# Patient Record
Sex: MALE | Race: BLACK OR AFRICAN AMERICAN | NOT HISPANIC OR LATINO | Employment: UNEMPLOYED | URBAN - METROPOLITAN AREA
[De-identification: names, ages, dates, MRNs, and addresses within clinical notes are randomized per-mention and may not be internally consistent; named-entity substitution may affect disease eponyms.]

---

## 2017-06-02 ENCOUNTER — HISTORICAL (OUTPATIENT)
Dept: ADMINISTRATIVE | Facility: HOSPITAL | Age: 37
End: 2017-06-02

## 2017-06-02 LAB
ABS NEUT (OLG): 2.49 X10(3)/MCL (ref 2.1–9.2)
ABS NEUT (OLG): 2.62 X10(3)/MCL (ref 2.1–9.2)
ALBUMIN SERPL-MCNC: 4.5 GM/DL (ref 3.4–5)
ALBUMIN/GLOB SERPL: 1 RATIO (ref 1–2)
ALP SERPL-CCNC: 49 UNIT/L (ref 20–120)
ALT SERPL-CCNC: 26 UNIT/L
AST SERPL-CCNC: 22 UNIT/L
BASOPHILS # BLD AUTO: 0.04 X10(3)/MCL
BASOPHILS # BLD AUTO: 0.04 X10(3)/MCL
BASOPHILS NFR BLD AUTO: 1 % (ref 0–1)
BASOPHILS NFR BLD AUTO: 1 % (ref 0–1)
BILIRUB SERPL-MCNC: 0.8 MG/DL
BILIRUBIN DIRECT+TOT PNL SERPL-MCNC: 0.2 MG/DL
BILIRUBIN DIRECT+TOT PNL SERPL-MCNC: 0.6 MG/DL
BUN SERPL-MCNC: 17 MG/DL (ref 7–25)
CALCIUM SERPL-MCNC: 9.3 MG/DL (ref 8.4–10.3)
CD3+CD4+ CELLS # SPEC: 1180 UNIT/L (ref 589–1505)
CD3+CD4+ CELLS NFR BLD: 43.7 % (ref 31–59)
CHLORIDE SERPL-SCNC: 103 MMOL/L (ref 96–110)
CO2 SERPL-SCNC: 31 MMOL/L (ref 24–32)
CREAT SERPL-MCNC: 1.01 MG/DL (ref 0.7–1.4)
EOSINOPHIL # BLD AUTO: 0.14 X10(3)/MCL
EOSINOPHIL # BLD AUTO: 0.23 10*3/UL
EOSINOPHIL NFR BLD AUTO: 2 % (ref 0–5)
EOSINOPHIL NFR BLD AUTO: 4 % (ref 0–5)
ERYTHROCYTE [DISTWIDTH] IN BLOOD BY AUTOMATED COUNT: 13.6 % (ref 11.5–14.5)
ERYTHROCYTE [DISTWIDTH] IN BLOOD BY AUTOMATED COUNT: 13.7 % (ref 11.5–14.5)
GLOBULIN SER-MCNC: 3.3 GM/ML (ref 2.3–3.5)
GLUCOSE SERPL-MCNC: 90 MG/DL (ref 65–99)
HCT VFR BLD AUTO: 47.3 % (ref 40–51)
HCT VFR BLD AUTO: 47.4 % (ref 40–51)
HGB BLD-MCNC: 16 GM/DL (ref 13.5–17.5)
HGB BLD-MCNC: 16.1 GM/DL (ref 13.5–17.5)
IMM GRANULOCYTES # BLD AUTO: 0.01 10*3/UL
IMM GRANULOCYTES # BLD AUTO: 0.01 10*3/UL
IMM GRANULOCYTES NFR BLD AUTO: 0 %
IMM GRANULOCYTES NFR BLD AUTO: 0 %
LYMPHOCYTES # BLD AUTO: 2.59 X10(3)/MCL
LYMPHOCYTES # BLD AUTO: 2.71 X10(3)/MCL
LYMPHOCYTES # BLD AUTO: 2700 UNIT/L (ref 1260–5520)
LYMPHOCYTES NFR BLD AUTO: 44 % (ref 15–40)
LYMPHOCYTES NFR BLD AUTO: 45 % (ref 15–40)
LYMPHOCYTES NFR LN MANUAL: 45 % (ref 28–48)
LYMPHOMA - T-CELL MARKERS SPEC-IMP: NORMAL
MCH RBC QN AUTO: 29.5 PG (ref 26–34)
MCH RBC QN AUTO: 29.5 PG (ref 26–34)
MCHC RBC AUTO-ENTMCNC: 33.8 GM/DL (ref 31–37)
MCHC RBC AUTO-ENTMCNC: 34 GM/DL (ref 31–37)
MCV RBC AUTO: 86.6 FL (ref 80–100)
MCV RBC AUTO: 87.3 FL (ref 80–100)
MONOCYTES # BLD AUTO: 0.54 X10(3)/MCL
MONOCYTES # BLD AUTO: 0.55 X10(3)/MCL
MONOCYTES NFR BLD AUTO: 9 % (ref 4–12)
MONOCYTES NFR BLD AUTO: 9 % (ref 4–12)
NEUTROPHILS # BLD AUTO: 2.49 X10(3)/MCL
NEUTROPHILS # BLD AUTO: 2.62 X10(3)/MCL
NEUTROPHILS NFR BLD AUTO: 41 X10(3)/MCL
NEUTROPHILS NFR BLD AUTO: 44 X10(3)/MCL
PLATELET # BLD AUTO: 179 X10(3)/MCL (ref 130–400)
PLATELET # BLD AUTO: 179 X10(3)/MCL (ref 130–400)
PMV BLD AUTO: 11.8 FL (ref 7.4–10.4)
PMV BLD AUTO: 12.2 FL (ref 7.4–10.4)
POTASSIUM SERPL-SCNC: 4.4 MMOL/L (ref 3.6–5.2)
PROT SERPL-MCNC: 7.8 GM/DL (ref 6–8)
RBC # BLD AUTO: 5.43 X10(6)/MCL (ref 4.5–5.9)
RBC # BLD AUTO: 5.46 X10(6)/MCL (ref 4.5–5.9)
SODIUM SERPL-SCNC: 138 MMOL/L (ref 135–146)
WBC # BLD AUTO: 6000 /MM3 (ref 4500–11500)
WBC # SPEC AUTO: 5.9 X10(3)/MCL (ref 4.5–11)
WBC # SPEC AUTO: 6 X10(3)/MCL (ref 4.5–11)

## 2017-09-22 ENCOUNTER — HISTORICAL (OUTPATIENT)
Dept: ADMINISTRATIVE | Facility: HOSPITAL | Age: 37
End: 2017-09-22

## 2017-09-22 LAB
ABS NEUT (OLG): 1.63 X10(3)/MCL (ref 2.1–9.2)
ABS NEUT (OLG): 1.76 X10(3)/MCL (ref 2.1–9.2)
ALBUMIN SERPL-MCNC: 4.2 GM/DL (ref 3.4–5)
ALBUMIN/GLOB SERPL: 1 RATIO (ref 1–2)
ALP SERPL-CCNC: 57 UNIT/L (ref 45–117)
ALT SERPL-CCNC: 30 UNIT/L (ref 12–78)
AST SERPL-CCNC: 19 UNIT/L (ref 15–37)
BASOPHILS # BLD AUTO: 0.05 X10(3)/MCL
BASOPHILS # BLD AUTO: 0.05 X10(3)/MCL
BASOPHILS NFR BLD AUTO: 1 % (ref 0–1)
BASOPHILS NFR BLD AUTO: 1 % (ref 0–1)
BILIRUB SERPL-MCNC: 0.5 MG/DL (ref 0.2–1)
BILIRUBIN DIRECT+TOT PNL SERPL-MCNC: 0.2 MG/DL
BILIRUBIN DIRECT+TOT PNL SERPL-MCNC: 0.3 MG/DL
BUN SERPL-MCNC: 13 MG/DL (ref 7–18)
CALCIUM SERPL-MCNC: 9.1 MG/DL (ref 8.5–10.1)
CD3+CD4+ CELLS # SPEC: 1123 UNIT/L (ref 589–1505)
CD3+CD4+ CELLS NFR BLD: 45.1 % (ref 31–59)
CHLORIDE SERPL-SCNC: 107 MMOL/L (ref 98–107)
CO2 SERPL-SCNC: 29 MMOL/L (ref 21–32)
CREAT SERPL-MCNC: 1.1 MG/DL (ref 0.6–1.3)
EOSINOPHIL # BLD AUTO: 0.13 10*3/UL
EOSINOPHIL # BLD AUTO: 0.13 X10(3)/MCL
EOSINOPHIL NFR BLD AUTO: 3 % (ref 0–5)
EOSINOPHIL NFR BLD AUTO: 3 % (ref 0–5)
ERYTHROCYTE [DISTWIDTH] IN BLOOD BY AUTOMATED COUNT: 13.2 % (ref 11.5–14.5)
ERYTHROCYTE [DISTWIDTH] IN BLOOD BY AUTOMATED COUNT: 13.2 % (ref 11.5–14.5)
GLOBULIN SER-MCNC: 3.8 GM/ML (ref 2.3–3.5)
GLUCOSE SERPL-MCNC: 91 MG/DL (ref 74–106)
HCT VFR BLD AUTO: 46.1 % (ref 40–51)
HCT VFR BLD AUTO: 48.3 % (ref 40–51)
HGB BLD-MCNC: 16.3 GM/DL (ref 13.5–17.5)
HGB BLD-MCNC: 16.7 GM/DL (ref 13.5–17.5)
IMM GRANULOCYTES # BLD AUTO: 0.01 10*3/UL
IMM GRANULOCYTES NFR BLD AUTO: 0 %
LYMPHOCYTES # BLD AUTO: 2.5 X10(3)/MCL
LYMPHOCYTES # BLD AUTO: 2.67 X10(3)/MCL
LYMPHOCYTES # BLD AUTO: 2491 UNIT/L (ref 1260–5520)
LYMPHOCYTES NFR BLD AUTO: 53 % (ref 15–40)
LYMPHOCYTES NFR BLD AUTO: 53 % (ref 15–40)
LYMPHOCYTES NFR LN MANUAL: 53 % (ref 28–48)
LYMPHOMA - T-CELL MARKERS SPEC-IMP: ABNORMAL
MCH RBC QN AUTO: 30.1 PG (ref 26–34)
MCH RBC QN AUTO: 30.6 PG (ref 26–34)
MCHC RBC AUTO-ENTMCNC: 34.6 GM/DL (ref 31–37)
MCHC RBC AUTO-ENTMCNC: 35.4 GM/DL (ref 31–37)
MCV RBC AUTO: 86.5 FL (ref 80–100)
MCV RBC AUTO: 87 FL (ref 80–100)
MONOCYTES # BLD AUTO: 0.36 X10(3)/MCL
MONOCYTES # BLD AUTO: 0.43 X10(3)/MCL
MONOCYTES NFR BLD AUTO: 8 % (ref 4–12)
MONOCYTES NFR BLD AUTO: 8 % (ref 4–12)
NEUTROPHILS # BLD AUTO: 1.63 X10(3)/MCL
NEUTROPHILS # BLD AUTO: 1.76 X10(3)/MCL
NEUTROPHILS NFR BLD AUTO: 35 X10(3)/MCL
NEUTROPHILS NFR BLD AUTO: 35 X10(3)/MCL
PLATELET # BLD AUTO: 175 X10(3)/MCL (ref 130–400)
PLATELET # BLD AUTO: 177 X10(3)/MCL (ref 130–400)
PMV BLD AUTO: 12.2 FL (ref 7.4–10.4)
PMV BLD AUTO: 12.2 FL (ref 7.4–10.4)
POTASSIUM SERPL-SCNC: 4.2 MMOL/L (ref 3.5–5.1)
PROT SERPL-MCNC: 8 GM/DL (ref 6.4–8.2)
RBC # BLD AUTO: 5.33 X10(6)/MCL (ref 4.5–5.9)
RBC # BLD AUTO: 5.55 X10(6)/MCL (ref 4.5–5.9)
SODIUM SERPL-SCNC: 139 MMOL/L (ref 136–145)
WBC # BLD AUTO: 4700 /MM3 (ref 4500–11500)
WBC # SPEC AUTO: 4.7 X10(3)/MCL (ref 4.5–11)
WBC # SPEC AUTO: 5 X10(3)/MCL (ref 4.5–11)

## 2017-12-29 ENCOUNTER — HISTORICAL (OUTPATIENT)
Dept: ADMINISTRATIVE | Facility: HOSPITAL | Age: 37
End: 2017-12-29

## 2017-12-29 LAB
ABS NEUT (OLG): 1.76 X10(3)/MCL (ref 2.1–9.2)
ABS NEUT (OLG): 1.86 X10(3)/MCL (ref 2.1–9.2)
ALBUMIN SERPL-MCNC: 3.9 GM/DL (ref 3.4–5)
ALBUMIN/GLOB SERPL: 1 RATIO (ref 1–2)
ALP SERPL-CCNC: 71 UNIT/L (ref 45–117)
ALT SERPL-CCNC: 34 UNIT/L (ref 12–78)
APPEARANCE, UA: CLEAR
AST SERPL-CCNC: 39 UNIT/L (ref 15–37)
BACTERIA #/AREA URNS AUTO: ABNORMAL /[HPF]
BASOPHILS # BLD AUTO: 0.04 X10(3)/MCL
BASOPHILS # BLD AUTO: 0.05 X10(3)/MCL
BASOPHILS NFR BLD AUTO: 1 % (ref 0–1)
BASOPHILS NFR BLD AUTO: 1 % (ref 0–1)
BILIRUB SERPL-MCNC: 0.4 MG/DL (ref 0.2–1)
BILIRUB UR QL STRIP: NEGATIVE
BILIRUBIN DIRECT+TOT PNL SERPL-MCNC: 0.1 MG/DL
BILIRUBIN DIRECT+TOT PNL SERPL-MCNC: 0.3 MG/DL
BUN SERPL-MCNC: 15 MG/DL (ref 7–18)
CALCIUM SERPL-MCNC: 8.7 MG/DL (ref 8.5–10.1)
CD3+CD4+ CELLS # SPEC: 967 UNIT/L (ref 589–1505)
CD3+CD4+ CELLS NFR BLD: 48.9 % (ref 31–59)
CHLORIDE SERPL-SCNC: 109 MMOL/L (ref 98–107)
CHOLEST SERPL-MCNC: 151 MG/DL
CHOLEST/HDLC SERPL: 4.7 {RATIO} (ref 0–5)
CO2 SERPL-SCNC: 29 MMOL/L (ref 21–32)
COLOR UR: YELLOW
CREAT SERPL-MCNC: 1.2 MG/DL (ref 0.6–1.3)
DEPRECATED CALCIDIOL+CALCIFEROL SERPL-MC: 47.01 NG/ML (ref 30–80)
EOSINOPHIL # BLD AUTO: 0.16 X10(3)/MCL
EOSINOPHIL # BLD AUTO: 0.22 10*3/UL
EOSINOPHIL NFR BLD AUTO: 4 % (ref 0–5)
EOSINOPHIL NFR BLD AUTO: 5 % (ref 0–5)
ERYTHROCYTE [DISTWIDTH] IN BLOOD BY AUTOMATED COUNT: 13.2 % (ref 11.5–14.5)
ERYTHROCYTE [DISTWIDTH] IN BLOOD BY AUTOMATED COUNT: 13.4 % (ref 11.5–14.5)
EST. AVERAGE GLUCOSE BLD GHB EST-MCNC: 123 MG/DL
GLOBULIN SER-MCNC: 3.7 GM/ML (ref 2.3–3.5)
GLUCOSE (UA): NORMAL
GLUCOSE SERPL-MCNC: 94 MG/DL (ref 74–106)
HBA1C MFR BLD: 5.9 % (ref 4.2–6.3)
HCT VFR BLD AUTO: 43.8 % (ref 40–51)
HCT VFR BLD AUTO: 44 % (ref 40–51)
HCV AB SERPL QL IA: NONREACTIVE
HDLC SERPL-MCNC: 32 MG/DL
HGB BLD-MCNC: 15.2 GM/DL (ref 13.5–17.5)
HGB BLD-MCNC: 15.3 GM/DL (ref 13.5–17.5)
HGB UR QL STRIP: NEGATIVE
HYALINE CASTS #/AREA URNS LPF: ABNORMAL /[LPF]
KETONES UR QL STRIP: ABNORMAL
LDLC SERPL CALC-MCNC: 84 MG/DL (ref 0–130)
LEUKOCYTE ESTERASE UR QL STRIP: 75 LEU/UL
LYMPHOCYTES # BLD AUTO: 1978 UNIT/L (ref 1260–5520)
LYMPHOCYTES # BLD AUTO: 2 X10(3)/MCL
LYMPHOCYTES # BLD AUTO: 2.07 X10(3)/MCL
LYMPHOCYTES NFR BLD AUTO: 46 % (ref 15–40)
LYMPHOCYTES NFR BLD AUTO: 46 % (ref 15–40)
LYMPHOCYTES NFR LN MANUAL: 46 % (ref 28–48)
LYMPHOMA - T-CELL MARKERS SPEC-IMP: ABNORMAL
MCH RBC QN AUTO: 30.3 PG (ref 26–34)
MCH RBC QN AUTO: 30.7 PG (ref 26–34)
MCHC RBC AUTO-ENTMCNC: 34.5 GM/DL (ref 31–37)
MCHC RBC AUTO-ENTMCNC: 34.9 GM/DL (ref 31–37)
MCV RBC AUTO: 87.8 FL (ref 80–100)
MCV RBC AUTO: 87.8 FL (ref 80–100)
MONOCYTES # BLD AUTO: 0.32 X10(3)/MCL
MONOCYTES # BLD AUTO: 0.34 X10(3)/MCL
MONOCYTES NFR BLD AUTO: 7 % (ref 4–12)
MONOCYTES NFR BLD AUTO: 8 % (ref 4–12)
NEG CONT SPOT COUNT: 0
NEUTROPHILS # BLD AUTO: 1.76 X10(3)/MCL
NEUTROPHILS # BLD AUTO: 1.86 X10(3)/MCL
NEUTROPHILS NFR BLD AUTO: 41 X10(3)/MCL
NEUTROPHILS NFR BLD AUTO: 41 X10(3)/MCL
NITRITE UR QL STRIP: NEGATIVE
PANEL A SPOT COUNT: 0
PANEL B SPOT COUNT: 0
PH UR STRIP: 6.5 [PH] (ref 4.5–8)
PLATELET # BLD AUTO: 167 X10(3)/MCL (ref 130–400)
PLATELET # BLD AUTO: 172 X10(3)/MCL (ref 130–400)
PMV BLD AUTO: 12.6 FL (ref 7.4–10.4)
PMV BLD AUTO: 12.6 FL (ref 7.4–10.4)
POS CONT SPOT COUNT: >20
POTASSIUM SERPL-SCNC: 4 MMOL/L (ref 3.5–5.1)
PROT SERPL-MCNC: 7.6 GM/DL (ref 6.4–8.2)
PROT UR QL STRIP: 20 MG/DL
RBC # BLD AUTO: 4.99 X10(6)/MCL (ref 4.5–5.9)
RBC # BLD AUTO: 5.01 X10(6)/MCL (ref 4.5–5.9)
RBC #/AREA URNS AUTO: ABNORMAL /[HPF]
SODIUM SERPL-SCNC: 144 MMOL/L (ref 136–145)
SP GR UR STRIP: 1.03 (ref 1–1.03)
SQUAMOUS #/AREA URNS LPF: ABNORMAL /[LPF]
T PALLIDUM AB SER QL: NONREACTIVE
T-SPOT.TB: NEGATIVE
TRIGL SERPL-MCNC: 174 MG/DL
TSH SERPL-ACNC: 0.44 MIU/L (ref 0.36–3.74)
UROBILINOGEN UR STRIP-ACNC: 6 MG/DL
VLDLC SERPL CALC-MCNC: 35 MG/DL
WBC # BLD AUTO: 4300 /MM3 (ref 4500–11500)
WBC # SPEC AUTO: 4.3 X10(3)/MCL (ref 4.5–11)
WBC # SPEC AUTO: 4.5 X10(3)/MCL (ref 4.5–11)
WBC #/AREA URNS AUTO: ABNORMAL /HPF

## 2018-07-31 ENCOUNTER — HISTORICAL (OUTPATIENT)
Dept: ADMINISTRATIVE | Facility: HOSPITAL | Age: 38
End: 2018-07-31

## 2018-07-31 LAB
ABS NEUT (OLG): 2.47 X10(3)/MCL (ref 2.1–9.2)
ALBUMIN SERPL-MCNC: 4.2 GM/DL (ref 3.4–5)
ALBUMIN/GLOB SERPL: 1 RATIO (ref 1–2)
ALP SERPL-CCNC: 58 UNIT/L (ref 45–117)
ALT SERPL-CCNC: 28 UNIT/L (ref 12–78)
AST SERPL-CCNC: 20 UNIT/L (ref 15–37)
BASOPHILS # BLD AUTO: 0.04 X10(3)/MCL
BASOPHILS NFR BLD AUTO: 1 %
BILIRUB SERPL-MCNC: 0.3 MG/DL (ref 0.2–1)
BILIRUBIN DIRECT+TOT PNL SERPL-MCNC: 0.1 MG/DL
BILIRUBIN DIRECT+TOT PNL SERPL-MCNC: 0.2 MG/DL
BUN SERPL-MCNC: 19 MG/DL (ref 7–18)
CALCIUM SERPL-MCNC: 8.8 MG/DL (ref 8.5–10.1)
CD3+CD4+ CELLS # SPEC: 998 UNIT/L (ref 589–1505)
CD3+CD4+ CELLS NFR BLD: 45.7 % (ref 31–59)
CHLORIDE SERPL-SCNC: 107 MMOL/L (ref 98–107)
CO2 SERPL-SCNC: 29 MMOL/L (ref 21–32)
CREAT SERPL-MCNC: 1.2 MG/DL (ref 0.6–1.3)
EOSINOPHIL # BLD AUTO: 0.12 10*3/UL
EOSINOPHIL NFR BLD AUTO: 2 %
ERYTHROCYTE [DISTWIDTH] IN BLOOD BY AUTOMATED COUNT: 14.2 % (ref 11.5–14.5)
GLOBULIN SER-MCNC: 3.8 GM/ML (ref 2.3–3.5)
GLUCOSE SERPL-MCNC: 91 MG/DL (ref 74–106)
HCT VFR BLD AUTO: 45.1 % (ref 40–51)
HGB BLD-MCNC: 15.3 GM/DL (ref 13.5–17.5)
IMM GRANULOCYTES # BLD AUTO: 0.02 10*3/UL
IMM GRANULOCYTES NFR BLD AUTO: 0 %
LYMPHOCYTES # BLD AUTO: 2.2 X10(3)/MCL
LYMPHOCYTES # BLD AUTO: 2184 UNIT/L (ref 1260–5520)
LYMPHOCYTES NFR BLD AUTO: 42 % (ref 13–40)
LYMPHOCYTES NFR LN MANUAL: 42 % (ref 28–48)
LYMPHOMA - T-CELL MARKERS SPEC-IMP: NORMAL
MCH RBC QN AUTO: 30.3 PG (ref 26–34)
MCHC RBC AUTO-ENTMCNC: 33.9 GM/DL (ref 31–37)
MCV RBC AUTO: 89.3 FL (ref 80–100)
MONOCYTES # BLD AUTO: 0.36 X10(3)/MCL
MONOCYTES NFR BLD AUTO: 7 % (ref 4–12)
NEUTROPHILS # BLD AUTO: 2.47 X10(3)/MCL
NEUTROPHILS NFR BLD AUTO: 47 X10(3)/MCL
PLATELET # BLD AUTO: 160 X10(3)/MCL (ref 130–400)
PMV BLD AUTO: 12.4 FL (ref 7.4–10.4)
POTASSIUM SERPL-SCNC: 4 MMOL/L (ref 3.5–5.1)
PROT SERPL-MCNC: 8 GM/DL (ref 6.4–8.2)
RBC # BLD AUTO: 5.05 X10(6)/MCL (ref 4.5–5.9)
SODIUM SERPL-SCNC: 138 MMOL/L (ref 136–145)
WBC # BLD AUTO: 5200 /MM3 (ref 4500–11500)
WBC # SPEC AUTO: 5.2 X10(3)/MCL (ref 4.5–11)

## 2018-12-03 ENCOUNTER — HISTORICAL (OUTPATIENT)
Dept: ADMINISTRATIVE | Facility: HOSPITAL | Age: 38
End: 2018-12-03

## 2018-12-03 LAB
ABS NEUT (OLG): 1.97 X10(3)/MCL (ref 2.1–9.2)
ABS NEUT (OLG): 2.04 X10(3)/MCL (ref 2.1–9.2)
ALBUMIN SERPL-MCNC: 4.2 GM/DL (ref 3.4–5)
ALBUMIN/GLOB SERPL: 1 RATIO (ref 1–2)
ALP SERPL-CCNC: 59 UNIT/L (ref 45–117)
ALT SERPL-CCNC: 26 UNIT/L (ref 12–78)
AST SERPL-CCNC: 13 UNIT/L (ref 15–37)
BASOPHILS # BLD AUTO: 0.05 X10(3)/MCL
BASOPHILS # BLD AUTO: 0.05 X10(3)/MCL
BASOPHILS NFR BLD AUTO: 1 %
BASOPHILS NFR BLD AUTO: 1 %
BILIRUB SERPL-MCNC: 0.4 MG/DL (ref 0.2–1)
BILIRUBIN DIRECT+TOT PNL SERPL-MCNC: 0.1 MG/DL
BILIRUBIN DIRECT+TOT PNL SERPL-MCNC: 0.3 MG/DL
BUN SERPL-MCNC: 19 MG/DL (ref 7–18)
CALCIUM SERPL-MCNC: 8.7 MG/DL (ref 8.5–10.1)
CD3+CD4+ CELLS # SPEC: 1259 UNIT/L (ref 589–1505)
CD3+CD4+ CELLS NFR BLD: 45.7 % (ref 31–59)
CHLORIDE SERPL-SCNC: 104 MMOL/L (ref 98–107)
CHOLEST SERPL-MCNC: 157 MG/DL
CHOLEST/HDLC SERPL: 5.4 {RATIO} (ref 0–5)
CO2 SERPL-SCNC: 31 MMOL/L (ref 21–32)
CREAT SERPL-MCNC: 1.2 MG/DL (ref 0.6–1.3)
DEPRECATED CALCIDIOL+CALCIFEROL SERPL-MC: 18.83 NG/ML (ref 30–80)
EOSINOPHIL # BLD AUTO: 0.11 X10(3)/MCL
EOSINOPHIL # BLD AUTO: 0.11 X10(3)/MCL
EOSINOPHIL NFR BLD AUTO: 2 %
EOSINOPHIL NFR BLD AUTO: 2 %
ERYTHROCYTE [DISTWIDTH] IN BLOOD BY AUTOMATED COUNT: 13.6 % (ref 11.5–14.5)
ERYTHROCYTE [DISTWIDTH] IN BLOOD BY AUTOMATED COUNT: 13.7 % (ref 11.5–14.5)
EST. AVERAGE GLUCOSE BLD GHB EST-MCNC: 114 MG/DL
GLOBULIN SER-MCNC: 3.9 GM/ML (ref 2.3–3.5)
GLUCOSE SERPL-MCNC: 92 MG/DL (ref 74–106)
HBA1C MFR BLD: 5.6 % (ref 4.2–6.3)
HCT VFR BLD AUTO: 45.9 % (ref 40–51)
HCT VFR BLD AUTO: 46 % (ref 40–51)
HCV AB SERPL QL IA: NONREACTIVE
HDLC SERPL-MCNC: 29 MG/DL
HGB BLD-MCNC: 15.5 GM/DL (ref 13.5–17.5)
HGB BLD-MCNC: 15.6 GM/DL (ref 13.5–17.5)
IMM GRANULOCYTES # BLD AUTO: 0.01 10*3/UL
IMM GRANULOCYTES # BLD AUTO: 0.01 10*3/UL
IMM GRANULOCYTES NFR BLD AUTO: 0 %
IMM GRANULOCYTES NFR BLD AUTO: 0 %
LDLC SERPL CALC-MCNC: 103 MG/DL (ref 0–130)
LYMPHOCYTES # BLD AUTO: 2.76 X10(3)/MCL
LYMPHOCYTES # BLD AUTO: 2.8 X10(3)/MCL
LYMPHOCYTES # BLD AUTO: 2756 UNIT/L (ref 1260–5520)
LYMPHOCYTES NFR BLD AUTO: 52 % (ref 13–40)
LYMPHOCYTES NFR BLD AUTO: 52 % (ref 13–40)
LYMPHOCYTES NFR LN MANUAL: 52 % (ref 28–48)
LYMPHOMA - T-CELL MARKERS SPEC-IMP: ABNORMAL
MCH RBC QN AUTO: 30.2 PG (ref 26–34)
MCH RBC QN AUTO: 30.3 PG (ref 26–34)
MCHC RBC AUTO-ENTMCNC: 33.8 GM/DL (ref 31–37)
MCHC RBC AUTO-ENTMCNC: 33.9 GM/DL (ref 31–37)
MCV RBC AUTO: 89.3 FL (ref 80–100)
MCV RBC AUTO: 89.5 FL (ref 80–100)
MONOCYTES # BLD AUTO: 0.37 X10(3)/MCL
MONOCYTES # BLD AUTO: 0.4 X10(3)/MCL
MONOCYTES NFR BLD AUTO: 7 % (ref 4–12)
MONOCYTES NFR BLD AUTO: 8 % (ref 4–12)
NEG CONT SPOT COUNT: NORMAL
NEUTROPHILS # BLD AUTO: 1.97 X10(3)/MCL
NEUTROPHILS # BLD AUTO: 2.04 X10(3)/MCL
NEUTROPHILS NFR BLD AUTO: 37 X10(3)/MCL
NEUTROPHILS NFR BLD AUTO: 38 X10(3)/MCL
PANEL A SPOT COUNT: 0
PANEL B SPOT COUNT: 0
PLATELET # BLD AUTO: 167 X10(3)/MCL (ref 130–400)
PLATELET # BLD AUTO: 172 X10(3)/MCL (ref 130–400)
PMV BLD AUTO: 12.1 FL (ref 7.4–10.4)
PMV BLD AUTO: 12.1 FL (ref 7.4–10.4)
POS CONT SPOT COUNT: NORMAL
POTASSIUM SERPL-SCNC: 3.7 MMOL/L (ref 3.5–5.1)
PROT SERPL-MCNC: 8.1 GM/DL (ref 6.4–8.2)
RBC # BLD AUTO: 5.13 X10(6)/MCL (ref 4.5–5.9)
RBC # BLD AUTO: 5.15 X10(6)/MCL (ref 4.5–5.9)
SODIUM SERPL-SCNC: 138 MMOL/L (ref 136–145)
T PALLIDUM AB SER QL: NONREACTIVE
T-SPOT.TB: NORMAL
TRIGL SERPL-MCNC: 123 MG/DL
TSH SERPL-ACNC: 0.4 MIU/L (ref 0.36–3.74)
VLDLC SERPL CALC-MCNC: 25 MG/DL
WBC # BLD AUTO: 5300 /MM3 (ref 4500–11500)
WBC # SPEC AUTO: 5.3 X10(3)/MCL (ref 4.5–11)
WBC # SPEC AUTO: 5.4 X10(3)/MCL (ref 4.5–11)

## 2019-03-22 ENCOUNTER — HISTORICAL (OUTPATIENT)
Dept: ADMINISTRATIVE | Facility: HOSPITAL | Age: 39
End: 2019-03-22

## 2019-03-22 LAB
ABS NEUT (OLG): 2.59 X10(3)/MCL (ref 2.1–9.2)
ALBUMIN SERPL-MCNC: 4.2 GM/DL (ref 3.4–5)
ALBUMIN/GLOB SERPL: 1.1 RATIO (ref 1.1–2)
ALP SERPL-CCNC: 62 UNIT/L (ref 45–117)
ALT SERPL-CCNC: 26 UNIT/L (ref 12–78)
AST SERPL-CCNC: 16 UNIT/L (ref 15–37)
BASOPHILS # BLD AUTO: 0.04 X10(3)/MCL
BASOPHILS NFR BLD AUTO: 1 %
BILIRUB SERPL-MCNC: 0.4 MG/DL (ref 0.2–1)
BILIRUBIN DIRECT+TOT PNL SERPL-MCNC: <0.1 MG/DL
BILIRUBIN DIRECT+TOT PNL SERPL-MCNC: ABNORMAL MG/DL
BUN SERPL-MCNC: 14 MG/DL (ref 7–18)
CALCIUM SERPL-MCNC: 8.7 MG/DL (ref 8.5–10.1)
CHLORIDE SERPL-SCNC: 107 MMOL/L (ref 98–107)
CO2 SERPL-SCNC: 29 MMOL/L (ref 21–32)
CREAT SERPL-MCNC: 1 MG/DL (ref 0.6–1.3)
EOSINOPHIL # BLD AUTO: 0.08 X10(3)/MCL
EOSINOPHIL NFR BLD AUTO: 1 %
ERYTHROCYTE [DISTWIDTH] IN BLOOD BY AUTOMATED COUNT: 13.7 % (ref 11.5–14.5)
GLOBULIN SER-MCNC: 3.7 GM/ML (ref 2.3–3.5)
GLUCOSE SERPL-MCNC: 75 MG/DL (ref 74–106)
HCT VFR BLD AUTO: 44.5 % (ref 40–51)
HGB BLD-MCNC: 15.1 GM/DL (ref 13.5–17.5)
IMM GRANULOCYTES # BLD AUTO: 0.01 10*3/UL
IMM GRANULOCYTES NFR BLD AUTO: 0 %
LYMPHOCYTES # BLD AUTO: 2.82 X10(3)/MCL
LYMPHOCYTES NFR BLD AUTO: 48 % (ref 13–40)
MCH RBC QN AUTO: 30.3 PG (ref 26–34)
MCHC RBC AUTO-ENTMCNC: 33.9 GM/DL (ref 31–37)
MCV RBC AUTO: 89.4 FL (ref 80–100)
MONOCYTES # BLD AUTO: 0.39 X10(3)/MCL
MONOCYTES NFR BLD AUTO: 7 % (ref 4–12)
NEUTROPHILS # BLD AUTO: 2.59 X10(3)/MCL
NEUTROPHILS NFR BLD AUTO: 44 X10(3)/MCL
PLATELET # BLD AUTO: 189 X10(3)/MCL (ref 130–400)
PMV BLD AUTO: 11.5 FL (ref 7.4–10.4)
POTASSIUM SERPL-SCNC: 4 MMOL/L (ref 3.5–5.1)
PROT SERPL-MCNC: 7.9 GM/DL (ref 6.4–8.2)
RBC # BLD AUTO: 4.98 X10(6)/MCL (ref 4.5–5.9)
SODIUM SERPL-SCNC: 140 MMOL/L (ref 136–145)
WBC # SPEC AUTO: 5.9 X10(3)/MCL (ref 4.5–11)

## 2019-11-04 ENCOUNTER — HISTORICAL (OUTPATIENT)
Dept: ADMINISTRATIVE | Facility: HOSPITAL | Age: 39
End: 2019-11-04

## 2019-11-04 LAB
ABS NEUT (OLG): 2.27 X10(3)/MCL (ref 2.1–9.2)
ALBUMIN SERPL-MCNC: 4.2 GM/DL (ref 3.4–5)
ALBUMIN/GLOB SERPL: 1 RATIO (ref 1.1–2)
ALP SERPL-CCNC: 67 UNIT/L (ref 45–117)
ALT SERPL-CCNC: 33 UNIT/L (ref 12–78)
AST SERPL-CCNC: 17 UNIT/L (ref 15–37)
BASOPHILS # BLD AUTO: 0 X10(3)/MCL (ref 0–0.2)
BASOPHILS NFR BLD AUTO: 1 %
BILIRUB SERPL-MCNC: 0.6 MG/DL (ref 0.2–1)
BILIRUBIN DIRECT+TOT PNL SERPL-MCNC: 0.2 MG/DL (ref 0–0.2)
BILIRUBIN DIRECT+TOT PNL SERPL-MCNC: 0.4 MG/DL
BUN SERPL-MCNC: 17 MG/DL (ref 7–18)
CALCIUM SERPL-MCNC: 9.3 MG/DL (ref 8.5–10.1)
CD3+CD4+ CELLS # SPEC: 1160 UNIT/L (ref 589–1505)
CD3+CD4+ CELLS NFR BLD: 45.7 % (ref 31–59)
CHLORIDE SERPL-SCNC: 105 MMOL/L (ref 98–107)
CO2 SERPL-SCNC: 30 MMOL/L (ref 21–32)
CREAT SERPL-MCNC: 1.4 MG/DL (ref 0.6–1.3)
EOSINOPHIL # BLD AUTO: 0.2 X10(3)/MCL (ref 0–0.9)
EOSINOPHIL NFR BLD AUTO: 3 %
ERYTHROCYTE [DISTWIDTH] IN BLOOD BY AUTOMATED COUNT: 13.8 % (ref 11.5–14.5)
GLOBULIN SER-MCNC: 4 GM/ML (ref 2.3–3.5)
GLUCOSE SERPL-MCNC: 61 MG/DL (ref 74–106)
HCT VFR BLD AUTO: 50.4 % (ref 40–51)
HGB BLD-MCNC: 16.5 GM/DL (ref 13.5–17.5)
IMM GRANULOCYTES # BLD AUTO: 0.01 10*3/UL
IMM GRANULOCYTES NFR BLD AUTO: 0 %
LYMPHOCYTES # BLD AUTO: 2.6 X10(3)/MCL (ref 0.6–4.6)
LYMPHOCYTES # BLD AUTO: 2538 UNIT/L (ref 1260–5520)
LYMPHOCYTES NFR BLD AUTO: 47 %
LYMPHOCYTES NFR LN MANUAL: 47 % (ref 28–48)
LYMPHOMA - T-CELL MARKERS SPEC-IMP: NORMAL
MCH RBC QN AUTO: 29.9 PG (ref 26–34)
MCHC RBC AUTO-ENTMCNC: 32.7 GM/DL (ref 31–37)
MCV RBC AUTO: 91.5 FL (ref 80–100)
MONOCYTES # BLD AUTO: 0.4 X10(3)/MCL (ref 0.1–1.3)
MONOCYTES NFR BLD AUTO: 8 %
NEUTROPHILS # BLD AUTO: 2.27 X10(3)/MCL (ref 2.1–9.2)
NEUTROPHILS NFR BLD AUTO: 42 %
PLATELET # BLD AUTO: 191 X10(3)/MCL (ref 130–400)
PMV BLD AUTO: 12.5 FL (ref 7.4–10.4)
POTASSIUM SERPL-SCNC: 4.2 MMOL/L (ref 3.5–5.1)
PROT SERPL-MCNC: 8.2 GM/DL (ref 6.4–8.2)
RBC # BLD AUTO: 5.51 X10(6)/MCL (ref 4.5–5.9)
SODIUM SERPL-SCNC: 140 MMOL/L (ref 136–145)
T PALLIDUM AB SER QL: NONREACTIVE
WBC # BLD AUTO: 5400 /MM3 (ref 4500–11500)
WBC # SPEC AUTO: 5.4 X10(3)/MCL (ref 4.5–11)

## 2019-12-24 ENCOUNTER — HISTORICAL (OUTPATIENT)
Dept: ADMINISTRATIVE | Facility: HOSPITAL | Age: 39
End: 2019-12-24

## 2019-12-24 LAB
BUN SERPL-MCNC: 16 MG/DL (ref 7–18)
CALCIUM SERPL-MCNC: 9 MG/DL (ref 8.5–10.1)
CHLORIDE SERPL-SCNC: 108 MMOL/L (ref 98–107)
CO2 SERPL-SCNC: 29 MMOL/L (ref 21–32)
CREAT SERPL-MCNC: 1.2 MG/DL (ref 0.6–1.3)
CREAT/UREA NIT SERPL: 13
GLUCOSE SERPL-MCNC: 83 MG/DL (ref 74–106)
POTASSIUM SERPL-SCNC: 4.2 MMOL/L (ref 3.5–5.1)
SODIUM SERPL-SCNC: 140 MMOL/L (ref 136–145)

## 2020-10-16 ENCOUNTER — HISTORICAL (OUTPATIENT)
Dept: ADMINISTRATIVE | Facility: HOSPITAL | Age: 40
End: 2020-10-16

## 2020-10-16 LAB
ABS NEUT (OLG): 1.97 X10(3)/MCL (ref 2.1–9.2)
ALBUMIN SERPL-MCNC: 4 GM/DL (ref 3.4–5)
ALBUMIN/GLOB SERPL: 1.1 RATIO (ref 1.1–2)
ALP SERPL-CCNC: 60 UNIT/L (ref 45–117)
ALT SERPL-CCNC: 36 UNIT/L (ref 12–78)
APPEARANCE, UA: CLEAR
AST SERPL-CCNC: 21 UNIT/L (ref 15–37)
BACTERIA #/AREA URNS AUTO: ABNORMAL /HPF
BASOPHILS # BLD AUTO: 0 X10(3)/MCL (ref 0–0.2)
BASOPHILS NFR BLD AUTO: 1 %
BILIRUB SERPL-MCNC: 0.5 MG/DL (ref 0.2–1)
BILIRUB UR QL STRIP: NEGATIVE
BILIRUBIN DIRECT+TOT PNL SERPL-MCNC: 0.1 MG/DL (ref 0–0.2)
BILIRUBIN DIRECT+TOT PNL SERPL-MCNC: 0.4 MG/DL
BUN SERPL-MCNC: 14 MG/DL (ref 7–18)
CALCIUM SERPL-MCNC: 9.1 MG/DL (ref 8.5–10.1)
CD3+CD4+ CELLS # SPEC: 982 UNIT/L (ref 589–1505)
CD3+CD4+ CELLS NFR BLD: 47.5 % (ref 31–59)
CHLORIDE SERPL-SCNC: 105 MMOL/L (ref 98–107)
CHOLEST SERPL-MCNC: 175 MG/DL
CHOLEST/HDLC SERPL: 5.6 {RATIO} (ref 0–5)
CO2 SERPL-SCNC: 28 MMOL/L (ref 21–32)
COLOR UR: YELLOW
CREAT SERPL-MCNC: 1.3 MG/DL (ref 0.6–1.3)
DEPRECATED CALCIDIOL+CALCIFEROL SERPL-MC: 12.9 NG/ML (ref 30–80)
EOSINOPHIL # BLD AUTO: 0.1 X10(3)/MCL (ref 0–0.9)
EOSINOPHIL NFR BLD AUTO: 2 %
ERYTHROCYTE [DISTWIDTH] IN BLOOD BY AUTOMATED COUNT: 13.5 % (ref 11.5–14.5)
EST. AVERAGE GLUCOSE BLD GHB EST-MCNC: 126 MG/DL
GLOBULIN SER-MCNC: 3.8 GM/ML (ref 2.3–3.5)
GLUCOSE (UA): NEGATIVE
GLUCOSE SERPL-MCNC: 102 MG/DL (ref 74–106)
HBA1C MFR BLD: 6 % (ref 4.2–6.3)
HCT VFR BLD AUTO: 45.9 % (ref 40–51)
HCV AB SERPL QL IA: NONREACTIVE
HDLC SERPL-MCNC: 31 MG/DL (ref 40–59)
HGB BLD-MCNC: 15.2 GM/DL (ref 13.5–17.5)
HGB UR QL STRIP: NEGATIVE
HYALINE CASTS #/AREA URNS LPF: ABNORMAL /LPF
IMM GRANULOCYTES # BLD AUTO: 0.01 10*3/UL
IMM GRANULOCYTES NFR BLD AUTO: 0 %
KETONES UR QL STRIP: NEGATIVE
LDLC SERPL CALC-MCNC: 117 MG/DL
LEUKOCYTE ESTERASE UR QL STRIP: NEGATIVE
LYMPHOCYTES # BLD AUTO: 2.1 X10(3)/MCL (ref 0.6–4.6)
LYMPHOCYTES # BLD AUTO: 2068 UNIT/L (ref 1260–5520)
LYMPHOCYTES NFR BLD AUTO: 47 %
LYMPHOCYTES NFR LN MANUAL: 47 % (ref 28–48)
LYMPHOMA - T-CELL MARKERS SPEC-IMP: ABNORMAL
MCH RBC QN AUTO: 29.6 PG (ref 26–34)
MCHC RBC AUTO-ENTMCNC: 33.1 GM/DL (ref 31–37)
MCV RBC AUTO: 89.3 FL (ref 80–100)
MONOCYTES # BLD AUTO: 0.2 X10(3)/MCL (ref 0.1–1.3)
MONOCYTES NFR BLD AUTO: 6 %
NEG CONT SPOT COUNT: NORMAL
NEUTROPHILS # BLD AUTO: 1.97 X10(3)/MCL (ref 2.1–9.2)
NEUTROPHILS NFR BLD AUTO: 45 %
NITRITE UR QL STRIP: NEGATIVE
PANEL A SPOT COUNT: 2
PANEL B SPOT COUNT: 0
PH UR STRIP: 6 [PH] (ref 4.5–8)
PLATELET # BLD AUTO: 142 X10(3)/MCL (ref 130–400)
PMV BLD AUTO: 13.1 FL (ref 7.4–10.4)
POS CONT SPOT COUNT: NORMAL
POTASSIUM SERPL-SCNC: 3.9 MMOL/L (ref 3.5–5.1)
PROT SERPL-MCNC: 7.8 GM/DL (ref 6.4–8.2)
PROT UR QL STRIP: 20 MG/DL
RBC # BLD AUTO: 5.14 X10(6)/MCL (ref 4.5–5.9)
RBC #/AREA URNS AUTO: ABNORMAL /HPF
SODIUM SERPL-SCNC: 139 MMOL/L (ref 136–145)
SP GR UR STRIP: 1.03 (ref 1–1.03)
SQUAMOUS #/AREA URNS LPF: ABNORMAL /LPF
T PALLIDUM AB SER QL: NONREACTIVE
T-SPOT.TB: NORMAL
TRIGL SERPL-MCNC: 137 MG/DL
TSH SERPL-ACNC: 0.65 MIU/L (ref 0.36–3.74)
UROBILINOGEN UR STRIP-ACNC: 2 MG/DL
VLDLC SERPL CALC-MCNC: 27 MG/DL
WBC # BLD AUTO: 4400 /MM3 (ref 4500–11500)
WBC # SPEC AUTO: 4.4 X10(3)/MCL (ref 4.5–11)
WBC #/AREA URNS AUTO: ABNORMAL /HPF

## 2021-03-01 ENCOUNTER — HISTORICAL (OUTPATIENT)
Dept: ADMINISTRATIVE | Facility: HOSPITAL | Age: 41
End: 2021-03-01

## 2021-03-01 LAB
ABS NEUT (OLG): 2.17 X10(3)/MCL (ref 2.1–9.2)
ALBUMIN SERPL-MCNC: 4.2 GM/DL (ref 3.5–5)
ALBUMIN/GLOB SERPL: 1.4 RATIO (ref 1.1–2)
ALP SERPL-CCNC: 63 UNIT/L (ref 40–150)
ALT SERPL-CCNC: 25 UNIT/L (ref 0–55)
AST SERPL-CCNC: 19 UNIT/L (ref 5–34)
BASOPHILS # BLD AUTO: 0 X10(3)/MCL (ref 0–0.2)
BASOPHILS NFR BLD AUTO: 1 %
BILIRUB SERPL-MCNC: 0.5 MG/DL
BILIRUBIN DIRECT+TOT PNL SERPL-MCNC: 0.2 MG/DL (ref 0–0.5)
BILIRUBIN DIRECT+TOT PNL SERPL-MCNC: 0.3 MG/DL (ref 0–0.8)
BUN SERPL-MCNC: 14.7 MG/DL (ref 8.9–20.6)
CALCIUM SERPL-MCNC: 9 MG/DL (ref 8.4–10.2)
CD3+CD4+ CELLS # SPEC: 1131 UNIT/L (ref 589–1505)
CD3+CD4+ CELLS NFR BLD: 47.3 % (ref 31–59)
CHLORIDE SERPL-SCNC: 104 MMOL/L (ref 98–107)
CO2 SERPL-SCNC: 27 MMOL/L (ref 22–29)
CREAT SERPL-MCNC: 1.04 MG/DL (ref 0.73–1.18)
EOSINOPHIL # BLD AUTO: 0.1 X10(3)/MCL (ref 0–0.9)
EOSINOPHIL NFR BLD AUTO: 2 %
ERYTHROCYTE [DISTWIDTH] IN BLOOD BY AUTOMATED COUNT: 14.3 % (ref 11.5–14.5)
GLOBULIN SER-MCNC: 2.9 GM/DL (ref 2.4–3.5)
GLUCOSE SERPL-MCNC: 91 MG/DL (ref 74–100)
HCT VFR BLD AUTO: 45.6 % (ref 40–51)
HGB BLD-MCNC: 15.1 GM/DL (ref 13.5–17.5)
IMM GRANULOCYTES # BLD AUTO: 0.01 10*3/UL
IMM GRANULOCYTES NFR BLD AUTO: 0 %
LYMPHOCYTES # BLD AUTO: 2.4 X10(3)/MCL (ref 0.6–4.6)
LYMPHOCYTES # BLD AUTO: 2392 UNIT/L (ref 1260–5520)
LYMPHOCYTES NFR BLD AUTO: 46 %
LYMPHOCYTES NFR LN MANUAL: 46 % (ref 28–48)
LYMPHOMA - T-CELL MARKERS SPEC-IMP: NORMAL
MCH RBC QN AUTO: 29.6 PG (ref 26–34)
MCHC RBC AUTO-ENTMCNC: 33.1 GM/DL (ref 31–37)
MCV RBC AUTO: 89.4 FL (ref 80–100)
MONOCYTES # BLD AUTO: 0.4 X10(3)/MCL (ref 0.1–1.3)
MONOCYTES NFR BLD AUTO: 8 %
NEUTROPHILS # BLD AUTO: 2.17 X10(3)/MCL (ref 2.1–9.2)
NEUTROPHILS NFR BLD AUTO: 42 %
PLATELET # BLD AUTO: 174 X10(3)/MCL (ref 130–400)
PMV BLD AUTO: 12.3 FL (ref 7.4–10.4)
POTASSIUM SERPL-SCNC: 4.1 MMOL/L (ref 3.5–5.1)
PROT SERPL-MCNC: 7.1 GM/DL (ref 6.4–8.3)
RBC # BLD AUTO: 5.1 X10(6)/MCL (ref 4.5–5.9)
SODIUM SERPL-SCNC: 138 MMOL/L (ref 136–145)
WBC # BLD AUTO: 5200 /MM3 (ref 4500–11500)
WBC # SPEC AUTO: 5.2 X10(3)/MCL (ref 4.5–11)

## 2021-07-01 ENCOUNTER — HISTORICAL (OUTPATIENT)
Dept: ADMINISTRATIVE | Facility: HOSPITAL | Age: 41
End: 2021-07-01

## 2021-07-01 LAB
ABS NEUT (OLG): 2.86 X10(3)/MCL (ref 2.1–9.2)
ALBUMIN SERPL-MCNC: 4.2 GM/DL (ref 3.5–5)
ALBUMIN/GLOB SERPL: 1.1 RATIO (ref 1.1–2)
ALP SERPL-CCNC: 58 UNIT/L (ref 40–150)
ALT SERPL-CCNC: 21 UNIT/L (ref 0–55)
AST SERPL-CCNC: 22 UNIT/L (ref 5–34)
BASOPHILS # BLD AUTO: 0.1 X10(3)/MCL (ref 0–0.2)
BASOPHILS NFR BLD AUTO: 1 %
BILIRUB SERPL-MCNC: 0.5 MG/DL
BILIRUBIN DIRECT+TOT PNL SERPL-MCNC: 0.2 MG/DL (ref 0–0.5)
BILIRUBIN DIRECT+TOT PNL SERPL-MCNC: 0.3 MG/DL (ref 0–0.8)
BUN SERPL-MCNC: 18.2 MG/DL (ref 8.9–20.6)
CALCIUM SERPL-MCNC: 10.1 MG/DL (ref 8.4–10.2)
CD3+CD4+ CELLS # SPEC: 1209 UNIT/L (ref 589–1505)
CD3+CD4+ CELLS NFR BLD: 43.6 % (ref 31–59)
CHLORIDE SERPL-SCNC: 105 MMOL/L (ref 98–107)
CO2 SERPL-SCNC: 30 MMOL/L (ref 22–29)
CREAT SERPL-MCNC: 1.25 MG/DL (ref 0.73–1.18)
EOSINOPHIL # BLD AUTO: 0.2 X10(3)/MCL (ref 0–0.9)
EOSINOPHIL NFR BLD AUTO: 3 %
ERYTHROCYTE [DISTWIDTH] IN BLOOD BY AUTOMATED COUNT: 14.4 % (ref 11.5–14.5)
GLOBULIN SER-MCNC: 3.7 GM/DL (ref 2.4–3.5)
GLUCOSE SERPL-MCNC: 91 MG/DL (ref 74–100)
HCT VFR BLD AUTO: 47.7 % (ref 40–51)
HGB BLD-MCNC: 16 GM/DL (ref 13.5–17.5)
IMM GRANULOCYTES # BLD AUTO: 0.01 10*3/UL
IMM GRANULOCYTES NFR BLD AUTO: 0 %
LYMPHOCYTES # BLD AUTO: 2.8 X10(3)/MCL (ref 0.6–4.6)
LYMPHOCYTES # BLD AUTO: 2772 UNIT/L (ref 1260–5520)
LYMPHOCYTES NFR BLD AUTO: 42 %
LYMPHOCYTES NFR LN MANUAL: 42 % (ref 28–48)
LYMPHOMA - T-CELL MARKERS SPEC-IMP: NORMAL
MCH RBC QN AUTO: 30.3 PG (ref 26–34)
MCHC RBC AUTO-ENTMCNC: 33.5 GM/DL (ref 31–37)
MCV RBC AUTO: 90.3 FL (ref 80–100)
MONOCYTES # BLD AUTO: 0.7 X10(3)/MCL (ref 0.1–1.3)
MONOCYTES NFR BLD AUTO: 10 %
NEUTROPHILS # BLD AUTO: 2.86 X10(3)/MCL (ref 2.1–9.2)
NEUTROPHILS NFR BLD AUTO: 44 %
NRBC BLD AUTO-RTO: 0 % (ref 0–0.2)
PLATELET # BLD AUTO: 195 X10(3)/MCL (ref 130–400)
PMV BLD AUTO: 12.2 FL (ref 7.4–10.4)
POTASSIUM SERPL-SCNC: 4.1 MMOL/L (ref 3.5–5.1)
PROT SERPL-MCNC: 7.9 GM/DL (ref 6.4–8.3)
RBC # BLD AUTO: 5.28 X10(6)/MCL (ref 4.5–5.9)
SODIUM SERPL-SCNC: 140 MMOL/L (ref 136–145)
T PALLIDUM AB SER QL: NONREACTIVE
WBC # BLD AUTO: 6600 /MM3 (ref 4500–11500)
WBC # SPEC AUTO: 6.6 X10(3)/MCL (ref 4.5–11)

## 2021-11-03 ENCOUNTER — HISTORICAL (OUTPATIENT)
Dept: ADMINISTRATIVE | Facility: HOSPITAL | Age: 41
End: 2021-11-03

## 2021-11-03 LAB
ABS NEUT (OLG): 3.34 X10(3)/MCL (ref 2.1–9.2)
ALBUMIN SERPL-MCNC: 4.4 GM/DL (ref 3.5–5)
ALBUMIN/GLOB SERPL: 1.2 RATIO (ref 1.1–2)
ALP SERPL-CCNC: 62 UNIT/L (ref 40–150)
ALT SERPL-CCNC: 26 UNIT/L (ref 0–55)
APPEARANCE, UA: CLEAR
AST SERPL-CCNC: 21 UNIT/L (ref 5–34)
BACTERIA #/AREA URNS AUTO: ABNORMAL /HPF
BASOPHILS # BLD AUTO: 0 X10(3)/MCL (ref 0–0.2)
BASOPHILS NFR BLD AUTO: 1 %
BILIRUB SERPL-MCNC: 0.4 MG/DL
BILIRUB UR QL STRIP: NEGATIVE
BILIRUBIN DIRECT+TOT PNL SERPL-MCNC: 0.2 MG/DL (ref 0–0.5)
BILIRUBIN DIRECT+TOT PNL SERPL-MCNC: 0.2 MG/DL (ref 0–0.8)
BUN SERPL-MCNC: 17.2 MG/DL (ref 8.9–20.6)
CALCIUM SERPL-MCNC: 9.9 MG/DL (ref 8.7–10.5)
CD3+CD4+ CELLS # SPEC: 657 UNIT/L (ref 589–1505)
CD3+CD4+ CELLS NFR BLD: 28.5 % (ref 31–59)
CHLORIDE SERPL-SCNC: 106 MMOL/L (ref 98–107)
CHOLEST SERPL-MCNC: 172 MG/DL
CHOLEST/HDLC SERPL: 5 {RATIO} (ref 0–5)
CO2 SERPL-SCNC: 26 MMOL/L (ref 22–29)
COLOR UR: YELLOW
CREAT SERPL-MCNC: 1.23 MG/DL (ref 0.73–1.18)
DEPRECATED CALCIDIOL+CALCIFEROL SERPL-MC: 23.4 NG/ML (ref 30–80)
EOSINOPHIL # BLD AUTO: 0.2 X10(3)/MCL (ref 0–0.9)
EOSINOPHIL NFR BLD AUTO: 4 %
ERYTHROCYTE [DISTWIDTH] IN BLOOD BY AUTOMATED COUNT: 13.4 % (ref 11.5–14.5)
EST CREAT CLEARANCE SER (OHS): 91.07 ML/MIN
EST. AVERAGE GLUCOSE BLD GHB EST-MCNC: 111.2 MG/DL
GLOBULIN SER-MCNC: 3.7 GM/DL (ref 2.4–3.5)
GLUCOSE (UA): NEGATIVE
GLUCOSE SERPL-MCNC: 85 MG/DL (ref 74–100)
HBA1C MFR BLD: 5.5 %
HCT VFR BLD AUTO: 45.7 % (ref 40–51)
HCV AB SERPL QL IA: NONREACTIVE
HDLC SERPL-MCNC: 33 MG/DL (ref 35–60)
HGB BLD-MCNC: 15.6 GM/DL (ref 13.5–17.5)
HGB UR QL STRIP: 0.03 MG/DL
HYALINE CASTS #/AREA URNS LPF: ABNORMAL /LPF
IMM GRANULOCYTES # BLD AUTO: 0.01 10*3/UL
IMM GRANULOCYTES NFR BLD AUTO: 0 %
KETONES UR QL STRIP: NEGATIVE
LDLC SERPL CALC-MCNC: 115 MG/DL (ref 50–140)
LEUKOCYTE ESTERASE UR QL STRIP: 250 LEU/UL
LYMPHOCYTES # BLD AUTO: 2.3 X10(3)/MCL (ref 0.6–4.6)
LYMPHOCYTES # BLD AUTO: 2304 UNIT/L (ref 1260–5520)
LYMPHOCYTES NFR BLD AUTO: 36 %
LYMPHOCYTES NFR LN MANUAL: 36 % (ref 28–48)
MCH RBC QN AUTO: 30.1 PG (ref 26–34)
MCHC RBC AUTO-ENTMCNC: 34.1 GM/DL (ref 31–37)
MCV RBC AUTO: 88.1 FL (ref 80–100)
MONOCYTES # BLD AUTO: 0.5 X10(3)/MCL (ref 0.1–1.3)
MONOCYTES NFR BLD AUTO: 8 %
NEG CONT SPOT COUNT: NORMAL
NEUTROPHILS # BLD AUTO: 3.34 X10(3)/MCL (ref 2.1–9.2)
NEUTROPHILS NFR BLD AUTO: 52 %
NITRITE UR QL STRIP: NEGATIVE
NRBC BLD AUTO-RTO: 0 % (ref 0–0.2)
PANEL A SPOT COUNT: 3
PANEL B SPOT COUNT: 0
PH UR STRIP: 6 [PH] (ref 4.5–8)
PLATELET # BLD AUTO: 180 X10(3)/MCL (ref 130–400)
PMV BLD AUTO: 12.4 FL (ref 7.4–10.4)
POS CONT SPOT COUNT: NORMAL
POTASSIUM SERPL-SCNC: 4 MMOL/L (ref 3.5–5.1)
PROT SERPL-MCNC: 8.1 GM/DL (ref 6.4–8.3)
PROT UR QL STRIP: 10 MG/DL
RBC # BLD AUTO: 5.19 X10(6)/MCL (ref 4.5–5.9)
RBC #/AREA URNS AUTO: ABNORMAL /HPF
SODIUM SERPL-SCNC: 140 MMOL/L (ref 136–145)
SP GR UR STRIP: 1.03 (ref 1–1.03)
SQUAMOUS #/AREA URNS LPF: ABNORMAL /LPF
T PALLIDUM AB SER QL: NONREACTIVE
T-SPOT.TB: NORMAL
TRIGL SERPL-MCNC: 120 MG/DL (ref 34–140)
TSH SERPL-ACNC: 1.01 UIU/ML (ref 0.35–4.94)
UROBILINOGEN UR STRIP-ACNC: 3 MG/DL
VLDLC SERPL CALC-MCNC: 24 MG/DL
WBC # BLD AUTO: 6400 /MM3 (ref 4500–11500)
WBC # SPEC AUTO: 6.4 X10(3)/MCL (ref 4.5–11)
WBC #/AREA URNS AUTO: ABNORMAL /HPF

## 2021-11-05 LAB — FINAL CULTURE: NO GROWTH

## 2022-05-02 NOTE — HISTORICAL [facility] CERNER
This is a historical note converted from [employer]. Formatting and pictures may have been removed.  Please reference [employer] for original formatting and attached multimedia. Chief Complaint  b 20 f/u pt c/o tremble to Rt. Shoulder, arm and hand x 2 mths  History of Present Illness  Rafi is a 39 yo BM presenting today for HIV f/u visit.? Reports 100% adherent to Descovy & Tivicay, tolerates well with viral suppression.? States that trazodone is not working for sleep, ambien did not work either.? Increased stressors since last visit, found out that girlfriend was cheating.? In the process of moving back to [place], [place]. Has resumed paxil 20mg/day, sometimes taking 2 tabs.? C/o tremor to right hand/wrist x 2 months, constant (at rest & with activity).? States occurred over a year ago intermittently, but is not constant.? No headaches or visual disturbance.? No weight loss or appetite changes.? Amenable to recommended vaccinations today.? Recent HSV outbreak, states valtrex was not effective & would like rf of acyclovir to have on hand.  ?  7/31/18  Rafi is a 39 yo BM presenting today for HIV f/u visit.??Reports 98%% adherent to Descovy & Tivicay, tolerates well with viral suppression.? Missed 1-2 doses per month.? Trazodone no longer effective at 50mg dose, would like to try higher dose, no hangover effects noted.? Mood stable.? Diet high in sugar, will reduce sugars in diet, specifically soda.? Taking vit d2 1-2 times per month, levels stable.? Left foot wound completely resolved with antibiotics rx last visit.? Will see private opth for routine eye exam.? Amenable to recommended vaccination today.  ?   12/29/17  Rafi is a 38 yo MSW BM presenting today for HIV f/u visit.? Reports 100% adherent to Descovy & Tivicay, tolerating very well and is virally suppressed and immunocompetent. Received trazodone, now sleeping much better & mood is improved as well.? Taking vit d2 apprx 2 times per month, often forgets to take.?  Desires flu vax today.? Denies any new sexual partners or hx of syphilis.? Not fasting, ate full meal this am.? Did not take Lamisil as rxd, left foot wound not healed.? Still has meds at home.  Review of Systems  ?  ?  Constitutional: negative except as stated in HPI  Eye: negative except as stated in HPI  ENMT: negative except as stated in HPI  Respiratory: negative except as stated in HPI  Cardiovascular: negative except as stated in HPI  Gastrointestinal: negative except as stated in HPI  Genitourinary: negative except as stated in HPI  Hema/Lymph: negative except as stated in HPI  Endocrine: negative except as stated in HPI  Immunologic: negative except as stated in HPI  Musculoskeletal: negative except as stated in HPI  Integumentary: negative except as stated in HPI  Neurologic: negative except as stated in HPI  ?   All Other ROS_ ?negative except as stated in HPI  Physical Exam  Vitals & Measurements  T:?36.7? ?C (Oral)? HR:?70(Peripheral)? RR:?18? BP:?104/64? SpO2:?98%?  HT:?188?cm? HT:?188?cm? WT:?96.9?kg? WT:?96.9?kg? BMI:?27.42?  ?  ?  General: AAO X 4, afebrile, VSS  Eye: no icterus, EOMs intact  HENT: oropharynx clear  Neck: supple  Respiratory: BBS CTA, non-labored, symmetrical  Cardiovascular: S1S2, RRR  Gastrointestinal BS + 4 quadrants, NTND, soft, no organomegaly  Genitourinary: non-tender  Lymphatics: no lymphadenopathy  Musculoskeletal: MAEW, steady gait, grade 2 strength bilat upper extrem  Integumentary: WDI  Neurologic: CN II-XII intact, constant unilateral tremor noted below the elbow right upper extrem diminished by distraction of tapping with left upper extrem  Assessment/Plan  1.?HIV (human immunodeficiency virus infection)(  Confirmed  )?B20  ? adherence/sexual health counseling done  cont Descovy & Tivicay daily  labs today  rtc?6 wks?w?renetta??  Ordered:  dolutegravir, 50 mg = 1 tab(s), Oral, Daily, # 30 tab(s), 4 Refill(s), Pharmacy: [facility]  emtricitabine-tenofovir, 1  tab(s), Oral, Daily, # 30 tab(s), 4 Refill(s), Pharmacy: [facility]  CBC w/ Auto Diff, Routine collect, 12/03/18 11:22:00 CST, Blood, Order for future visit, Stop date 12/03/18 11:22:00 CST, Lab Collect, HIV (human immunodeficiency virus infection)(  Confirmed  ), 12/03/18 11:22:00 CST  CD4 Lymphoctye Count, Routine collect, 12/03/18 11:22:00 CST, Blood, Order for future visit, Stop date 12/03/18 11:22:00 CST, Lab Collect, HIV (human immunodeficiency virus infection)(  Confirmed  ), 12/03/18 11:22:00 CST  Chlamydia trach and N. gonorrhea PCR, Routine collect, Urine, Order for future visit, 12/03/18 11:22:00 CST, Stop date 12/03/18 11:22:00 CST, Nurse collect, HIV (human immunodeficiency virus infection)(  Confirmed  )  Clinic Follow up, *Est. 12/03/18 3:00:00 CST, Order for future visit, HIV (human immunodeficiency virus infection)(  Confirmed  ), [facility]  Comprehensive Metabolic Panel, Routine collect, 12/03/18 11:22:00 CST, Blood, Order for future visit, Stop date 12/03/18 11:22:00 CST, Lab Collect, HIV (human immunodeficiency virus infection)(  Confirmed  ), 12/03/18 11:22:00 CST  Hemoglobin A1C [facility], Routine collect, 12/03/18 11:22:00 CST, Blood, Order for future visit, Stop date 12/03/18 11:22:00 CST, Lab Collect, HIV (human immunodeficiency virus infection)(  Confirmed  ), 12/03/18 11:22:00 CST  Hepatitis C Antibody, Routine collect, 12/03/18 11:22:00 CST, Blood, Order for future visit, Stop date 12/03/18 11:22:00 CST, Lab Collect, HIV (human immunodeficiency virus infection)(  Confirmed  ), 12/03/18 11:22:00 CST  Lipid Panel, Routine collect, 12/03/18 11:22:00 CST, Blood, Order for future visit, Stop date 12/03/18 11:22:00 CST, Lab Collect, HIV (human immunodeficiency virus infection)(  Confirmed  ), 12/03/18 11:22:00 CST  RNA, PCR(NonGraph)rfx/GenoPRIme(R)-[facility] [id number], Routine collect, 12/03/18 11:22:00 CST, Blood, Order for future visit, Stop date 12/03/18 11:22:00 CST,  Lab Collect, HIV (human immunodeficiency virus infection)(  Confirmed  ), 12/03/18 11:22:00 CST  Syphilis Antibody (with Reflex RPR), Routine collect, 12/03/18 11:22:00 CST, Blood, Order for future visit, Stop date 12/03/18 11:22:00 CST, Lab Collect, HIV (human immunodeficiency virus infection)(  Confirmed  ), 12/03/18 11:22:00 CST  T Spot Test for M. tuberculosis, Routine collect, 12/03/18 11:22:00 CST, Blood, Order for future visit, Stop date 12/03/18 11:22:00 CST, Lab Collect, HIV (human immunodeficiency virus infection)(  Confirmed  ), 12/03/18 11:22:00 CST  Thyroid Stimulating Hormone, Routine collect, 12/03/18 11:22:00 CST, Blood, Order for future visit, Stop date 12/03/18 11:22:00 CST, Lab Collect, HIV (human immunodeficiency virus infection)(  Confirmed  ), 12/03/18 11:22:00 CST  Urinalysis with Microscopic if Indicated, Routine collect, Urine, Order for future visit, 12/03/18 11:22:00 CST, Stop date 12/03/18 11:22:00 CST, Nurse collect, HIV (human immunodeficiency virus infection)(  Confirmed  )  ?  2.?Vitamin D deficiency(  Confirmed  )?E55.9  check level today  vit d2 every other week as rxd  Ordered:  ergocalciferol, 50,000 IntUnit = 1 cap(s), Oral, QOWK, # 5 cap(s), 4 Refill(s), Pharmacy: [facility]  Vitamin D, 25-Hydroxy Level, Routine collect, 12/03/18 11:22:00 CST, Blood, Order for future visit, Stop date 12/03/18 11:22:00 CST, Lab Collect, Vitamin D deficiency(  Confirmed  ), 12/03/18 11:22:00 CST  ?  3.?Depression?F32.9  ? cont paxil 20mg/day, do not self adjust medications  hydroxyzine 25mg po qid prn anxiety  ?  4.?Insomnia(  Confirmed  )?G47.00  ? d/c trazodone  replace with amitriptyline 25mg?q?hs, do not mix or combine medications  ?  5.?Need for vaccination?Z23  ? flu vax, menveo #2 today  Ordered:  Influenza Virus Vaccine, Inactivated, 0.5 mL, form: Susp, IM, Once, first dose 12/03/18 11:21:00 CST, stop date 12/03/18 11:21:00 CST  meningococcal conjugate vaccine, 0.5  mL, form: Powder-Inj, IM, Once, first dose 12/03/18 11:21:00 CST, stop date 12/03/18 11:21:00 CST  ?  6.?Tremor?R25.1  ?symptoms consistent with functional tremor  EMG & MRI brain for further assessment  rtc 6 wks w renetta for results  Ordered:  External Diagnostic Order, *Est. 12/03/18 3:00:00 CST, EMG bilat upper extrem, Future Order, Tremor  MRI Brain W W/O Contrast, Routine, *Est. 12/03/18 3:00:00 CST, Other (please specify), unilateral tremor RUE, None, Ambulatory, Rad Type, Order for future visit, Tremor, Schedule this test, [facility], *Est. 12/03/18 3:00:00 CST  ?  Orders:  acyclovir, 400 mg = 1 tab(s), Oral, 5x/Day, PRN PRN other (see comment), as needed for outbreaks, # 35 tab(s), 2 Refill(s), Pharmacy: [facility]  amitriptyline, 25 mg = 1 tab(s), Oral, Once a day (at bedtime), # 30 tab(s), 3 Refill(s), Pharmacy: [facility]  hydrOXYzine, 25 mg = 1 tab(s), Oral, QID, PRN PRN for anxiety, # 40 tab(s), 2 Refill(s), Pharmacy: [facility]  PARoxetine, 20 mg = 1 tab(s), Oral, Daily, # 30 tab(s), 3 Refill(s), Pharmacy: [facility]   Problem List/Past Medical History  Ongoing  HIV (human immunodeficiency virus infection)(  Confirmed  )  Insomnia(  Confirmed  )  Vitamin D deficiency(  Confirmed  )  Historical  Depressive disorder(  Confirmed  )  HIV  Procedure/Surgical History  Hernia Repair Ventral (None) (07/29/2015)  No   Medications  acyclovir 400 mg oral tablet, 400 mg= 1 tab(s), Oral, 5x/Day, PRN, 2 refills  amitriptyline 25 mg oral tablet, 25 mg= 1 tab(s), Oral, Once a day (at bedtime), 3 refills  Descovy 200 mg-25 mg oral tablet, 1 tab(s), Oral, Daily, 4 refills  ergocalciferol 50,000 intUnit oral capsule, 48571 IntUnit= 1 cap(s), Oral, QOWK, 4 refills  hydrOXYzine hydrochloride 25 mg oral tablet, 25 mg= 1 tab(s), Oral, QID, PRN, 2 refills  influenza virus vaccine, inactivated preserve-free pediatric quadrivalent intramuscular suspension, 0.5 mL,  IM, Once  meningococcal conjugate vaccine intramuscular injection, 0.5 mL, IM, Once  paroxetine 20 mg oral tablet, 20 mg= 1 tab(s), Oral, Daily, 3 refills  Tivicay 50 mg oral tablet, 50 mg= 1 tab(s), Oral, Daily, 4 refills  Allergies  No Known Medication Allergies  Social History  Alcohol  Current, 07/13/2015  Employment/School  Employed, 09/22/2017  Exercise  Home/Environment  Lives with Alone., 09/22/2017  Nutrition/Health  Regular, 09/22/2017  Sexual  Sexually active: Yes., 09/22/2017  Substance Abuse  Never, 09/22/2017  Tobacco  Former smoker, No, 12/03/2018  Current some day smoker Use:. Previous treatment: None. Ready to change: No. Household tobacco concerns: No., 07/31/2018  Former smoker, 07/21/2015  Former smoker, 07/21/2015  Family History  Hypertension.: Mother.  Tobacco use.: Mother.  Immunizations  Vaccine Date Status   meningococcal conjugate vaccine 07/31/2018 Given   influenza virus vaccine, inactivated 12/29/2017 Given   influenza virus vaccine, inactivated 10/18/2016 Given   influenza virus vaccine, inactivated 03/17/2016 Given   tetanus/diphtheria/pertussis, acel(Tdap) 07/13/2015 Given   pneumococcal 13-valent conjugate vaccine 01/08/2015 Recorded   influenza virus vaccine, inactivated 01/08/2015 Recorded   pneumococcal 23-polyvalent vaccine 01/21/2014 Recorded   influenza virus vaccine, inactivated 01/21/2014 Recorded   hepatitis A-hepatitis B vaccine 12/01/2008 Recorded   Health Maintenance  Health Maintenance  ???Pending?(in the next year)  ??? ??OverDue  ??? ? ? ?Diabetes Screening due??and every?  ??? ??Due?  ??? ? ? ?ADL Screening due??12/03/18??and every 1??year(s)  ??? ? ? ?Alcohol Misuse Screening due??12/03/18??and every 1??year(s)  ??? ? ? ?Influenza Vaccine due??12/03/18??and every?  ??? ??Due In Future?  ??? ? ? ?Smoking Cessation not due until??07/31/19??and every 1??year(s)  ???Satisfied?(in the past 1 year)  ??? ??Satisfied?  ??? ? ? ?Blood Pressure Screening  on??12/03/18.??Satisfied by Gillian Nelson LPN  ??? ? ? ?Body Mass Index Check on??12/03/18.??Satisfied by Gillian Nelson LPN  ??? ? ? ?Depression Screening on??12/03/18.??Satisfied by Gillian Nelson LPN  ??? ? ? ?Diabetes Screening on??07/31/18.??Satisfied by Dre Reed Jr.  ??? ? ? ?Influenza Vaccine on??12/03/18.??Satisfied by Cindy Maddox.  ??? ? ? ?Lipid Screening on??12/29/17.??Satisfied by Marcella Kirk  ??? ? ? ?Obesity Screening on??12/03/18.??Satisfied by Gillian Nelson LPN  ??? ? ? ?Smoking Cessation on??07/31/18.??Satisfied by Mone Unger LPN  ?  ?  Lab Results  Test Name Test Result Date/Time Comments   Creatinine 1.20 mg/dL 07/31/2018 11:15 CDT    AST 20 unit/L 07/31/2018 11:15 CDT    ALT 28 unit/L 07/31/2018 11:15 CDT    Hgb A1c 5.9 % 12/29/2017 09:46 CST    Vit D 25 OH 47.01 ng/mL 12/29/2017 09:46 CST Vit D 25 Hydroxy Reference Range:<br/>0 - 17 years - &nbsp; &nbsp; Deficiency: &nbsp; &nbsp; &nbsp; &nbsp; less than 20 ng/ml<br/> &nbsp; &nbsp; &nbsp; &nbsp; Optimum level: &nbsp; &gt; or = 20 ng/ml<br/>18 yrs or older: &nbsp;Deficiency: &nbsp; &nbsp; &nbsp; &nbsp; less than 20 ng/ml<br/> &nbsp; &nbsp; &nbsp; &nbsp; &nbsp; &nbsp; &nbsp; &nbsp; &nbsp; &nbsp; &nbsp; &nbsp; &nbsp;Insufficiency: &nbsp; &nbsp; 20 - -29 ng/ml<br/> &nbsp; &nbsp; &nbsp; &nbsp; &nbsp; &nbsp; &nbsp; &nbsp; &nbsp; &nbsp; &nbsp; &nbsp; &nbsp;Optimum level: &nbsp;30 - 80 ng/ml<br/> &nbsp; &nbsp; &nbsp; &nbsp; &nbsp; &nbsp; &nbsp; &nbsp; &nbsp; &nbsp; &nbsp; &nbsp; &nbsp;Possible toxicity: &gt; or = 150 ng/ml   Chol 151 mg/dL 12/29/2017 09:46 CST    HDL 32 mg/dL (Low) 12/29/2017 09:46 CST    Trig 174 mg/dL (High) 12/29/2017 09:46 CST    LDL 84 mg/dL 12/29/2017 09:46 CST    Chol/HDL 4.7 12/29/2017 09:46 CST    VLDL 35 mg/dL (High) 12/29/2017 09:46 CST    TSH 0.443 mIU/L 12/29/2017 09:46 CST    WBC 5.2 x10(3)/mcL 07/31/2018 11:15 CDT    Hgb 15.3 gm/dL 07/31/2018 11:15 CDT    Hct 45.1 % 07/31/2018 11:15  CDT    Platelet 160 x10(3)/mcL 07/31/2018 11:15 CDT    CD4 Pct 45.7 % 07/31/2018 11:15 CDT    CD4 Absolute 998 unit/L 07/31/2018 11:15 CDT    Syphilis Ab Nonreactive 12/29/2017 09:46 CST    Hep C Ab Nonreactive 12/29/2017 09:46 CST    HIV1 RNA PCR-LC <20 cpy/mL 07/31/2018 11:15 CDT HIV-1 RNA not detected<br/> <br/>The reportable range for this assay is 20 to 10,000,000<br/>copies HIV-1 RNA/mL.   Neg Cont Spot count 0 12/29/2017 09:46 CST       [1]?Office Visit Note; Cindy Maddox 07/31/2018 10:46 CDT

## 2022-05-02 NOTE — HISTORICAL [facility] CERNER
This is a historical note converted from [employer]ia. Formatting and pictures may have been removed.  Please reference Adia for original formatting and attached multimedia. Chief Complaint  F/U B20 mgmt  History of Present Illness  Rafi is a 38 yo BM presenting today for HIV f/u visit.? States that he had a treatment interruption for about a month s/t insurance lapse, has resumed ART x approximately 2 weeks now.? No current HSV outbreak, but needs some acyclovir on hand prn outbreaks.? States that he forgets to take vit d2, has plenty at home.? Taking paxil daily.? C/o feeling tired often, working day job & DJ at night, not getting enough sleep.? Amenable to oral & anal swabs today for pap & STI screens.? Appreciates flu vax & Gardasil 9 #2 today.? Will go to IM clinic for fundus photo today.  ?  3/22/19  Rafi is a 39 yo BM presenting today for HIV f/u visit.? Missed approximately 2 wks of Descovy & Tivicay around [place] time s/t incarceration.? Otherwise, 100% adherent to ART.? Did not schedule EMG & MRI, tremors resolved.? Paxil effective, does not think he received the hydroxyzine.? Did not take the Elavil, not sure if he received that either.? Amenable to recommended vaccinations today, HPV & PPSV 23.? Requests to defer oral & anal swabs to next visit.? Needs rf on vit d, recently resumed.? Also request refill on acyclovir, effective prn HSV outbreak.  ?   12/3/18  Rafi is a 39 yo BM presenting today for HIV f/u visit.? Reports 100% adherent to Descovy & Tivicay, tolerates well with viral suppression.? States that trazodone is not working for sleep, ambien did not work either.? Increased stressors since last visit, found out that girlfriend was cheating.? In the process of moving back to [place], [place]. Has resumed paxil 20mg/day, sometimes taking 2 tabs.? C/o tremor to right hand/wrist x 2 months, constant (at rest & with activity).? States occurred over a year ago intermittently, but is not constant.? No  headaches or visual disturbance.? No weight loss or appetite changes.? Amenable to recommended vaccinations today.? Recent HSV outbreak, states valtrex was not effective & would like rf of acyclovir to have on hand.  ?  Review of Systems  ?  ?  Constitutional: negative except as stated in HPI  Eye: negative except as stated in HPI  ENMT: negative except as stated in HPI  Respiratory: negative except as stated in HPI  Cardiovascular: negative except as stated in HPI  Gastrointestinal: negative except as stated in HPI  Genitourinary: negative except as stated in HPI  Hema/Lymph: negative except as stated in HPI  Endocrine: negative except as stated in HPI  Immunologic: negative except as stated in HPI  Musculoskeletal: negative except as stated in HPI  Integumentary: negative except as stated in HPI  Neurologic: negative except as stated in HPI  ?   All Other ROS_ ?negative except as stated in HPI  Physical Exam  Vitals & Measurements  T:?36.8? ?C (Oral)? HR:?73(Peripheral)? RR:?16? BP:?108/74?  HT:?189?cm? WT:?97.4?kg? BMI:?27.27?  General: AAO X 4, afebrile  Eye: no icterus  HENT: oropharynx clear, oral ct/gc swab done  Neck: supple  Respiratory: BBS CTA, non-labored, symmetrical  Cardiovascular: S1S2, RRR  Gastrointestinal BS + 4 quadrants, NTND, soft, no organomegaly  Rectal: no ext lesions, good sphincter tone, no palp mass, no alfa blood, swab done for ct/gc & pap  Lymphatics: no lymphadenopathy  Musculoskeletal: MAEW, steady gait  Integumentary: WDI  Neurologic: CN II-XII intact  Assessment/Plan  1.?HIV (human immunodeficiency virus infection)(  Confirmed  )?B20  adherence/sexual health counseling done  cont Descovy & Tivicay daily  labs today  rtc?4 mos? w Cindy  Ordered:  dolutegravir, 50 mg = 1 tab(s), Oral, Daily, # 30 tab(s), 4 Refill(s), Pharmacy: [facility]  emtricitabine-tenofovir, 1 tab(s), Oral, Daily, # 30 tab(s), 4 Refill(s), Pharmacy: [facility]  .Cd4 Lymphocytes, Routine  collect, 11/04/19 13:45:00 CST, Blood, Stop date 11/04/19 13:45:00 CST, Lab Collect, HIV (human immunodeficiency virus infection)(  Confirmed  ), 11/04/19 13:45:00 CST  1160F- Medication reconciliation completed during visit, HIV (human immunodeficiency virus infection)(  Confirmed  )  HSV infection  Vitamin D deficiency  Cancer screening  Routine screening for STI (sexually transmitted infection)  Depression with anxiety, [facility], 11/04/19 13:37:00 CST  CBC w/ Auto Diff, Now collect, 11/04/19 13:45:00 CST, Blood, Stop date 11/04/19 13:45:00 CST, Lab Collect, HIV (human immunodeficiency virus infection)(  Confirmed  ), 11/04/19 13:45:00 CST  Clinic Follow up, *Est. 03/04/20 3:00:00 CST, Order for future visit, HIV (human immunodeficiency virus infection)(  Confirmed  ), [facility]  Comprehensive Metabolic Panel, Routine collect, 11/04/19 13:45:00 CST, Blood, Stop date 11/04/19 13:45:00 CST, Lab Collect, HIV (human immunodeficiency virus infection)(  Confirmed  ), 11/04/19 13:45:00 CST  Office/Outpatient Visit Level 4 Established 37125 PC, HIV (human immunodeficiency virus infection)(  Confirmed  )  HSV infection  Vitamin D deficiency  Cancer screening  Routine screening for STI (sexually transmitted infection)  Depression with anxiety, [facility], 11/04/19 13:37:00 CST  RNA, PCR(NonGraph)rfx/GenoPRIme(R)-[facility] [id number], Routine collect, 11/04/19 13:45:00 CST, Blood, Stop date 11/04/19 13:45:00 CST, Lab Collect, HIV (human immunodeficiency virus infection)(  Confirmed  ), 11/04/19 13:45:00 CST  ?  2.?HSV infection?B00.9  ?acyclovir 400mg po bid prn outbreak  sexual health counseling done  Ordered:  acyclovir, 400 mg = 1 tab(s), Oral, BID, PRN PRN other (see comment), as needed for outbreaks, # 20 tab(s), 2 Refill(s), Pharmacy: [facility]  1160F- Medication reconciliation completed during visit, HIV (human immunodeficiency virus infection)(  Confirmed  )  HSV infection   Vitamin D deficiency  Cancer screening  Routine screening for STI (sexually transmitted infection)  Depression with anxiety, [facility], 11/04/19 13:37:00 CST  Office/Outpatient Visit Level 4 Established 36190 PC, HIV (human immunodeficiency virus infection)(  Confirmed  )  HSV infection  Vitamin D deficiency  Cancer screening  Routine screening for STI (sexually transmitted infection)  Depression with anxiety, [facility], 11/04/19 13:37:00 CST  ?  3.?Vitamin D deficiency?E55.9  ?cont vit d2 as rxd  Ordered:  1160F- Medication reconciliation completed during visit, HIV (human immunodeficiency virus infection)(  Confirmed  )  HSV infection  Vitamin D deficiency  Cancer screening  Routine screening for STI (sexually transmitted infection)  Depression with anxiety, [facility], 11/04/19 13:37:00 CST  Office/Outpatient Visit Level 4 Established 19118 PC, HIV (human immunodeficiency virus infection)(  Confirmed  )  HSV infection  Vitamin D deficiency  Cancer screening  Routine screening for STI (sexually transmitted infection)  Depression with anxiety, [facility], 11/04/19 13:37:00 CST  ?  4.?Cancer screening?Z12.9  ?anal pap collected  Ordered:  1160F- Medication reconciliation completed during visit, HIV (human immunodeficiency virus infection)(  Confirmed  )  HSV infection  Vitamin D deficiency  Cancer screening  Routine screening for STI (sexually transmitted infection)  Depression with anxiety, [facility], 11/04/19 13:37:00 CST  Office/Outpatient Visit Level 4 Established 27095 PC, HIV (human immunodeficiency virus infection)(  Confirmed  )  HSV infection  Vitamin D deficiency  Cancer screening  Routine screening for STI (sexually transmitted infection)  Depression with anxiety, [facility], 11/04/19 13:37:00 CST  Pathology Non-Gyn Request [facility], 11/04/19 13:38:00 CST, Routine, Order for future visit, AP Specimen, anal swab, anal ca screen/hiv, Collected, Nurse Collect, Print  Label, RT - Routine, [email or web address], Hold Until Collected, Cancer screening, 11/04/19 13:38:00 CST  ?  5.?Routine screening for STI (sexually transmitted infection)?Z11.3  ?urine, oral & anal swabs collected for ct/gc, rpr today  Ordered:  1160F- Medication reconciliation completed during visit, HIV (human immunodeficiency virus infection)(  Confirmed  )  HSV infection  Vitamin D deficiency  Cancer screening  Routine screening for STI (sexually transmitted infection)  Depression with anxiety, [facility], 11/04/19 13:37:00 CST  Chlamydia trach & N.gonorr Rectal AMD Lab [facility], Routine collect, Rectal Swab, Order for future visit, Collected, 11/04/19 13:38:00 CST, Stop date 11/04/19 13:38:00 CST, Nurse collect, Routine screening for STI (sexually transmitted infection), 11/04/19 13:38:00 CST  Chlamydia trach & N.gonorrhoeae Pharyn AMD-LC, Routine collect, Throat, Order for future visit, Collected, 11/04/19 13:38:00 CST, Stop date 11/04/19 13:38:00 CST, Nurse collect, Routine screening for STI (sexually transmitted infection), 11/04/19 13:38:00 CST  Office/Outpatient Visit Level 4 Established 72443 PC, HIV (human immunodeficiency virus infection)(  Confirmed  )  HSV infection  Vitamin D deficiency  Cancer screening  Routine screening for STI (sexually transmitted infection)  Depression with anxiety, [facility], 11/04/19 13:37:00 CST  Syphilis Antibody (with Reflex RPR), Routine collect, 11/04/19 13:45:00 CST, Blood, Stop date 11/04/19 13:45:00 CST, Lab Collect, Routine screening for STI (sexually transmitted infection), 11/04/19 13:45:00 CST  ?  6.?Depression with anxiety?F41.8  ?cont paxil 20mg/day  Ordered:  1160F- Medication reconciliation completed during visit, HIV (human immunodeficiency virus infection)(  Confirmed  )  HSV infection  Vitamin D deficiency  Cancer screening  Routine screening for STI (sexually transmitted infection)  Depression with anxiety, [facility], 11/04/19 13:37:00  CST  Office/Outpatient Visit Level 4 Established 55482 PC, HIV (human immunodeficiency virus infection)(  Confirmed  )  HSV infection  Vitamin D deficiency  Cancer screening  Routine screening for STI (sexually transmitted infection)  Depression with anxiety, [facility], 11/04/19 13:37:00 CST  ?  7.?Need for vaccination?Z23  ?flu & gardasil 9 #2 vaccinations today  Ordered:  human papillomavirus vaccine, 0.5 mL, form: Susp, IM, Once-Unscheduled, first dose 11/04/19 13:39:00 CST  Influenza Virus Vaccine, Inactivated, 0.5 mL, form: Susp, IM, Once, first dose 11/04/19 13:39:00 CST, stop date 11/04/19 13:39:00 CST  ?  Orders:  PARoxetine, 20 mg = 1 tab(s), Oral, Daily, # 30 tab(s), 3 Refill(s), Pharmacy: [facility]  Referrals  [facility] Internal Referral to [facility], Specialty: Internal Medicine, Reason: fundus photo today, Start: 11/04/19 13:37:00 CST  Clinic Follow up, *Est. 03/04/20 3:00:00 CST, Order for future visit, HIV (human immunodeficiency virus infection)(  Confirmed  ), [facility]   Problem List/Past Medical History  Ongoing  HIV (human immunodeficiency virus infection)(  Confirmed  )  Insomnia(  Confirmed  )  Historical  Depressive disorder(  Confirmed  )  HIV  Vitamin D deficiency(  Confirmed  )  Procedure/Surgical History  Hernia Repair Ventral (None) (07/29/2015)  Other open umbilical herniorrhaphy (07/29/2015)  Repair umbilical hernia, age 5 years or older; reducible (07/29/2015)  No   Medications  acyclovir 400 mg oral tablet, 400 mg= 1 tab(s), Oral, BID, PRN, 2 refills  amitriptyline 25 mg oral tablet, 25 mg= 1 tab(s), Oral, Once a day (at bedtime), 3 refills  Descovy 200 mg-25 mg oral tablet, 1 tab(s), Oral, Daily, 4 refills  ergocalciferol 50,000 intUnit oral capsule, 40315 IntUnit= 1 cap(s), Oral, QOWK  Gardasil 9 intramuscular suspension, 0.5 mL, IM, Once-Unscheduled  influenza virus vaccine, inactivated preserve-free pediatric quadrivalent  intramuscular suspension, 0.5 mL, IM, Once  paroxetine 20 mg oral tablet, 20 mg= 1 tab(s), Oral, Daily, 3 refills  Tivicay 50 mg oral tablet, 50 mg= 1 tab(s), Oral, Daily, 4 refills  Allergies  No Known Medication Allergies  Social History  Abuse/Neglect  No, No, Yes, 11/04/2019  Alcohol  Current, 07/13/2015  Employment/School  Employed, 09/22/2017  Exercise  Home/Environment  Lives with Alone., 09/22/2017  Nutrition/Health  Regular, 09/22/2017  Sexual  Sexually active: Yes., 09/22/2017  Substance Use  Never, 09/22/2017  Tobacco  Never (less than 100 in lifetime), N/A, 11/04/2019  Family History  Hypertension.: Mother.  Tobacco use.: Mother.  Immunizations  Vaccine Date Status   pneumococcal 23-polyvalent vaccine 03/22/2019 Given   human papillomavirus vaccine 03/22/2019 Given   meningococcal conjugate vaccine 12/03/2018 Given   influenza virus vaccine, inactivated 12/03/2018 Given   meningococcal conjugate vaccine 07/31/2018 Given   influenza virus vaccine, inactivated 12/29/2017 Given   influenza virus vaccine, inactivated 10/18/2016 Given   influenza virus vaccine, inactivated 03/17/2016 Given   tetanus/diphtheria/pertussis, acel(Tdap) 07/13/2015 Given   pneumococcal 13-valent conjugate vaccine 01/08/2015 Recorded   influenza virus vaccine, inactivated 01/08/2015 Recorded   pneumococcal 23-polyvalent vaccine 01/21/2014 Recorded   influenza virus vaccine, inactivated 01/21/2014 Recorded   hepatitis A-hepatitis B vaccine 12/01/2008 Recorded   Health Maintenance  Health Maintenance  ???Pending?(in the next year)  ??? ??OverDue  ??? ? ? ?Diabetes Screening due??and every?  ??? ? ? ?Alcohol Misuse Screening due??01/01/19??and every 1??year(s)  ??? ??Due?  ??? ? ? ?Influenza Vaccine due??11/04/19??and every?  ??? ??Due In Future?  ??? ? ? ?Obesity Screening not due until??01/01/20??and every 1??year(s)  ??? ? ? ?ADL Screening not due until??03/22/20??and every 1??year(s)  ??? ? ? ?Blood Pressure Screening not due  until??11/03/20??and every 1??year(s)  ??? ? ? ?Body Mass Index Check not due until??11/03/20??and every 1??year(s)  ???Satisfied?(in the past 1 year)  ??? ??Satisfied?  ??? ? ? ?ADL Screening on??03/22/19.??Satisfied by Mirza Woodruff LPN  ??? ? ? ?Blood Pressure Screening on??11/04/19.??Satisfied by Tony Mancilla  ??? ? ? ?Body Mass Index Check on??11/04/19.??Satisfied by Tony Mancilla  ??? ? ? ?Depression Screening on??11/04/19.??Satisfied by Tony Mancilla  ??? ? ? ?Diabetes Screening on??03/22/19.??Satisfied by Braxton Joyner  ??? ? ? ?Influenza Vaccine on??11/04/19.??Satisfied by Cindy Moreno  ??? ? ? ?Lipid Screening on??12/03/18.??Satisfied by Marcella Kirk  ??? ? ? ?Obesity Screening on??11/04/19.??Satisfied by Tony Mancilla  ?  anal pap 12/29/17 NIL, 11/19  anal ct/gc 3/17/16 neg, 12/29/17 neg, 11/19  oral ct/gc 3/17/16 neg, 12/29/17 neg, 11/19  urine ct/gc 3/17/16 neg, 12/29/17 neg, 11/19  ophth? 7/2017, 11/19  Lab Results  Test Name Test Result Date/Time Comments   Creatinine 1.00 mg/dL 03/22/2019 14:52 CDT    eGFR-AA >105 mL/min 03/22/2019 14:52 CDT    AST 16 unit/L 03/22/2019 14:52 CDT    ALT 26 unit/L 03/22/2019 14:52 CDT    Hgb A1c 5.6 % 12/03/2018 12:30 CST    Vit D 25 OH 18.83 ng/mL (Low) 12/03/2018 12:30 CST Vit D 25 Hydroxy Reference Range:  0 - 17 years - ? ? Deficiency: ? ? ? ? less than 20 ng/ml  ? ? ? ? Optimum level: ? > or = 20 ng/ml  18 yrs or older: ?Deficiency: ? ? ? ? less than 20 ng/ml  ? ? ? ? ? ? ? ? ? ? ? ? ?Insufficiency: ? ? 20 - -29 ng/ml  ? ? ? ? ? ? ? ? ? ? ? ? ?Optimum level: ?30 - 80 ng/ml  ? ? ? ? ? ? ? ? ? ? ? ? ?Possible toxicity: > or = 150 ng/ml   Chol 157 mg/dL 12/03/2018 12:30 CST    HDL 29 mg/dL (Low) 12/03/2018 12:30 CST    Trig 123 mg/dL 12/03/2018 12:30 CST     mg/dL 12/03/2018 12:30 CST    Chol/HDL 5.4 (High) 12/03/2018 12:30 CST    TSH 0.398 mIU/L 12/03/2018 12:30 CST    WBC 5.9 x10(3)/mcL 03/22/2019 14:52 CDT    Hgb 15.1 gm/dL  03/22/2019 14:52 CDT    Hct 44.5 % 03/22/2019 14:52 CDT    Platelet 189 x10(3)/mcL 03/22/2019 14:52 CDT    CD4 Pct 45.7 % 12/03/2018 12:30 CST    CD4 Absolute 1259 unit/L 12/03/2018 12:30 CST    Syphilis Ab Nonreactive 12/03/2018 12:30 CST    Hep C Ab Nonreactive 12/03/2018 12:30 CST    HIV1 RNA PCR-LC <20 cpy/mL 03/22/2019 14:52 CDT HIV-1 RNA not detected  ?  The reportable range for this assay is 20 to 10,000,000  copies HIV-1 RNA/mL.   Neg Cont Spot count passed 12/03/2018 12:30 CST    Panel A Spot Count 0 12/03/2018 12:30 CST    Panel B Spot Count 0 12/03/2018 12:30 CST

## 2022-05-02 NOTE — HISTORICAL [facility] CERNER
This is a historical note converted from [employer]. Formatting and pictures may have been removed.  Please reference [facility] for original formatting and attached multimedia. Chief Complaint  RTC med refills. Paxil didnt work, interested in something else, also not sleeping.  History of Present Illness  36 yo BM presents for HIV f/u visit. Reports 100% adherent to Descovy & Tivicay, pratik well but has only a couple of days left of meds.? Insurance lapsed & applied for Medicaid 1 week ago, no coverage for meds currently.? Stopped Paxil apprx 1 month ago, states was not working well any longer.??Mood down, no SI/HI.? Ambien not effective for sleep, has tried Trazodone 50mg which worked very well. Awaiting medicaid coverage to see eye doc for new eyeglasses.? Taking Vit D sporadically, has at home.? Refuses oral & anal swabs today.? Contemplating move/visit to see brother in [place], [place].? [place]  at [facility].  Review of Systems  ?  ?  Constitutional: negative except as stated in HPI  Eye: negative except as stated in HPI  ENMT: negative except as stated in HPI  Respiratory: negative except as stated in HPI  Cardiovascular: negative except as stated in HPI  Gastrointestinal: negative except as stated in HPI  Genitourinary: negative except as stated in HPI  Hema/Lymph: negative except as stated in HPI  Endocrine: negative except as stated in HPI  Immunologic: negative except as stated in HPI  Musculoskeletal: negative except as stated in HPI  Integumentary: negative except as stated in HPI  Neurologic: negative except as stated in HPI  ?   All Other ROS_ ?negative except as stated in HPI  Physical Exam  Vitals & Measurements  T:?36.8? ?C ?(Oral)? HR:?65?(Peripheral)? RR:?20? BP:?114/76? HT:?188?cm? HT:?188?cm? WT:?96.3?kg? WT:?96.3?kg? BMI:?27.25?  ?  ?  General: AAO X 4, afebrile  Eye: no icterus  HENT: oropharynx clear  Neck: supple  Respiratory: BBS CTA, non-labored, symmetrical  Cardiovascular:  S1S2, RRR  Gastrointestinal BS + 4 quadrants, NTND, soft, no organomegaly  Genitourinary: non-tender  Lymphatics: no lymphadenopathy  Musculoskeletal: MAEW, steady gait  Integumentary: WDI  Neurologic: CN II-XII intact  Assessment/Plan  1.?Human immunodeficiency virus infection  ?adherence/sexual health counseling done  cont Descovy & Tivicay daily, refer to HIV  today for copay cards to bridge gap until medicaid approved  labs today  rtc 3 mos w renetta on a Friday  will f/u with opth after medicaid approved  Ordered:  dolutegravir, 50 mg = 1 tab(s), Oral, Daily, # 30 tab(s), 3 Refill(s), Pharmacy: [facility]  emtricitabine-tenofovir, 1 tab(s), Oral, Daily, # 30 tab(s), 3 Refill(s), Pharmacy: [facility]  CBC w/ Auto Diff, Routine collect, *Est. 06/02/17 3:00:00 CDT, Blood, Order for future visit, *Est. Stop date 06/02/17 3:00:00 CDT, Lab Collect, Human immunodeficiency virus infection, On Exactly, 06/02/17 3:00:00 CDT  CD4 Lymphoctye Count, Routine collect, *Est. 06/02/17 3:00:00 CDT, Blood, Order for future visit, *Est. Stop date 06/02/17 3:00:00 CDT, Lab Collect, Human immunodeficiency virus infection, On Exactly, 06/02/17 3:00:00 CDT  Clinic Follow up, *Est. 09/02/17 3:00:00 CDT, Order for future visit, Human immunodeficiency virus infection, [facility]  Comprehensive Metabolic Panel, Routine collect, *Est. 06/02/17 3:00:00 CDT, Blood, Order for future visit, *Est. Stop date 06/02/17 3:00:00 CDT, Lab Collect, Human immunodeficiency virus infection, On Exactly, 06/02/17 3:00:00 CDT  HIV-1 RNA Qnt By Real-Time PCR-[facility] [id number], Routine collect, *Est. 06/02/17 3:00:00 CDT, Blood, Order for future visit, *Est. Stop date 06/02/17 3:00:00 CDT, Lab Collect, Human immunodeficiency virus infection, On Exactly, 06/02/17 3:00:00 CDT  Office/Outpatient Visit Level 5 Established 76711 PC, Human immunodeficiency virus infection  Recurrent HSV (herpes simplex virus)  Depression  Insomnia   Vitamin D deficiency, [facility], 06/02/17 10:30:00 CDT  ?  2.?Depression  ?d/c paxil, replace with wellbutrin daily  Ordered:  buPROPion, 150 mg = 1 tab(s), Oral, q24hr, # 30 tab(s), 3 Refill(s), Pharmacy: [facility]  Office/Outpatient Visit Level 5 Established 62655 , Human immunodeficiency virus infection  Recurrent HSV (herpes simplex virus)  Depression  Insomnia  Vitamin D deficiency, [facility], 06/02/17 10:30:00 CDT  ?  3.?Insomnia  ?d/c ambien, replace with trazodone  Ordered:  traZODone, 50 mg = 1 tab(s), Oral, Once a day (at bedtime), # 30 tab(s), 3 Refill(s), Pharmacy: [facility]  Office/Outpatient Visit Level 5 Established 26399 , Human immunodeficiency virus infection  Recurrent HSV (herpes simplex virus)  Depression  Insomnia  Vitamin D deficiency, [facility], 06/02/17 10:30:00 CDT  ?  4.?Vitamin D deficiency  ?resume vit d2 q wk as rxd  Ordered:  Office/Outpatient Visit Level 5 Established 02583 , Human immunodeficiency virus infection  Recurrent HSV (herpes simplex virus)  Depression  Insomnia  Vitamin D deficiency, [facility], 06/02/17 10:30:00 CDT  ?  Recurrent HSV (herpes simplex virus)  ok to use prn outbreaks if desired  Ordered:  acyclovir, 400 mg = 1 tab(s), Oral, 5x/Day, PRN PRN other (see comment), as needed for outbreaks, # 35 tab(s), 2 Refill(s), other reason (Rx)  Office/Outpatient Visit Level 5 Established 40174 , Human immunodeficiency virus infection  Recurrent HSV (herpes simplex virus)  Depression  Insomnia  Vitamin D deficiency, [facility], 06/02/17 10:30:00 CDT  ?   Problem List/Past Medical History  HIV (human immunodeficiency virus infection)(  Confirmed  )  Insomnia(  Confirmed  )  Vitamin D deficiency(  Confirmed  )  Historical  Depressive disorder(  Confirmed  )  HIV  Procedure/Surgical History  Hernia Repair Ventral (None) (07/29/2015), Other open umbilical herniorrhaphy (07/29/2015), Repair umbilical  hernia, age 5 years or older; reducible (07/29/2015), No.  Medications  acyclovir 400 mg oral tablet, 400 mg, 1 tab(s), Oral, 5x/Day, PRN, 2 refills  buPROPion 150 mg/24 hours (XL) oral tablet, extended release, 150 mg, 1 tab(s), Oral, q24hr, 3 refills  Descovy 200 mg-25 mg oral tablet, 1 tab(s), Oral, Daily, 3 refills  ergocalciferol 50,000 intUnit oral capsule, 51213 IntUnit, 1 cap(s), Oral, qWeek, 5 refills  Tivicay 50 mg oral tablet, 50 mg, 1 tab(s), Oral, Daily, 3 refills  traZODONE 50 mg oral tablet ( Desyrel ), 50 mg, 1 tab(s), Oral, Once a day (at bedtime), 3 refills  Allergies  No Known Medication Allergies  Social History  Alcohol  Current, Beer, 1-2 times per month, 1 drinks/episode average. Previous treatment: None.  Current  Substance Abuse  Past  Tobacco  Former smoker  Former smoker  Family History  Hypertension.: Mother.  Tobacco use.: Mother.  Immunizations  UTD  Health Maintenance  anal ct/gc 3/17/16 NIL  oral ct/gc 3/17/16 neg  urine ct/gc 3/17/16 neg  ophth? will go to [facility] [1]  Lab Results  Creatinine 0.85 mg/dL 10/18/2016 15:11 CDT  AST 25 unit/L 10/18/2016 15:11 CDT  ALT 30 unit/L 10/18/2016 15:11 CDT  Vit D 25 OH 12.84 ng/mL 10/18/2016 15:11 CDT (Low)  HLA -NT Negative 10/18/2016 15:11 CDT  Chol 153 mg/dL 10/18/2016 15:11 CDT  HDL 33 mg/dL 10/18/2016 15:11 CDT (Low)  Trig 158 mg/dL 10/18/2016 15:11 CDT (High)  LDL 88 mg/dL 10/18/2016 15:11 CDT  Chol/HDL 4.6 10/18/2016 15:11 CDT  T4 Free 0.65 ng/mL 10/18/2016 15:11 CDT  TSH 0.48 mIU/mL 10/18/2016 15:11 CDT (Low)  WBC 5.1 x10(3)/mcL 10/18/2016 15:11 CDT  Hgb 15.4 gm/dL 10/18/2016 15:11 CDT  Hct 48.0 % 10/18/2016 15:11 CDT  Platelet 156 x10(3)/mcL 10/18/2016 15:11 CDT  CD4 Pct 46.0 % 10/18/2016 15:11 CDT  CD4 Absolute 1104 unit/L 10/18/2016 15:11 CDT  Hep C Ab Nonreactive 10/18/2016 15:11 CDT  RPR Non-Reactive 10/18/2016 15:11 CDT  Chlamydia trach-LC Negative 10/18/2016 15:11 CDT  N gonorrhoeae JUNI-LC Negative 10/18/2016 15:11  CDT  HIV-1 RNA by PCR-LC <20 cpy/mL 10/18/2016 15:11 CDT  QuantiFERON Gold-LC Negative 10/18/2016 15:11 CDT     [1]?Office Visit Note; Cindy Maddox 10/18/2016 14:39 CDT

## 2022-05-02 NOTE — HISTORICAL [facility] CERNER
This is a historical note converted from Adia. Formatting and pictures may have been removed.  Please reference Adia for original formatting and attached multimedia. Chief Complaint  B20 f/u  History of Present Illness  Rafi is a 40 yo AAM presenting today for HIV F/u visit.? He reports 100% adherent to Descovy & Tivicay, tolerates well with viral suppression.? Last labs 3/1/21, VL 80, Cd4 1131.? Will update labs today.? He states that mood is stable on Paxil.? He uses amitriptyline PRN sleep.? He is amenable to routine anal?pap?today.??Denies any anal intercourse ever, nor?performing any oral intercourse since last STI screening.? He tells me that he?has been struggling with ED?for about the past year, really since starting Paxil.? He states that he doesnt?even really have morning erections.??Appreciates pharmaceutical?assistance, agrees to use condoms.? He will?be going [place]?with a group of friends in a couple of weeks, condoms provided.?  ?  3/1/21  Rafi is a 41 yo BM presenting today for HIV f/u visit.? He reports 100% adherent to Descovy & Tivicay, tolerates well with viral suppression. Mood is stable on Paxil.? He is sleeping well with amitriptyline. He tells me that he is not receiving vitamin d from [facility], will send prescription.? He is still having daily headaches, did not receive Fioricet from [facility].? He requests written prescription to fill locally.? He also tells me that he thought he had COVID last month due to loss of taste & smell.? He requests ab testing.? Fundus photo done 1/24/21, WNL. He requests to defer anal pap to next visit.  ?   10/30/20  Rafi is a 41 yo BM presenting today for HIV f/u visit.? He reports 100% adherent to Descovy & Tivicay, tolerates well with viral suppression.? He is taking paxil daily as prescribed & mood is much improved.? He uses amitriptyline prn sleep, effective.? He tells me that he [place] [facility] [place] & received new  eyeglasses.? He reports having daily?migraine headaches 9/10 nearly daily x 2 months.? He reports minimal response to ibuprofen 800mg & Lortab 7.5mg.? He denies aura, no n/v.? No visual changes, or changes in muscle strength/mobility.? He is amenable to Flu & Gardasil 9 #3 vaccination today.  ?  Review of Systems  ?  ?  Constitutional: negative except as stated in HPI  Eye: negative except as stated in HPI  ENMT: negative except as stated in HPI  Respiratory: negative except as stated in HPI  Cardiovascular: negative except as stated in HPI  Gastrointestinal: negative except as stated in HPI  Genitourinary: negative except as stated in HPI  Hema/Lymph: negative except as stated in HPI  Endocrine: negative except as stated in HPI  Immunologic: negative except as stated in HPI  Musculoskeletal: negative except as stated in HPI  Integumentary: negative except as stated in HPI  Neurologic: negative except as stated in HPI  ?   All Other ROS_ ?negative except as stated in HPI  Physical Exam  Vitals & Measurements  T:?36.9? ?C (Oral)? HR:?67(Peripheral)? RR:?18? BP:?105/71?  HT:?189.00?cm? WT:?97.400?kg? BMI:?27.27?  ?  ?  General: AAO X 4, afebrile  Eye: no icterus  HENT: oropharynx clear  Neck: supple  Respiratory: BBS CTA, non-labored, symmetrical  Cardiovascular: S1S2, RRR  Gastrointestinal BS + 4 quadrants, NTND, soft, no organomegaly  Genitourinary: non-tender  Lymphatics: no lymphadenopathy  Musculoskeletal: MAEW, steady gait  Integumentary: WDI  Neurologic: CN II-XII intact  Assessment/Plan  1.?HIV (human immunodeficiency virus infection)(  Confirmed  )?B20  adherence/sexual health counseling done  cont Descovy & Tivicay daily  labs today  rtc?4 months w Cindy  discussed opportunity for STR for simplification, will try sample of Biktarvy & let me know if wants to revise ART  Ordered:  dolutegravir, 50 mg = 1 tab(s), Oral, Daily, # 30 tab(s), 3 Refill(s), Pharmacy: [facility], 189, cm, Height/Length  Dosing, 07/01/21 8:58:00 CDT, 97.4, kg, Weight Dosing, 07/01/21 8:58:00 CDT  emtricitabine-tenofovir, 1 tab(s), Oral, Daily, # 30 tab(s), 3 Refill(s), Pharmacy: [facility], 189, cm, Height/Length Dosing, 07/01/21 8:58:00 CDT, 97.4, kg, Weight Dosing, 07/01/21 8:58:00 CDT  1160F- Medication reconciliation completed during visit, HIV (human immunodeficiency virus infection)(  Confirmed  )  Insomnia(  Confirmed  )  Depression  Vitamin D deficiency  Cancer screening  Erectile dysfunction, [facility], 07/01/21 9:33:00 CDT  Cd4 Lymphocytes., Routine collect, 07/01/21 9:42:00 CDT, Blood, Stop date 07/01/21 9:42:00 CDT, Lab Collect, HIV (human immunodeficiency virus infection)(  Confirmed  ), 07/01/21 9:42:00 CDT  Chlam trachom & N gonorrhoeae by JUNI-[facility] [id number], Routine collect, Urine, 07/01/21 11:30:00 CDT, Stop date 07/01/21 11:30:00 CDT, Nurse collect, HIV (human immunodeficiency virus infection)(  Confirmed  ), 07/01/21 11:30:00 CDT  Clinic Follow up, *Est. 11/01/21 3:00:00 CDT, Order for future visit, HIV (human immunodeficiency virus infection)(  Confirmed  ), [facility]  Office/Outpatient Visit Level 4 Established 60923 PC, HIV (human immunodeficiency virus infection)(  Confirmed  )  Insomnia(  Confirmed  )  Depression  Vitamin D deficiency  Cancer screening  Erectile dysfunction, [facility], 07/01/21 9:33:00 CDT  RNA, PCR(NonGraph)rfx/GenoPRIme(R)-[facility] [id number], Routine collect, 07/01/21 9:42:00 CDT, Blood, Stop date 07/01/21 9:42:00 CDT, Lab Collect, HIV (human immunodeficiency virus infection)(  Confirmed  ), 07/01/21 9:42:00 CDT  ?  2.?Insomnia(  Confirmed  )?G47.00  ?controlled  continue amitriptyline prn  Ordered:  amitriptyline, 50 mg = 1 tab(s), Oral, Once a day (at bedtime), # 30 tab(s), 3 Refill(s), Pharmacy: [facility], 189, cm, Height/Length Dosing, 07/01/21 8:58:00 CDT, 97.4, kg, Weight Dosing, 07/01/21 8:58:00 CDT  1160F- Medication reconciliation  completed during visit, HIV (human immunodeficiency virus infection)(  Confirmed  )  Insomnia(  Confirmed  )  Depression  Vitamin D deficiency  Cancer screening  Erectile dysfunction, [facility], 07/01/21 9:33:00 CDT  Office/Outpatient Visit Level 4 Established 66696 PC, HIV (human immunodeficiency virus infection)(  Confirmed  )  Insomnia(  Confirmed  )  Depression  Vitamin D deficiency  Cancer screening  Erectile dysfunction, [facility], 07/01/21 9:33:00 CDT  ?  3.?Depression?F32.9  ??controlled  continue paxil  Ordered:  1160F- Medication reconciliation completed during visit, HIV (human immunodeficiency virus infection)(  Confirmed  )  Insomnia(  Confirmed  )  Depression  Vitamin D deficiency  Cancer screening  Erectile dysfunction, [facility], 07/01/21 9:33:00 CDT  Office/Outpatient Visit Level 4 Established 61912 , HIV (human immunodeficiency virus infection)(  Confirmed  )  Insomnia(  Confirmed  )  Depression  Vitamin D deficiency  Cancer screening  Erectile dysfunction, [facility], 07/01/21 9:33:00 CDT  ?  4.?Vitamin D deficiency?E55.9  ?continue?vitamin d2 weekly as prescribed  Ordered:  ergocalciferol, 50,000 IntUnit = 1 cap(s), Oral, QOWK, # 5 cap(s), 3 Refill(s), Pharmacy: [facility], 189, cm, Height/Length Dosing, 07/01/21 8:58:00 CDT, 97.4, kg, Weight Dosing, 07/01/21 8:58:00 CDT  1160F- Medication reconciliation completed during visit, HIV (human immunodeficiency virus infection)(  Confirmed  )  Insomnia(  Confirmed  )  Depression  Vitamin D deficiency  Cancer screening  Erectile dysfunction, [facility], 07/01/21 9:33:00 CDT  Office/Outpatient Visit Level 4 Established 41728 PC, HIV (human immunodeficiency virus infection)(  Confirmed  )  Insomnia(  Confirmed  )  Depression  Vitamin D deficiency  Cancer screening  Erectile dysfunction, [facility], 07/01/21 9:33:00 CDT  ?  5.?Cancer screening?Z12.9  ?anal pap  collected  Ordered:  1160F- Medication reconciliation completed during visit, HIV (human immunodeficiency virus infection)(  Confirmed  )  Insomnia(  Confirmed  )  Depression  Vitamin D deficiency  Cancer screening  Erectile dysfunction, [facility], 07/01/21 9:33:00 CDT  Office/Outpatient Visit Level 4 Established 86218 PC, HIV (human immunodeficiency virus infection)(  Confirmed  )  Insomnia(  Confirmed  )  Depression  Vitamin D deficiency  Cancer screening  Erectile dysfunction, [facility], 07/01/21 9:33:00 CDT  Pathology Non-Gyn Request [facility], 07/01/21 10:16:00 CDT, Routine, AP Specimen, anal swab, anal cancer screen/hiv, Collected, Nurse Collect, Print Label, RT - Routine, [email or web address], Hold Until Collected, Cancer screening, 07/01/21 10:16:00 CDT  ?  6.?Erectile dysfunction?N52.9  ?sildenafil 20 mg po daily prn ED, use with condoms  Ordered:  1160F- Medication reconciliation completed during visit, HIV (human immunodeficiency virus infection)(  Confirmed  )  Insomnia(  Confirmed  )  Depression  Vitamin D deficiency  Cancer screening  Erectile dysfunction, [facility], 07/01/21 9:33:00 CDT  Office/Outpatient Visit Level 4 Established 47699 PC, HIV (human immunodeficiency virus infection)(  Confirmed  )  Insomnia(  Confirmed  )  Depression  Vitamin D deficiency  Cancer screening  Erectile dysfunction, [facility], 07/01/21 9:33:00 CDT  ?  Orders:  PARoxetine, 20 mg = 1 tab(s), Oral, Daily, # 30 tab(s), 3 Refill(s), Pharmacy: [facility], 189, cm, Height/Length Dosing, 07/01/21 8:58:00 CDT, 97.4, kg, Weight Dosing, 07/01/21 8:58:00 CDT  sildenafil, 20 mg = 1 tab(s), Oral, Daily, PRN PRN erectile dysfunction, # 30 tab(s), 1 Refill(s)  Referrals  Clinic Follow up, *Est. 11/01/21 3:00:00 CDT, Order for future visit, HIV (human immunodeficiency virus infection)(  Confirmed  ), [facility]   Problem List/Past Medical History  Ongoing  HIV (human immunodeficiency virus  infection)(  Confirmed  )  Insomnia(  Confirmed  )  Historical  Depressive disorder(  Confirmed  )  HIV  Vitamin D deficiency(  Confirmed  )  Procedure/Surgical History  Hernia Repair Ventral (None) (07/29/2015)  Other open umbilical herniorrhaphy (07/29/2015)  Repair umbilical hernia, age 5 years or older; reducible (07/29/2015)  No   Medications  acyclovir 400 mg oral tablet, 400 mg= 1 tab(s), Oral, BID, PRN, 3 refills  amitriptyline 50 mg oral tablet, 50 mg= 1 tab(s), Oral, Once a day (at bedtime), 3 refills  Descovy 200 mg-25 mg oral tablet, 1 tab(s), Oral, Daily, 3 refills  ergocalciferol 50,000 intUnit oral capsule, 13944 IntUnit= 1 cap(s), Oral, QOWK, 3 refills  Fioricet oral capsule, 2 cap(s), Oral, q4hr, PRN, 1 refills  paroxetine 20 mg oral tablet, 20 mg= 1 tab(s), Oral, Daily, 3 refills  Revatio 20 mg oral tablet, 20 mg= 1 tab(s), Oral, Daily, PRN, 1 refills  Tivicay 50 mg oral tablet, 50 mg= 1 tab(s), Oral, Daily, 3 refills  Allergies  No Known Medication Allergies  Social History  Abuse/Neglect  No, No, Yes, 07/01/2021  Alcohol  Current, 07/13/2015  Employment/School  Employed, 09/22/2017  Exercise  Home/Environment  Lives with Alone., 09/22/2017  Nutrition/Health  Regular, 09/22/2017  Sexual  Sexually active: Yes., 09/22/2017  Substance Use  Never, 09/22/2017  Tobacco  Never (less than 100 in lifetime), N/A, 07/01/2021  Family History  Hypertension.: Mother.  Tobacco use.: Mother.  Immunizations  Vaccine Date Status   influenza virus vaccine, inactivated 10/30/2020 Given   human papillomavirus vaccine 10/30/2020 Given   human papillomavirus vaccine 11/04/2019 Given   influenza virus vaccine, inactivated 11/04/2019 Given   pneumococcal 23-polyvalent vaccine 03/22/2019 Given   human papillomavirus vaccine 03/22/2019 Given   meningococcal conjugate vaccine 12/03/2018 Given   influenza virus vaccine, inactivated 12/03/2018 Given   meningococcal conjugate vaccine 07/31/2018 Given   influenza virus  vaccine, inactivated 12/29/2017 Given   influenza virus vaccine, inactivated 10/18/2016 Given   influenza virus vaccine, inactivated 03/17/2016 Given   tetanus/diphtheria/pertussis, acel(Tdap) 07/13/2015 Given   pneumococcal 13-valent conjugate vaccine 01/08/2015 Recorded   influenza virus vaccine, inactivated 01/08/2015 Recorded   pneumococcal 23-polyvalent vaccine 01/21/2014 Recorded   influenza virus vaccine, inactivated 01/21/2014 Recorded   influenza virus vaccine, inactivated 09/30/2011 Recorded   influenza virus vaccine, inactivated 10/26/2010 Recorded   influenza virus vaccine, inactivated 10/09/2009 Recorded   hepatitis A-hepatitis B vaccine 12/01/2008 Recorded   pneumococcal 7-valent vaccine 09/17/2008 Recorded   Health Maintenance  Health Maintenance  ???Pending?(in the next year)  ??? ??OverDue  ??? ? ? ?Alcohol Misuse Screening due??01/02/21??and every 1??year(s)  ??? ??Due In Future?  ??? ? ? ?Influenza Vaccine not due until??10/01/21??and every 1??day(s)  ??? ? ? ?Obesity Screening not due until??01/01/22??and every 1??year(s)  ??? ? ? ?ADL Screening not due until??03/01/22??and every 1??year(s)  ???Satisfied?(in the past 1 year)  ??? ??Satisfied?  ??? ? ? ?ADL Screening on??03/01/21.??Satisfied by Jean Claude Clemens  ??? ? ? ?Blood Pressure Screening on??07/01/21.??Satisfied by Sanjuana Fink  ??? ? ? ?Body Mass Index Check on??07/01/21.??Satisfied by Sanjuana Fink  ??? ? ? ?Depression Screening on??07/01/21.??Satisfied by Sanjuana Fink  ??? ? ? ?Diabetes Screening on??07/01/21.??Satisfied by Heena Poole  ??? ? ? ?Influenza Vaccine on??10/30/20.??Satisfied by Nataliya Weston  ??? ? ? ?Lipid Screening on??10/16/20.??Satisfied by Shahana Patel  ??? ? ? ?Obesity Screening on??07/01/21.??Satisfied by Sanjuana Fink  ?  anal pap 12/29/17 NIL, 11/19 QNS, 7/1/21  anal ct/gc 3/17/16 neg, 12/29/17 neg, 11/19 neg  oral ct/gc 3/17/16 neg, 12/29/17 neg, 11/19 neg  urine  ct/gc 3/17/16 neg, 12/29/17 neg, 11/19 neg, 7/21  ophth? 7/2017, 8/2020 Dr. Estrada , 1/24/21  Lab Results  Test Name Test Result Date/Time Comments   Creatinine 1.04 mg/dL 03/01/2021 11:23 CST    eGFR- 03/01/2021 11:23 CST    AST 19 unit/L 03/01/2021 11:23 CST    ALT 25 unit/L 03/01/2021 11:23 CST    WBC 5.2 x10(3)/mcL 03/01/2021 11:23 CST    Hgb 15.1 gm/dL 03/01/2021 11:23 CST    Hct 45.6 % 03/01/2021 11:23 CST    Platelet 174 x10(3)/mcL 03/01/2021 11:23 CST    CD4 Pct 47.3 % 03/01/2021 11:23 CST    CD4 Absolute 1131 unit/L 03/01/2021 11:23 CST    Syphilis Ab Nonreactive 10/16/2020 13:38 CDT    Hep C Ab Nonreactive 10/16/2020 13:38 CDT Interpretive Information (HCV):  ? ? ? ? ? ? ? ?Nonreactive: ? ?Antibodies to HCV not detected.  ? ? ? ? ? ? ? ?Reactive: ? ? ? ?A reactive result should be followed by nucleic acid testing for  ? ? ? ? ? ? ? ? ?HCV RNA for identifying current hepatitis C virus(HCV) infection.  ? ? ? ? ? ? ? ?Equivocal: ? ? ?Another specimen should be obtained from patient for further testing.   HIV1 RNA PCR-LC 80 cpy/mL 03/01/2021 11:23 CST ?  The reportable range for this assay is 20 to 10,000,000  copies HIV-1 RNA/mL.   T-Spot.TB NEG, see scan 10/16/2020 13:38 CDT Review results in Power chart, Notes by date of service (fin#), Laboratory Docs, Outside lab., Limitations (from T-SPOT.TB package insert, p.15)  Results from T-SPOT.TB testing must be used in conjunction with each individuals epidemiological history, current medical status and results of other diagnostic evaluation.  ?  The performance of T-SPOT.TB has not been adequately evaluated with specimens from individuals younger than 17 yrs, in pregnant women, and in patients with hemophilia.  ?  A false positive result was obtained for T-SPOT.TB when tested with M.xenopi, M.kansasii and M.gordonae. ?While ESAT-6 and CFP10 antigens are absent from BCG strains of M.bovis and from most enviromental mycobacteria, it is possible that a  positive T-SPOT.TB result may be due to infection with M.kansaii, M.szulgai, M.gordonae or M.marinum. ?Alternative tests would be required if these infections are suspected.  ?  A negative test result does not exclude the possibility of exposure to, or infection with M.tuberculosis. ?Patients with recet exposure to TB infected individuals exhibiting a negative T-SPOT.MB result should be considered for retesting within 6 wks or if other relevant clinical symptoms indicate possible infection.  ?  A positive test result does not rule in active TB disease; other tests should be performed to confirm the diagnosis of active TB disease such as sputum smear and culture, PCR, and chest radiograph.  T-SPOT.TB has not been evaluated in subjects who have received > 1 month of anti-TB therapy.  Refrigerated and frozen samples are not recommended for use with T-SPOT.TB test.

## 2022-05-02 NOTE — HISTORICAL [facility] CERNER
This is a historical note converted from [employer]. Formatting and pictures may have been removed.  Please reference [facility] for original formatting and attached multimedia. Chief Complaint  B20 F/U  History of Present Illness  Rafi is a 39 yo BM presenting today for HIV f/u visit.??Reports 98%% adherent to Descovy & Tivicay, tolerates well with viral suppression.? Missed 1-2 doses per month.? Trazodone no longer effective at 50mg dose, would like to try higher dose, no hangover effects noted.? Mood stable.? Diet high in sugar, will reduce sugars in diet, specifically soda.? Taking vit d2 1-2 times per month, levels stable.? Left foot wound completely resolved with antibiotics rx last visit.? Will see private opth for routine eye exam.? Amenable to recommended vaccination today.  ?  12/29/17  Rafi is a 38 yo MSW BM presenting today for HIV f/u visit.? Reports 100% adherent to Descovy & Tivicay, tolerating very well and is virally suppressed and immunocompetent. Received trazodone, now sleeping much better & mood is improved as well.? Taking vit d2 apprx 2 times per month, often forgets to take.? Desires flu vax today.? Denies any new sexual partners or hx of syphilis.? Not fasting, ate full meal this am.? Did not take Lamisil as rxd, left foot wound not healed.? Still has meds at home.  ?  Review of Systems  ?  ?  Constitutional: negative except as stated in HPI  Eye: negative except as stated in HPI  ENMT: negative except as stated in HPI  Respiratory: negative except as stated in HPI  Cardiovascular: negative except as stated in HPI  Gastrointestinal: negative except as stated in HPI  Genitourinary: negative except as stated in HPI  Hema/Lymph: negative except as stated in HPI  Endocrine: negative except as stated in HPI  Immunologic: negative except as stated in HPI  Musculoskeletal: negative except as stated in HPI  Integumentary: negative except as stated in HPI  Neurologic: negative except as stated in HPI  ?    All Other ROS_ ?negative except as stated in HPI  Physical Exam  Vitals & Measurements  T:?36.9? ?C (Oral)? HR:?68(Peripheral)? RR:?20? BP:?110/68?  HT:?188?cm? HT:?188?cm? WT:?97.1?kg? WT:?97.1?kg? BMI:?27.47?  ?  ?  General: AAO X 4, afebrile  Eye: no icterus  HENT: oropharynx clear  Neck: supple  Respiratory: BBS CTA, non-labored, symmetrical  Cardiovascular: S1S2, RRR  Gastrointestinal BS + 4 quadrants, NTND, soft, no organomegaly  Genitourinary: non-tender  Lymphatics: no lymphadenopathy  Musculoskeletal: MAEW, steady gait  Integumentary: WDI  Neurologic: CN II-XII intact  Assessment/Plan  1.?HIV (human immunodeficiency virus infection)(  Confirmed  )  adherence/sexual health counseling done  cont Descovy & Tivicay daily  labs today  rtc?4 mos w?renetta??  Ordered:  dolutegravir, 50 mg = 1 tab(s), Oral, Daily, # 30 tab(s), 4 Refill(s), Pharmacy: [facility]  emtricitabine-tenofovir, 1 tab(s), Oral, Daily, # 30 tab(s), 4 Refill(s), Pharmacy: [facility]  CD4 Lymphoctye Count, Routine collect, 07/31/18 10:44:00 CDT, Blood, Stop date 07/31/18 10:44:00 CDT, Lab Collect, HIV (human immunodeficiency virus infection)(  Confirmed  ), 07/31/18 10:44:00 CDT  Clinic Follow up, 07/31/18 10:40:00 CDT, HIV (human immunodeficiency virus infection)(  Confirmed  ), [facility]  Comprehensive Metabolic Panel, Routine collect, 07/31/18 10:44:00 CDT, Blood, Stop date 07/31/18 10:44:00 CDT, Lab Collect, HIV (human immunodeficiency virus infection)(  Confirmed  ), 07/31/18 10:44:00 CDT  Office/Outpatient Visit Level 5 Established 57215 PC, HIV (human immunodeficiency virus infection)(  Confirmed  )  Vitamin D deficiency(  Confirmed  )  Insomnia(  Confirmed  )  Need for vaccination  Depression, [facility], 07/31/18 10:12:00 CDT  RNA, PCR(NonGraph)rfx/GenoPRIme(R)-[facility] [id number], Routine collect, 07/31/18 10:44:00 CDT, Blood, Stop date 07/31/18 10:44:00 CDT, Lab Collect, HIV (human immunodeficiency  virus infection)(  Confirmed  ), 07/31/18 10:44:00 CDT  ?  2.?Vitamin D deficiency(  Confirmed  )  ? controlled, cont vit d2 every other week  Ordered:  Office/Outpatient Visit Level 5 Established 84177 PC, HIV (human immunodeficiency virus infection)(  Confirmed  )  Vitamin D deficiency(  Confirmed  )  Insomnia(  Confirmed  )  Need for vaccination  Depression, [facility], 07/31/18 10:12:00 CDT  ?  3.?Insomnia(  Confirmed  )  ? uncontrolled, increase trazodone to 100mg qhs  Ordered:  traZODone, 100 mg = 1 tab(s), Oral, Once a day (at bedtime), # 30 tab(s), 4 Refill(s), Pharmacy: [facility]  Office/Outpatient Visit Level 5 Established 81914 PC, HIV (human immunodeficiency virus infection)(  Confirmed  )  Vitamin D deficiency(  Confirmed  )  Insomnia(  Confirmed  )  Need for vaccination  Depression, [facility], 07/31/18 10:12:00 CDT  ?  4.?Depression  ? controlled with trazodone  Ordered:  traZODone, 100 mg = 1 tab(s), Oral, Once a day (at bedtime), # 30 tab(s), 4 Refill(s), Pharmacy: [facility]  Office/Outpatient Visit Level 5 Established 93037 PC, HIV (human immunodeficiency virus infection)(  Confirmed  )  Vitamin D deficiency(  Confirmed  )  Insomnia(  Confirmed  )  Need for vaccination  Depression, [facility], 07/31/18 10:12:00 CDT  ?  5.?Need for vaccination  ? ??menveo #1 today, #2 next visit  Ordered:  meningococcal conjugate vaccine, 0.5 mL, form: Powder-Inj, IM, Once, first dose 07/31/18 10:12:00 CDT, stop date 07/31/18 10:12:00 CDT  Office/Outpatient Visit Level 5 Established 26022 PC, HIV (human immunodeficiency virus infection)(  Confirmed  )  Vitamin D deficiency(  Confirmed  )  Insomnia(  Confirmed  )  Need for vaccination  Depression, [facility], 07/31/18 10:12:00 CDT  ?   Problem List/Past Medical History  Ongoing  HIV (human immunodeficiency virus infection)(  Confirmed  )  Insomnia(  Confirmed  )  Vitamin D deficiency(  Confirmed   )  Historical  Depressive disorder(  Confirmed  )  HIV  Procedure/Surgical History  Hernia Repair Ventral (None) (07/29/2015)  Other open umbilical herniorrhaphy (07/29/2015)  Repair umbilical hernia, age 5 years or older; reducible (07/29/2015)  No   Medications  acyclovir 400 mg oral tablet, 400 mg= 1 tab(s), Oral, 5x/Day, PRN, 2 refills  Descovy 200 mg-25 mg oral tablet, 1 tab(s), Oral, Daily, 4 refills  ergocalciferol 50,000 intUnit oral capsule, 20022 IntUnit= 1 cap(s), Oral, qWeek, 1 refills,? ?Still taking, not as prescribed: Forgets to take  meningococcal conjugate vaccine intramuscular injection, 0.5 mL, IM, Once  Tivicay 50 mg oral tablet, 50 mg= 1 tab(s), Oral, Daily, 4 refills  traZODone 100 mg oral tablet, 100 mg= 1 tab(s), Oral, Once a day (at bedtime), 4 refills  Allergies  No Known Medication Allergies  Social History  Alcohol  Current, 07/13/2015  Employment/School  Employed, 09/22/2017  Exercise  Home/Environment  Lives with Alone., 09/22/2017  Nutrition/Health  Regular, 09/22/2017  Sexual  Sexually active: Yes., 09/22/2017  Substance Abuse  Never, 09/22/2017  Tobacco  Current some day smoker Use:. Previous treatment: None. Ready to change: No. Household tobacco concerns: No., 07/31/2018  Former smoker, 07/21/2015  Former smoker, 07/21/2015  Family History  Hypertension.: Mother.  Tobacco use.: Mother.  Immunizations  Vaccine Date Status   influenza virus vaccine, inactivated 12/29/2017 Given   influenza virus vaccine, inactivated 10/18/2016 Given   influenza virus vaccine, inactivated 03/17/2016 Given   tetanus/diphtheria/pertussis, acel(Tdap) 07/13/2015 Given   pneumococcal 13-valent conjugate vaccine 01/08/2015 Recorded   influenza virus vaccine, inactivated 01/08/2015 Recorded   pneumococcal 23-polyvalent vaccine 01/21/2014 Recorded   influenza virus vaccine, inactivated 01/21/2014 Recorded   hepatitis A-hepatitis B vaccine 12/01/2008 Recorded   Health Maintenance  Health  Maintenance  ???Pending?(in the next year)  ??? ??OverDue  ??? ? ? ?Diabetes Screening due??and every?  ??? ? ? ?Influenza Vaccine due??and every?  ??? ??Due?  ??? ? ? ?Alcohol Misuse Screening due??07/31/18??and every 1??year(s)  ??? ??Due In Future?  ??? ? ? ?Blood Pressure Screening not due until??12/29/18??and every 1??year(s)  ??? ? ? ?Body Mass Index Check not due until??12/29/18??and every 1??year(s)  ???Satisfied?(in the past 1 year)  ??? ??Satisfied?  ??? ? ? ?Blood Pressure Screening on??07/31/18.??Satisfied by Mone Unger LPN  ??? ? ? ?Body Mass Index Check on??07/31/18.??Satisfied by Mone Unger LPN  ??? ? ? ?Depression Screening on??07/31/18.??Satisfied by Mone Unger LPN  ??? ? ? ?Diabetes Screening on??07/31/18.??Satisfied by Cindy Maddox  ??? ? ? ?Influenza Vaccine on??12/29/17.??Satisfied by Gunjan Pacheco LPN  ??? ? ? ?Lipid Screening on??12/29/17.??Satisfied by Marcella Kirk  ??? ? ? ?Obesity Screening on??07/31/18.??Satisfied by Mone Unger LPN  ??? ? ? ?Smoking Cessation on??07/31/18.??Satisfied by Mone Unger LPN  ?  ?  anal pap 12/29/17 NIL  anal ct/gc 3/17/16 neg, 12/29/17 neg  oral ct/gc 3/17/16 neg, 12/29/17 neg  urine ct/gc 3/17/16 neg, 12/29/17 neg  ophth? 7/2017  Lab Results  Test Name Test Result Date/Time Comments   Creatinine 1.20 mg/dL 12/29/2017 09:46 CST    AST 39 unit/L (High) 12/29/2017 09:46 CST    ALT 34 unit/L 12/29/2017 09:46 CST    Hgb A1c 5.9 % 12/29/2017 09:46 CST    Vit D 25 OH 47.01 ng/mL 12/29/2017 09:46 CST Vit D 25 Hydroxy Reference Range:<br/>0 - 17 years - &nbsp; &nbsp; Deficiency: &nbsp; &nbsp; &nbsp; &nbsp; less than 20 ng/ml<br/> &nbsp; &nbsp; &nbsp; &nbsp; Optimum level: &nbsp; &gt; or = 20 ng/ml<br/>18 yrs or older: &nbsp;Deficiency: &nbsp; &nbsp; &nbsp; &nbsp; less than 20 ng/ml<br/> &nbsp; &nbsp; &nbsp; &nbsp; &nbsp; &nbsp; &nbsp; &nbsp; &nbsp; &nbsp; &nbsp; &nbsp; &nbsp;Insufficiency: &nbsp; &nbsp; 20 - -29  ng/ml<br/> &nbsp; &nbsp; &nbsp; &nbsp; &nbsp; &nbsp; &nbsp; &nbsp; &nbsp; &nbsp; &nbsp; &nbsp; &nbsp;Optimum level: &nbsp;30 - 80 ng/ml<br/> &nbsp; &nbsp; &nbsp; &nbsp; &nbsp; &nbsp; &nbsp; &nbsp; &nbsp; &nbsp; &nbsp; &nbsp; &nbsp;Possible toxicity: &gt; or = 150 ng/ml   Chol 151 mg/dL 12/29/2017 09:46 CST    HDL 32 mg/dL (Low) 12/29/2017 09:46 CST    Trig 174 mg/dL (High) 12/29/2017 09:46 CST    LDL 84 mg/dL 12/29/2017 09:46 CST    Chol/HDL 4.7 12/29/2017 09:46 CST    TSH 0.443 mIU/L 12/29/2017 09:46 CST    WBC 4.5 x10(3)/mcL 12/29/2017 09:46 CST    Hgb 15.3 gm/dL 12/29/2017 09:46 CST    Hct 43.8 % 12/29/2017 09:46 CST    Platelet 167 x10(3)/mcL 12/29/2017 09:46 CST    CD4 Pct 48.9 % 12/29/2017 09:46 CST    CD4 Absolute 967 unit/L 12/29/2017 09:46 CST    Syphilis Ab Nonreactive 12/29/2017 09:46 CST    Hep C Ab Nonreactive 12/29/2017 09:46 CST    HIV1 RNA PCR-LC <20 cpy/mL 12/29/2017 09:46 CST HIV-1 RNA not detected<br/> <br/>The reportable range for this assay is 20 to 10,000,000<br/>copies HIV-1 RNA/mL.   Neg Cont Spot count 0 12/29/2017 09:46 CST       [1]?Office Visit Note; Cindy Maddox 12/29/2017 09:16 CST

## 2022-05-02 NOTE — HISTORICAL [facility] CERNER
This is a historical note converted from [employer]. Formatting and pictures may have been removed.  Please reference C[facility]an for original formatting and attached multimedia. Chief Complaint  b20 f/u  History of Present Illness  Rafi is a 39 yo BM presenting today for HIV f/u visit.? He reports 100% adherent to Descovy & Tivicay, tolerates well with viral suppression. Mood is stable on Paxil.? He is sleeping well with amitriptyline. He tells me that he is not receiving vitamin d from [facility], will send prescription.? He is still having daily headaches, did not receive Fioricet from [facility].? He requests written prescription to fill locally.? He also tells me that he thought he had COVID last month due to loss of taste & smell.? He requests ab testing.? Fundus photo done 1/24/21, WNL. He requests to defer anal pap to next visit.  ?  10/30/20  Rafi is a 39 yo BM presenting today for HIV f/u visit.? He reports 100% adherent to Descovy & Tivicay, tolerates well with viral suppression.? He is taking paxil daily as prescribed & mood is much improved.? He uses amitriptyline prn sleep, effective.? He tells me that he [place] app[place] [facility] & received new eyeglasses.? He reports having daily?migraine headaches 9/10 nearly daily x 2 months.? He reports minimal response to ibuprofen 800mg & Lortab 7.5mg.? He denies aura, no n/v.? No visual changes, or changes in muscle strength/mobility.? He is amenable to Flu & Gardasil 9 #3 vaccination today.  ?   8/12/20  Rafi is a 39 yo HIV + BM evaluated by audio-only (pt could not figure out how to connect on casi) telemedicine due to COVID-19 pandemic.? He is located at home, and I, the provider, am located at an approved non-[facility] location.? We are both located in [place], the state in which I am licensed to practice.? The patient consents to telemedicine visit and is the only individual online.??The patient (or patients representative) stated that they  understood and accepted the privacy and security risks to their information at their location.  He reports 100% adherent to Descovy & Tivicay for a couple of months now after a 1 month interruption s/t lapse in insurance coverage.? He states that he lost Medicaid coverage & now has [facility] for medications.? He reapplied for Medicaid 8/11/20, as he is no longer working & is awaiting response.? He states that he was not able to have labs collected due to no insurance & no $.? He agrees to return for labs once Medicaid is renewed.? He states that he is not taking amitriptyline & uses paroxetine prn despite education to take daily.? He is aware that medications will be refilled after labs collected, as last labs were?9 months ago.??November 2019 VL <20, CD4 1160.  Review of Systems  ?  ?  Constitutional: negative except as stated in HPI  Eye: negative except as stated in HPI  ENMT: negative except as stated in HPI  Respiratory: negative except as stated in HPI  Cardiovascular: negative except as stated in HPI  Gastrointestinal: negative except as stated in HPI  Genitourinary: negative except as stated in HPI  Hema/Lymph: negative except as stated in HPI  Endocrine: negative except as stated in HPI  Immunologic: negative except as stated in HPI  Musculoskeletal: negative except as stated in HPI  Integumentary: negative except as stated in HPI  Neurologic: negative except as stated in HPI  ?   All Other ROS_ ?negative except as stated in HPI  Physical Exam  Vitals & Measurements  T:?36.8? ?C (Oral)? HR:?76(Peripheral)? RR:?14? BP:?123/73?  HT:?189.00?cm? WT:?98.800?kg? BMI:?27.66?  ?  ?  General: AAO X 4, afebrile  Eye: no icterus  HENT: oropharynx clear  Neck: supple  Respiratory: BBS CTA, non-labored, symmetrical  Cardiovascular: S1S2, RRR  Gastrointestinal BS + 4 quadrants, NTND, soft, no organomegaly  Genitourinary: non-tender  Lymphatics: no lymphadenopathy  Musculoskeletal: MAEW, steady gait  Integumentary:  WDI  Neurologic: CN II-XII intact  Assessment/Plan  1.?HIV (human immunodeficiency virus infection)(  Confirmed  )?B20  adherence/sexual health counseling done  cont Descovy & Tivicay daily  labs today  rtc?4 months w Cindy face to face  anal pap next visit  Ordered:  dolutegravir, 50 mg = 1 tab(s), Oral, Daily, # 30 tab(s), 3 Refill(s), Pharmacy: [facility], 189, cm, Height/Length Dosing, 03/01/21 10:52:00 CST, 98.8, kg, Weight Dosing, 03/01/21 10:52:00 CST  emtricitabine-tenofovir, 1 tab(s), Oral, Daily, # 30 tab(s), 3 Refill(s), Pharmacy: [facility], 189, cm, Height/Length Dosing, 03/01/21 10:52:00 CST, 98.8, kg, Weight Dosing, 03/01/21 10:52:00 CST  1160F- Medication reconciliation completed during visit, HIV (human immunodeficiency virus infection)(  Confirmed  )  Insomnia(  Confirmed  )  Depression with anxiety  Vitamin D deficiency  Migraines, [facility], 03/01/21 11:15:00 CST  CBC w/ Auto Diff, Routine collect, 03/01/21 11:22:00 CST, Blood, Stop date 03/01/21 11:22:00 CST, Lab Collect, HIV (human immunodeficiency virus infection)(  Confirmed  ), 03/01/21 11:22:00 CST  Cd4 Lymphocytes., Routine collect, 03/01/21 11:22:00 CST, Blood, Stop date 03/01/21 11:22:00 CST, Lab Collect, HIV (human immunodeficiency virus infection)(  Confirmed  ), 03/01/21 11:22:00 CST  Clinic Follow up, *Est. 07/01/21 8:50:00 CDT, Order for future visit, HIV (human immunodeficiency virus infection)(  Confirmed  ), [facility]  Comprehensive Metabolic Panel, Routine collect, 03/01/21 11:22:00 CST, Blood, Stop date 03/01/21 11:22:00 CST, Lab Collect, HIV (human immunodeficiency virus infection)(  Confirmed  ), 03/01/21 11:22:00 CST  Office/Outpatient Visit Level 4 Established 60984 PC, HIV (human immunodeficiency virus infection)(  Confirmed  )  Insomnia(  Confirmed  )  Depression with anxiety  Vitamin D deficiency  Migraines, [facility], 03/01/21 11:15:00 CST  RNA,  PCR(NonGraph)rfx/GenoPRIme(R)-[facility] [id number], Routine collect, 03/01/21 11:22:00 CST, Blood, Stop date 03/01/21 11:22:00 CST, Lab Collect, HIV (human immunodeficiency virus infection)(  Confirmed  ), 03/01/21 11:22:00 CST  SARS CoV2 IgG Ab, Routine collect, 03/01/21 11:22:00 CST, Blood, Stop date 03/01/21 11:22:00 CST, Lab Collect, HIV (human immunodeficiency virus infection)(  Confirmed  ), 03/01/21 11:22:00 CST  ?  2.?Insomnia(  Confirmed  )?G47.00  ??controlled  continue amitriptyline  Ordered:  amitriptyline, 50 mg = 1 tab(s), Oral, Once a day (at bedtime), # 30 tab(s), 3 Refill(s), Pharmacy: [facility], 189, cm, Height/Length Dosing, 03/01/21 10:52:00 CST, 98.8, kg, Weight Dosing, 03/01/21 10:52:00 CST  1160F- Medication reconciliation completed during visit, HIV (human immunodeficiency virus infection)(  Confirmed  )  Insomnia(  Confirmed  )  Depression with anxiety  Vitamin D deficiency  Migraines, [facility], 03/01/21 11:15:00 CST  Office/Outpatient Visit Level 4 Established 42543 , HIV (human immunodeficiency virus infection)(  Confirmed  )  Insomnia(  Confirmed  )  Depression with anxiety  Vitamin D deficiency  Migraines, [facility], 03/01/21 11:15:00 CST  ?  3.?Depression with anxiety?F41.8  ?controlled  continue paxil  Ordered:  PARoxetine, 20 mg = 1 tab(s), Oral, Daily, # 30 tab(s), 3 Refill(s), Pharmacy: [facility], 189, cm, Height/Length Dosing, 03/01/21 10:52:00 CST, 98.8, kg, Weight Dosing, 03/01/21 10:52:00 CST  1160F- Medication reconciliation completed during visit, HIV (human immunodeficiency virus infection)(  Confirmed  )  Insomnia(  Confirmed  )  Depression with anxiety  Vitamin D deficiency  Migraines, [facility], 03/01/21 11:15:00 CST  Office/Outpatient Visit Level 4 Established 79702 PC, HIV (human immunodeficiency virus infection)(  Confirmed  )  Insomnia(  Confirmed  )  Depression with anxiety  Vitamin D deficiency  Migraines,  [facility], 03/01/21 11:15:00 CST  ?  4.?Vitamin D deficiency?E55.9  ?vitamin d2 weekly as prescribed  Ordered:  ergocalciferol, 50,000 IntUnit = 1 cap(s), Oral, QOWK, # 5 cap(s), 3 Refill(s), Pharmacy: [facility], 189, cm, Height/Length Dosing, 03/01/21 10:52:00 CST, 98.8, kg, Weight Dosing, 03/01/21 10:52:00 CST  1160F- Medication reconciliation completed during visit, HIV (human immunodeficiency virus infection)(  Confirmed  )  Insomnia(  Confirmed  )  Depression with anxiety  Vitamin D deficiency  Migraines, [facility], 03/01/21 11:15:00 CST  Office/Outpatient Visit Level 4 Established 01681 , HIV (human immunodeficiency virus infection)(  Confirmed  )  Insomnia(  Confirmed  )  Depression with anxiety  Vitamin D deficiency  Migraines, [facility], 03/01/21 11:15:00 CST  ?  5.?Migraines?G43.909  ?Fioricet as prescribed  Ordered:  acetaminophen/butalbital/caffeine, 2 cap(s), Oral, q4hr, PRN PRN as needed, not to exceed 6 capsules/day, # 30 cap(s), 1 Refill(s)  1160F- Medication reconciliation completed during visit, HIV (human immunodeficiency virus infection)(  Confirmed  )  Insomnia(  Confirmed  )  Depression with anxiety  Vitamin D deficiency  Migraines, [facility], 03/01/21 11:15:00 CST  Office/Outpatient Visit Level 4 Established 57443 , HIV (human immunodeficiency virus infection)(  Confirmed  )  Insomnia(  Confirmed  )  Depression with anxiety  Vitamin D deficiency  Migraines, [facility], 03/01/21 11:15:00 CST  ?  Referrals  Clinic Follow up, *Est. 07/01/21 8:50:00 CDT, Order for future visit, HIV (human immunodeficiency virus infection)(  Confirmed  ), [facility]   Problem List/Past Medical History  Ongoing  HIV (human immunodeficiency virus infection)(  Confirmed  )  Insomnia(  Confirmed  )  Historical  Depressive disorder(  Confirmed  )  HIV  Vitamin D deficiency(  Confirmed  )  Procedure/Surgical History  Hernia Repair Ventral (None)  (07/29/2015)  Other open umbilical herniorrhaphy (07/29/2015)  Repair umbilical hernia, age 5 years or older; reducible (07/29/2015)  No   Medications  acyclovir 400 mg oral tablet, 400 mg= 1 tab(s), Oral, BID, PRN, 3 refills  amitriptyline 50 mg oral tablet, 50 mg= 1 tab(s), Oral, Once a day (at bedtime), 3 refills  Descovy 200 mg-25 mg oral tablet, 1 tab(s), Oral, Daily, 3 refills  ergocalciferol 50,000 intUnit oral capsule, 80965 IntUnit= 1 cap(s), Oral, QOWK, 3 refills  Fioricet oral capsule, 2 cap(s), Oral, q4hr, PRN, 1 refills  paroxetine 20 mg oral tablet, 20 mg= 1 tab(s), Oral, Daily, 3 refills  Tivicay 50 mg oral tablet, 50 mg= 1 tab(s), Oral, Daily, 3 refills  Allergies  No Known Medication Allergies  Social History  Abuse/Neglect  No, No, Yes, 03/01/2021  Alcohol  Current, 07/13/2015  Employment/School  Employed, 09/22/2017  Exercise  Home/Environment  Lives with Alone., 09/22/2017  Nutrition/Health  Regular, 09/22/2017  Sexual  Sexually active: Yes., 09/22/2017  Substance Use  Never, 09/22/2017  Tobacco  Never (less than 100 in lifetime), No, 03/01/2021  Family History  Hypertension.: Mother.  Tobacco use.: Mother.  Immunizations  Vaccine Date Status   influenza virus vaccine, inactivated 10/30/2020 Given   human papillomavirus vaccine 10/30/2020 Given   human papillomavirus vaccine 11/04/2019 Given   influenza virus vaccine, inactivated 11/04/2019 Given   pneumococcal 23-polyvalent vaccine 03/22/2019 Given   human papillomavirus vaccine 03/22/2019 Given   meningococcal conjugate vaccine 12/03/2018 Given   influenza virus vaccine, inactivated 12/03/2018 Given   meningococcal conjugate vaccine 07/31/2018 Given   influenza virus vaccine, inactivated 12/29/2017 Given   influenza virus vaccine, inactivated 10/18/2016 Given   influenza virus vaccine, inactivated 03/17/2016 Given   tetanus/diphtheria/pertussis, acel(Tdap) 07/13/2015 Given   pneumococcal 13-valent conjugate vaccine 01/08/2015 Recorded    influenza virus vaccine, inactivated 01/08/2015 Recorded   pneumococcal 23-polyvalent vaccine 01/21/2014 Recorded   influenza virus vaccine, inactivated 01/21/2014 Recorded   influenza virus vaccine, inactivated 09/30/2011 Recorded   influenza virus vaccine, inactivated 10/26/2010 Recorded   influenza virus vaccine, inactivated 10/09/2009 Recorded   hepatitis A-hepatitis B vaccine 12/01/2008 Recorded   pneumococcal 7-valent vaccine 09/17/2008 Recorded   Health Maintenance  Health Maintenance  ???Pending?(in the next year)  ??? ??Due?  ??? ? ? ?Alcohol Misuse Screening due??01/02/21??and every 1??year(s)  ??? ??Due In Future?  ??? ? ? ?Influenza Vaccine not due until??10/01/21??and every 1??day(s)  ??? ? ? ?Obesity Screening not due until??01/01/22??and every 1??year(s)  ???Satisfied?(in the past 1 year)  ??? ??Satisfied?  ??? ? ? ?ADL Screening on??03/01/21.??Satisfied by Jean Claude Clemens  ??? ? ? ?Blood Pressure Screening on??03/01/21.??Satisfied by Jean Claude Clemens  ??? ? ? ?Body Mass Index Check on??03/01/21.??Satisfied by Jean Claude Clemens  ??? ? ? ?Depression Screening on??03/01/21.??Satisfied by Jean Claude Clemens  ??? ? ? ?Diabetes Screening on??10/16/20.??Satisfied by Shahana Patel  ??? ? ? ?Influenza Vaccine on??10/30/20.??Satisfied by Nataliya Weston  ??? ? ? ?Lipid Screening on??10/16/20.??Satisfied by Shahana Patel  ??? ? ? ?Obesity Screening on??03/01/21.??Satisfied by Jean Claude Clemens  ?  anal pap 12/29/17 NIL, 11/19 QNS  anal ct/gc 3/17/16 neg, 12/29/17 neg, 11/19 neg  oral ct/gc 3/17/16 neg, 12/29/17 neg, 11/19 neg  urine ct/gc 3/17/16 neg, 12/29/17 neg, 11/19 neg  ophth? 7/2017, 8/2020 Dr. Estrada , 1/24/21  Lab Results  Test Name Test Result Date/Time Comments   Creatinine 1.30 mg/dL 10/16/2020 13:38 CDT    eGFR-AA 78 mL/min (Low) 10/16/2020 13:38 CDT    AST 21 unit/L 10/16/2020 13:38 CDT    ALT 36 unit/L 10/16/2020 13:38 CDT    Hgb A1c 6.0 % 10/16/2020 13:38 CDT    Vit D 25 OH 12.9 ng/mL (Low) 10/16/2020 13:38 CDT     Chol 175 mg/dL 10/16/2020 13:38 CDT    HDL 31 mg/dL (Low) 10/16/2020 13:38 CDT    Trig 137 mg/dL 10/16/2020 13:38 CDT     mg/dL 10/16/2020 13:38 CDT    Chol/HDL 5.6 (High) 10/16/2020 13:38 CDT    TSH 0.652 mIU/L 10/16/2020 13:38 CDT    WBC 4.4 x10(3)/mcL (Low) 10/16/2020 13:38 CDT    Hgb 15.2 gm/dL 10/16/2020 13:38 CDT    Hct 45.9 % 10/16/2020 13:38 CDT    Platelet 142 x10(3)/mcL 10/16/2020 13:38 CDT    CD4 Pct 47.5 % 10/16/2020 13:38 CDT    CD4 Absolute 982 unit/L 10/16/2020 13:38 CDT    Syphilis Ab Nonreactive 10/16/2020 13:38 CDT    Hep C Ab Nonreactive 10/16/2020 13:38 CDT Interpretive Information (HCV):  ? ? ? ? ? ? ? ?Nonreactive: ? ?Antibodies to HCV not detected.  ? ? ? ? ? ? ? ?Reactive: ? ? ? ?A reactive result should be followed by nucleic acid testing for  ? ? ? ? ? ? ? ? ?HCV RNA for identifying current hepatitis C virus(HCV) infection.  ? ? ? ? ? ? ? ?Equivocal: ? ? ?Another specimen should be obtained from patient for further testing.   Chlamydia trach-LC Negative 10/16/2020 13:10 CDT    N gonorrhoeae JUNI-LC Negative 10/16/2020 13:10 CDT Performed At: [facility] [place]  [address] [place], [place] [id number]  Charlene Ruiz MD Ph:[phone]   HIV1 RNA PCR-LC <20 cpy/mL 10/16/2020 13:38 CDT HIV-1 RNA detected  ?  The reportable range for this assay is 20 to 10,000,000  copies HIV-1 RNA/mL.   T-Spot.TB NEG, see scan 10/16/2020 13:38 CDT Review results in Power chart, Notes by date of service (fin#), Laboratory Docs, Outside lab., Limitations (from T-SPOT.TB package insert, p.15)  Results from T-SPOT.TB testing must be used in conjunction with each individuals epidemiological history, current medical status and results of other diagnostic evaluation.  ?  The performance of T-SPOT.TB has not been adequately evaluated with specimens from individuals younger than 17 yrs, in pregnant women, and in patients with hemophilia.  ?  A false positive result was obtained for T-SPOT.TB when  tested with M.xenopi, M.kansasii and M.gordonae. ?While ESAT-6 and CFP10 antigens are absent from BCG strains of M.bovis and from most enviromental mycobacteria, it is possible that a positive T-SPOT.TB result may be due to infection with M.kansaii, M.szulgai, M.gordonae or M.marinum. ?Alternative tests would be required if these infections are suspected.  ?  A negative test result does not exclude the possibility of exposure to, or infection with M.tuberculosis. ?Patients with recet exposure to TB infected individuals exhibiting a negative T-SPOT.MB result should be considered for retesting within 6 wks or if other relevant clinical symptoms indicate possible infection.  ?  A positive test result does not rule in active TB disease; other tests should be performed to confirm the diagnosis of active TB disease such as sputum smear and culture, PCR, and chest radiograph.  T-SPOT.TB has not been evaluated in subjects who have received > 1 month of anti-TB therapy.  Refrigerated and frozen samples are not recommended for use with T-SPOT.TB test.      [1] Office Visit Note; Cindy Moreno 10/30/2020 11:03 CDT

## 2022-05-02 NOTE — HISTORICAL [facility] CERNER
This is a historical note converted from [employer]ia. Formatting and pictures may have been removed.  Please reference [employer]ia for original formatting and attached multimedia. Chief Complaint  B20 F/U  History of Present Illness  Rafi is a 39 yo BM presenting today for HIV f/u visit.? Missed approximately 2 wks of Descovy & Tivicay around [place] time s/t incarceration.? Otherwise, 100% adherent to ART.? Did not schedule EMG & MRI, tremors resolved.? Paxil effective, does not think he received the hydroxyzine.? Did not take the Elavil, not sure if he received that either.? Amenable to recommended vaccinations today, HPV & PPSV 23.? Requests to defer oral & anal swabs to next visit.? Needs rf on vit d, recently resumed.? Also request refill on acyclovir, effective prn HSV outbreak.  ?  12/3/18  Rafi is a 39 yo BM presenting today for HIV f/u visit.? Reports 100% adherent to Descovy & Tivicay, tolerates well with viral suppression.? States that trazodone is not working for sleep, ambien did not work either.? Increased stressors since last visit, found out that girlfriend was cheating.? In the process of moving back to [place], [place]. Has resumed paxil 20mg/day, sometimes taking 2 tabs.? C/o tremor to right hand/wrist x 2 months, constant (at rest & with activity).? States occurred over a year ago intermittently, but is not constant.? No headaches or visual disturbance.? No weight loss or appetite changes.? Amenable to recommended vaccinations today.? Recent HSV outbreak, states valtrex was not effective & would like rf of acyclovir to have on hand.  ?   7/31/18  Rafi is a 39 yo BM presenting today for HIV f/u visit.??Reports 98%% adherent to Descovy & Tivicay, tolerates well with viral suppression.? Missed 1-2 doses per month.? Trazodone no longer effective at 50mg dose, would like to try higher dose, no hangover effects noted.? Mood stable.? Diet high in sugar, will reduce sugars in diet, specifically soda.? Taking vit d2  1-2 times per month, levels stable.? Left foot wound completely resolved with antibiotics rx last visit.? Will see private opth for routine eye exam.? Amenable to recommended vaccination today.  ?  Review of Systems  ?  ?  Constitutional: negative except as stated in HPI  Eye: negative except as stated in HPI  ENMT: negative except as stated in HPI  Respiratory: negative except as stated in HPI  Cardiovascular: negative except as stated in HPI  Gastrointestinal: negative except as stated in HPI  Genitourinary: negative except as stated in HPI  Hema/Lymph: negative except as stated in HPI  Endocrine: negative except as stated in HPI  Immunologic: negative except as stated in HPI  Musculoskeletal: negative except as stated in HPI  Integumentary: negative except as stated in HPI  Neurologic: negative except as stated in HPI  ?   All Other ROS_ ?negative except as stated in HPI  Physical Exam  Vitals & Measurements  T:?36.9? ?C (Oral)? HR:?70(Peripheral)? BP:?122/82?  HT:?189?cm? WT:?95?kg? BMI:?26.6?  ?  ?  General: AAO X 4, afebrile  Eye: no icterus  HENT: oropharynx clear  Neck: supple  Respiratory: BBS CTA, non-labored, symmetrical  Cardiovascular: S1S2, RRR  Gastrointestinal BS + 4 quadrants, NTND, soft, no organomegaly  Genitourinary: non-tender  Lymphatics: no lymphadenopathy  Musculoskeletal: MAEW, steady gait  Integumentary: WDI  Neurologic: CN II-XII intact  Assessment/Plan  1.?HIV (human immunodeficiency virus infection)(  Confirmed  )?B20  ? ? adherence/sexual health counseling done  cont Descovy & Tivicay daily  labs today  rtc 3 mos? w Cindy  Ordered:  dolutegravir, 50 mg = 1 tab(s), Oral, Daily, # 30 tab(s), 4 Refill(s), Pharmacy: [facility]  emtricitabine-tenofovir, 1 tab(s), Oral, Daily, # 30 tab(s), 4 Refill(s), [facility]  1160F- Medication reconciliation completed during visit, HIV (human immunodeficiency virus infection)(  Confirmed  )  Vitamin D deficiency(   Confirmed  )  Dyslipidemia  Need for vaccination, [facility], 03/22/19 14:21:00 CDT  CBC w/ Auto Diff, Routine collect, 03/22/19 14:21:00 CDT, Blood, Order for future visit, Stop date 03/22/19 14:21:00 CDT, Lab Collect, HIV (human immunodeficiency virus infection)(  Confirmed  ), 03/22/19 14:21:00 CDT  Chlamydia trach and N. gonorrhea PCR, Routine collect, Urine, Order for future visit, 03/22/19 14:21:00 CDT, Stop date 03/22/19 14:21:00 CDT, Nurse collect, HIV (human immunodeficiency virus infection)(  Confirmed  )  Clinic Follow up, *Est. 06/22/19 3:00:00 CDT, Order for future visit, HIV (human immunodeficiency virus infection)(  Confirmed  ), [facility]  Comprehensive Metabolic Panel, Routine collect, 03/22/19 14:21:00 CDT, Blood, Order for future visit, Stop date 03/22/19 14:21:00 CDT, Lab Collect, HIV (human immunodeficiency virus infection)(  Confirmed  ), 03/22/19 14:21:00 CDT  Office/Outpatient Visit Level 4 Established 21807 PC, HIV (human immunodeficiency virus infection)(  Confirmed  )  Vitamin D deficiency(  Confirmed  )  Dyslipidemia  Need for vaccination, [facility], 03/22/19 14:21:00 CDT  RNA, PCR(NonGraph)rfx/GenoPRIme(R)-[facility] [id number], Routine collect, 03/22/19 14:21:00 CDT, Blood, Order for future visit, Stop date 03/22/19 14:21:00 CDT, Lab Collect, HIV (human immunodeficiency virus infection)(  Confirmed  ), 03/22/19 14:21:00 CDT  ?  2.?Vitamin D deficiency(  Confirmed  )?E55.9  ?cont vit d2 as rxd  Ordered:  1160F- Medication reconciliation completed during visit, HIV (human immunodeficiency virus infection)(  Confirmed  )  Vitamin D deficiency(  Confirmed  )  Dyslipidemia  Need for vaccination, [facility], 03/22/19 14:21:00 CDT  Office/Outpatient Visit Level 4 Established 23640 PC, HIV (human immunodeficiency virus infection)(  Confirmed  )  Vitamin D deficiency(  Confirmed  )  Dyslipidemia  Need for vaccination, [facility], 03/22/19 14:21:00  CDT  ?  3.?Dyslipidemia?E78.5  increase?omega 3 in diet,?or fish oil supplement otc  Ordered:  1160F- Medication reconciliation completed during visit, HIV (human immunodeficiency virus infection)(  Confirmed  )  Vitamin D deficiency(  Confirmed  )  Dyslipidemia  Need for vaccination, [facility], 03/22/19 14:21:00 CDT  Office/Outpatient Visit Level 4 Established 10359 , HIV (human immunodeficiency virus infection)(  Confirmed  )  Vitamin D deficiency(  Confirmed  )  Dyslipidemia  Need for vaccination, [facility], 03/22/19 14:21:00 CDT  ?  4.?Need for vaccination?Z23  ? HPV #1 & PPSV?23 today  Ordered:  human papillomavirus vaccine, 0.5 mL, form: Susp, IM, Once-Unscheduled, first dose 03/22/19 14:21:00 CDT  pneumococcal 23-polyvalent vaccine, 0.5 mL, form: Injection, IM, Once-Unscheduled, first dose 03/22/19 14:21:00 CDT, Waste Code BK  1160F- Medication reconciliation completed during visit, HIV (human immunodeficiency virus infection)(  Confirmed  )  Vitamin D deficiency(  Confirmed  )  Dyslipidemia  Need for vaccination, [facility], 03/22/19 14:21:00 CDT  Office/Outpatient Visit Level 4 Established 00677 , HIV (human immunodeficiency virus infection)(  Confirmed  )  Vitamin D deficiency(  Confirmed  )  Dyslipidemia  Need for vaccination, [facility], 03/22/19 14:21:00 CDT  ?  Orders:  acyclovir, 400 mg = 1 tab(s), Oral, 5x/Day, PRN PRN other (see comment), as needed for outbreaks, # 35 tab(s), 2 Refill(s), Pharmacy: [facility]  amitriptyline, 25 mg = 1 tab(s), Oral, Once a day (at bedtime), # 30 tab(s), 3 Refill(s), Pharmacy: [facility]  hydrOXYzine, 25 mg = 1 tab(s), Oral, QID, PRN PRN for anxiety, # 40 tab(s), 2 Refill(s), Pharmacy: [facility]  PARoxetine, 20 mg = 1 tab(s), Oral, Daily, # 30 tab(s), 3 Refill(s), Pharmacy: [facility]   Problem List/Past Medical History  Ongoing  HIV (human immunodeficiency virus infection)(  Confirmed   )  Insomnia(  Confirmed  )  Vitamin D deficiency(  Confirmed  )  Historical  Depressive disorder(  Confirmed  )  HIV  Procedure/Surgical History  Hernia Repair Ventral (None) (07/29/2015)  No   Medications  acyclovir 400 mg oral tablet, 400 mg= 1 tab(s), Oral, 5x/Day, PRN, 2 refills  amitriptyline 25 mg oral tablet, 25 mg= 1 tab(s), Oral, Once a day (at bedtime), 3 refills  Descovy 200 mg-25 mg oral tablet, 1 tab(s), Oral, Daily, 4 refills  ergocalciferol 50,000 intUnit oral capsule, 12753 IntUnit= 1 cap(s), Oral, QOWK, 4 refills  Gardasil 9 intramuscular suspension, 0.5 mL, IM, Once-Unscheduled  hydrOXYzine hydrochloride 25 mg oral tablet, 25 mg= 1 tab(s), Oral, QID, PRN, 2 refills  paroxetine 20 mg oral tablet, 20 mg= 1 tab(s), Oral, Daily, 3 refills  Pneumovax 23, 0.5 mL, IM, Once-Unscheduled  Tivicay 50 mg oral tablet, 50 mg= 1 tab(s), Oral, Daily, 4 refills  Allergies  No Known Medication Allergies  Social History  Alcohol  Current, 07/13/2015  Employment/School  Employed, 09/22/2017  Exercise  Home/Environment  Lives with Alone., 09/22/2017  Nutrition/Health  Regular, 09/22/2017  Sexual  Sexually active: Yes., 09/22/2017  Substance Abuse  Never, 09/22/2017  Tobacco  Never (less than 100 in lifetime), N/A, 03/22/2019  Family History  Hypertension.: Mother.  Tobacco use.: Mother.  Immunizations  Vaccine Date Status   meningococcal conjugate vaccine 12/03/2018 Given   influenza virus vaccine, inactivated 12/03/2018 Given   meningococcal conjugate vaccine 07/31/2018 Given   influenza virus vaccine, inactivated 12/29/2017 Given   influenza virus vaccine, inactivated 10/18/2016 Given   influenza virus vaccine, inactivated 03/17/2016 Given   tetanus/diphtheria/pertussis, acel(Tdap) 07/13/2015 Given   pneumococcal 13-valent conjugate vaccine 01/08/2015 Recorded   influenza virus vaccine, inactivated 01/08/2015 Recorded   pneumococcal 23-polyvalent vaccine 01/21/2014 Recorded   influenza virus vaccine,  inactivated 01/21/2014 Recorded   hepatitis A-hepatitis B vaccine 12/01/2008 Recorded   Health Maintenance  Health Maintenance  ???Pending?(in the next year)  ??? ??OverDue  ??? ? ? ?Diabetes Screening due??and every?  ??? ??Due?  ??? ? ? ?Alcohol Misuse Screening due??03/22/19??and every 1??year(s)  ??? ??Due In Future?  ??? ? ? ?Blood Pressure Screening not due until??03/21/20??and every 1??year(s)  ??? ? ? ?Body Mass Index Check not due until??03/21/20??and every 1??year(s)  ???Satisfied?(in the past 1 year)  ??? ??Satisfied?  ??? ? ? ?ADL Screening on??03/22/19.??Satisfied by Mirza Woodruff LPN  ??? ? ? ?Blood Pressure Screening on??03/22/19.??Satisfied by Mirza Woodruff LPN  ??? ? ? ?Body Mass Index Check on??03/22/19.??Satisfied by Mirza Woodruff LPN  ??? ? ? ?Depression Screening on??12/03/18.??Satisfied by Gillian Nelson LPN  ??? ? ? ?Diabetes Screening on??12/03/18.??Satisfied by Marcella Kirk  ??? ? ? ?Influenza Vaccine on??12/03/18.??Satisfied by Gillian Nelson LPN  ??? ? ? ?Lipid Screening on??12/03/18.??Satisfied by Marcella Kirk  ??? ? ? ?Obesity Screening on??03/22/19.??Satisfied by Mirza Woodruff LPN  ??? ? ? ?Smoking Cessation (Coronary Artery Disease) on??07/31/18.??Satisfied by Mone Unger LPN  ?  ?  Lab Results  Test Name Test Result Date/Time Comments   Creatinine 1.20 mg/dL 12/03/2018 12:30 CST    AST 13 unit/L (Low) 12/03/2018 12:30 CST    ALT 26 unit/L 12/03/2018 12:30 CST    Hgb A1c 5.6 % 12/03/2018 12:30 CST    Vit D 25 OH 18.83 ng/mL (Low) 12/03/2018 12:30 CST Vit D 25 Hydroxy Reference Range:  0 - 17 years - ? ? Deficiency: ? ? ? ? less than 20 ng/ml  ? ? ? ? Optimum level: ? > or = 20 ng/ml  18 yrs or older: ?Deficiency: ? ? ? ? less than 20 ng/ml  ? ? ? ? ? ? ? ? ? ? ? ? ?Insufficiency: ? ? 20 - -29 ng/ml  ? ? ? ? ? ? ? ? ? ? ? ? ?Optimum level: ?30 - 80 ng/ml  ? ? ? ? ? ? ? ? ? ? ? ? ?Possible toxicity: > or = 150 ng/ml   Chol 157 mg/dL 12/03/2018 12:30 CST    HDL 29 mg/dL  (Low) 12/03/2018 12:30 CST    Trig 123 mg/dL 12/03/2018 12:30 CST     mg/dL 12/03/2018 12:30 CST    Chol/HDL 5.4 (High) 12/03/2018 12:30 CST    TSH 0.398 mIU/L 12/03/2018 12:30 CST    WBC 5.4 x10(3)/mcL 12/03/2018 12:30 CST    Hgb 15.6 gm/dL 12/03/2018 12:30 CST    Hct 45.9 % 12/03/2018 12:30 CST    Platelet 172 x10(3)/mcL 12/03/2018 12:30 CST    CD4 Pct 45.7 % 12/03/2018 12:30 CST    CD4 Absolute 1259 unit/L 12/03/2018 12:30 CST    Syphilis Ab Nonreactive 12/03/2018 12:30 CST    Hep C Ab Nonreactive 12/03/2018 12:30 CST    HIV1 RNA PCR-LC <20 cpy/mL 12/03/2018 12:30 CST HIV-1 RNA not detected  ?  The reportable range for this assay is 20 to 10,000,000  copies HIV-1 RNA/mL.   Panel A Spot Count 0 12/03/2018 12:30 CST    Panel B Spot Count 0 12/03/2018 12:30 CST       [1]?Office Visit Note; Cindy Maddox 12/03/2018 12:06 CST

## 2022-05-02 NOTE — HISTORICAL [facility] CERNER
History Of Present Illness


53 y/o M p/w wound check. Here 2 days ago for abscess that was drained. Denies 

fever, pain. Not on antibiotics.


Chief Complaint (Nursing): Wound Check





Past Medical History


Vital Signs: 


 Last Vital Signs











Temp  97.8 F   08/23/17 17:47


 


Pulse  68   08/23/17 17:47


 


Resp  20   08/23/17 17:47


 


BP  117/77   08/23/17 17:47


 


Pulse Ox  97   08/24/17 07:40











Family History: States: No Known Family Hx





- Social History


Hx Alcohol Use: No


Hx Substance Use: No





- Immunization History


Hx Tetanus Toxoid Vaccination: Yes (2017)


Hx Influenza Vaccination: No


Hx Pneumococcal Vaccination: No





Review Of Systems


Except As Marked, All Systems Reviewed And Found Negative.


Constitutional: Negative for: Fever


Respiratory: Negative for: Shortness of Breath





Physical Exam





- Physical Exam


Additional Physical Exam Comments: 


Gen: NAD


Skin: Incision on R upper back, packing removed, no further drainage. Minimal 

erythema surrounding incision, minimally tender.





ED Course And Treatment


O2 Sat by Pulse Oximetry: 97





Medical Decision Making


Medical Decision Making: 


Patient states he will follow up with PMD in 2 more days for another wound 

check. Instructed to return to ED for fever or worsening pain or discharge.





Disposition





- Disposition


Disposition: HOME/ ROUTINE


Disposition Time: 18:40


Condition: STABLE


Instructions:  Abscess (ED)


Forms:  [facility] (English)





- Clinical Impression


Clinical Impression: 


 Visit for wound check This is a historical note converted from [facility]. Formatting and pictures may have been removed.  Please reference [facility] for original formatting and attached multimedia. Chief Complaint  b20 f/u  History of Present Illness  Rafi is a 42 yo AAM presenting today for HIV f/u visit. He reports 100% adherent to Descovy & Tivicay, tolerates well with viral suppression.? Last labs 7/1/21 VL <20, Cd4 1209.? He did not try the Biktarvy as discussed last visit, but is still amenable to revision for STR.? He tells me that his mother passed away from complications of COVID shortly after last visit, 9/2021.? His father is now hospitalized in [place], [place] with COVID as well.? He tells me that he will get his 1st dose of COVID vaccine before leaving to go visit with his father.? He appreciates flu vax in clinic today.? He continues to take Paxil intermittently despite education to not do so.? Amitriptyline effective for sleep, uses PRN & does not need refills at this time.? He is taking vitamin d as prescribed. Anal pap 7/1/21 NIL. Sildenafil effective at 40mg dose for ED.? Uses condoms, takes about 2 times per week.? Appreciates refills.  ?  7/1/21  Rafi is a 42 yo AAM presenting today for HIV F/u visit.? He reports 100% adherent to Descovy & Tivicay, tolerates well with viral suppression.? Last labs 3/1/21, VL 80, Cd4 1131.? Will update labs today.? He states that mood is stable on Paxil.? He uses amitriptyline PRN sleep.? He is amenable to routine anal?pap?today.??Denies any anal intercourse ever, nor?performing any oral intercourse since last STI screening.? He tells me that he?has been struggling with ED?for about the past year, really since starting Paxil.? He states that he doesnt?even really have morning erections.??Appreciates pharmaceutical?assistance, agrees to use condoms.? He will?be going [place]?with a group of friends in a couple of weeks, condoms provided.?  ?   3/1/21  Rafi is a 39 yo BM presenting today for HIV  f/u visit.? He reports 100% adherent to Descovy & Tivicay, tolerates well with viral suppression. Mood is stable on Paxil.? He is sleeping well with amitriptyline. He tells me that he is not receiving vitamin d from [facility], will send prescription.? He is still having daily headaches, did not receive Fioricet from [facility].? He requests written prescription to fill locally.? He also tells me that he thought he had COVID last month due to loss of taste & smell.? He requests ab testing.? Fundus photo done 1/24/21, WNL. He requests to defer anal pap to next visit.  Review of Systems  ?  ?  Constitutional: negative except as stated in HPI  Eye: negative except as stated in HPI  ENMT: negative except as stated in HPI  Respiratory: negative except as stated in HPI  Cardiovascular: negative except as stated in HPI  Gastrointestinal: negative except as stated in HPI  Genitourinary: negative except as stated in HPI  Hema/Lymph: negative except as stated in HPI  Endocrine: negative except as stated in HPI  Immunologic: negative except as stated in HPI  Musculoskeletal: negative except as stated in HPI  Integumentary: negative except as stated in HPI  Neurologic: negative except as stated in HPI  ?   All Other ROS_ ?negative except as stated in HPI  Physical Exam  Vitals & Measurements  T:?36.7? ?C (Oral)? HR:?69(Peripheral)? RR:?16? BP:?100/70?  HT:?189.00?cm? WT:?99.000?kg? BMI:?27.71?  ?  ?  General: AAO X 4, afebrile  Eye: no icterus  HENT: oropharynx clear  Neck: supple  Respiratory: BBS CTA, non-labored, symmetrical  Cardiovascular: S1S2, RRR  Gastrointestinal BS + 4 quadrants, NTND, soft, no organomegaly  Genitourinary: non-tender  Lymphatics: no lymphadenopathy  Musculoskeletal: MAEW, steady gait  Integumentary: WDI  Neurologic: CN II-XII intact  Assessment/Plan  1.?HIV (human immunodeficiency virus infection)(  Confirmed  )?B20  adherence/sexual health counseling done  cont Descovy & Tivicay daily  labs today  RTC?4  months with Cindy  Ordered:  bictegravir/emtricitabine/tenofovir, 1 tab(s), Oral, Daily, # 30 tab(s), 3 Refill(s), Pharmacy: [facility], 189, cm, Height/Length Dosing, 11/03/21 8:23:00 CDT, 99, kg, Weight Dosing, 11/03/21 8:23:00 CDT  1160F- Medication reconciliation completed during visit, HIV (human immunodeficiency virus infection)(  Confirmed  )  Depression  Insomnia(  Confirmed  )  Vitamin D deficiency  Erectile dysfunction, [facility], 11/03/21 8:49:00 CDT  Cd4 Lymphocytes., Routine collect, 11/03/21 8:53:00 CDT, Blood, Stop date 11/03/21 8:54:00 CDT, Lab Collect, HIV (human immunodeficiency virus infection)(  Confirmed  ), 11/03/21 8:53:00 CDT  Chlam trachom & N gonorrhoeae by JUNI-[facility] [id number], Routine collect, Urine, 11/03/21 9:49:00 CDT, Stop date 11/03/21 9:49:00 CDT, Nurse collect, HIV (human immunodeficiency virus infection)(  Confirmed  ), 11/03/21 9:49:00 CDT  Clinic Follow up, *Est. 03/04/22 8:10:00 CST, Order for future visit, HIV (human immunodeficiency virus infection)(  Confirmed  ), [facility]  Hepatitis C Antibody, Routine collect, 11/03/21 8:53:00 CDT, Blood, Stop date 11/03/21 8:54:00 CDT, Lab Collect, HIV (human immunodeficiency virus infection)(  Confirmed  ), 11/03/21 8:53:00 CDT  Office/Outpatient Visit Level 4 Established 30250 PC, HIV (human immunodeficiency virus infection)(  Confirmed  )  Depression  Insomnia(  Confirmed  )  Vitamin D deficiency  Erectile dysfunction, [facility], 11/03/21 8:49:00 CDT  RNA, PCR(NonGraph)rfx/GenoPRIme(R)-[facility] [id number], Routine collect, 11/03/21 8:53:00 CDT, Blood, Stop date 11/03/21 8:54:00 CDT, Lab Collect, HIV (human immunodeficiency virus infection)(  Confirmed  ), 11/03/21 8:53:00 CDT  Syphilis Antibody (with Reflex RPR), Routine collect, 11/03/21 8:53:00 CDT, Blood, Stop date 11/03/21 8:54:00 CDT, Lab Collect, HIV (human immunodeficiency virus infection)(  Confirmed  ), 11/03/21 8:53:00 CDT  T Spot  Test for M. tuberculosis, Routine collect, 11/03/21 8:53:00 CDT, Blood, Stop date 11/03/21 8:54:00 CDT, Lab Collect, HIV (human immunodeficiency virus infection)(  Confirmed  ), 11/03/21 8:53:00 CDT  T Spot Test for M. tuberculosis, Routine collect, 11/03/21 8:53:00 CDT, Blood, Stop date 11/03/21 8:54:00 CDT, Lab Collect, HIV (human immunodeficiency virus infection)(  Confirmed  ), 11/03/21 8:53:00 CDT  Urine Culture [id number], Routine collect, 11/03/21 9:49:00 CDT, Urine, Collected, Nurse collect, [id number], Stop date 11/03/21 9:49:00 CDT, HIV (human immunodeficiency virus infection)(  Confirmed  )  ?  2.?Insomnia(  Confirmed  )?G47.00  controlled  continue amitriptyline prn  Ordered:  1160F- Medication reconciliation completed during visit, HIV (human immunodeficiency virus infection)(  Confirmed  )  Depression  Insomnia(  Confirmed  )  Vitamin D deficiency  Erectile dysfunction, [facility], 11/03/21 8:49:00 CDT  Office/Outpatient Visit Level 4 Established 45927 PC, HIV (human immunodeficiency virus infection)(  Confirmed  )  Depression  Insomnia(  Confirmed  )  Vitamin D deficiency  Erectile dysfunction, [facility], 11/03/21 8:49:00 CDT  ?  3.?Depression?F32.9  controlled  continue paxil  Ordered:  1160F- Medication reconciliation completed during visit, HIV (human immunodeficiency virus infection)(  Confirmed  )  Depression  Insomnia(  Confirmed  )  Vitamin D deficiency  Erectile dysfunction, [facility], 11/03/21 8:49:00 CDT  Office/Outpatient Visit Level 4 Established 74929 PC, HIV (human immunodeficiency virus infection)(  Confirmed  )  Depression  Insomnia(  Confirmed  )  Vitamin D deficiency  Erectile dysfunction, [facility], 11/03/21 8:49:00 CDT  ?  4.?Vitamin D deficiency?E55.9  ?continue?vitamin d2 weekly as prescribed  Ordered:  ergocalciferol, 50,000 IntUnit = 1 cap(s), Oral, QOWK, # 5 cap(s), 3 Refill(s), Pharmacy: [facility], 189, cm, Height/Length  Dosing, 11/03/21 8:23:00 CDT, 99, kg, Weight Dosing, 11/03/21 8:23:00 CDT  1160F- Medication reconciliation completed during visit, HIV (human immunodeficiency virus infection)(  Confirmed  )  Depression  Insomnia(  Confirmed  )  Vitamin D deficiency  Erectile dysfunction, [facility], 11/03/21 8:49:00 CDT  Office/Outpatient Visit Level 4 Established 32067 , HIV (human immunodeficiency virus infection)(  Confirmed  )  Depression  Insomnia(  Confirmed  )  Vitamin D deficiency  Erectile dysfunction, [facility], 11/03/21 8:49:00 CDT  ?  5.?Erectile dysfunction?N52.9  ??sildenafil 20 mg po daily prn ED, use with condoms  Ordered:  sildenafil, 40 mg = 2 tab(s), Oral, Daily, PRN PRN erectile dysfunction, # 20 tab(s), 3 Refill(s), Pharmacy: [id number], 189, cm, Height/Length Dosing, 11/03/21 8:23:00 CDT, 99, kg, Weight Dosing, 11/03/21 8:23:00 CDT  1160F- Medication reconciliation completed during visit, HIV (human immunodeficiency virus infection)(  Confirmed  )  Depression  Insomnia(  Confirmed  )  Vitamin D deficiency  Erectile dysfunction, [facility], 11/03/21 8:49:00 CDT  Office/Outpatient Visit Level 4 Established 06315 , HIV (human immunodeficiency virus infection)(  Confirmed  )  Depression  Insomnia(  Confirmed  )  Vitamin D deficiency  Erectile dysfunction, [facility], 11/03/21 8:49:00 CDT  ?  Orders:  PARoxetine, 20 mg = 1 tab(s), Oral, Daily, # 30 tab(s), 3 Refill(s), Pharmacy: [facility], 189, cm, Height/Length Dosing, 11/03/21 8:23:00 CDT, 99, kg, Weight Dosing, 11/03/21 8:23:00 CDT  Referrals  Clinic Follow up, *Est. 03/04/22 8:10:00 CST, Order for future visit, HIV (human immunodeficiency virus infection)(  Confirmed  ), [facility]   Problem List/Past Medical History  Ongoing  HIV (human immunodeficiency virus infection)(  Confirmed  )  Insomnia(  Confirmed  )  Historical  Depressive disorder(  Confirmed  )  HIV  Vitamin D deficiency(   Confirmed  )  Procedure/Surgical History  Hernia Repair Ventral (None) (07/29/2015)  Other open umbilical herniorrhaphy (07/29/2015)  Repair umbilical hernia, age 5 years or older; reducible (07/29/2015)  No   Medications  acyclovir 400 mg oral tablet, 400 mg= 1 tab(s), Oral, BID, PRN, 3 refills  amitriptyline 50 mg oral tablet, 50 mg= 1 tab(s), Oral, Once a day (at bedtime), 3 refills  Biktarvy oral tablet, 1 tab(s), Oral, Daily, 3 refills  ergocalciferol 50,000 intUnit oral capsule, 14803 IntUnit= 1 cap(s), Oral, QOWK, 3 refills  Fioricet oral capsule, 2 cap(s), Oral, q4hr, PRN, 1 refills  paroxetine 20 mg oral tablet, 20 mg= 1 tab(s), Oral, Daily, 3 refills  Revatio 20 mg oral tablet, 40 mg= 2 tab(s), Oral, Daily, PRN, 3 refills  Allergies  No Known Medication Allergies  Social History  Abuse/Neglect  No, No, Yes, 11/03/2021  Alcohol  Current, 07/13/2015  Employment/School  Employed, 09/22/2017  Exercise  Home/Environment  Lives with Alone., 09/22/2017  Nutrition/Health  Regular, 09/22/2017  Sexual  Sexually active: Yes., 09/22/2017  Substance Use  Never, 09/22/2017  Tobacco  Never (less than 100 in lifetime), N/A, 11/03/2021  Family History  Hypertension.: Mother.  Tobacco use.: Mother.  Immunizations  Vaccine Date Status   influenza virus vaccine, inactivated 10/30/2020 Given   human papillomavirus vaccine 10/30/2020 Given   human papillomavirus vaccine 11/04/2019 Given   influenza virus vaccine, inactivated 11/04/2019 Given   pneumococcal 23-polyvalent vaccine 03/22/2019 Given   human papillomavirus vaccine 03/22/2019 Given   meningococcal conjugate vaccine 12/03/2018 Given   influenza virus vaccine, inactivated 12/03/2018 Given   meningococcal conjugate vaccine 07/31/2018 Given   influenza virus vaccine, inactivated 12/29/2017 Given   influenza virus vaccine, inactivated 10/18/2016 Given   influenza virus vaccine, inactivated 03/17/2016 Given   tetanus/diphtheria/pertussis, acel(Tdap) 07/13/2015 Given    pneumococcal 13-valent conjugate vaccine 01/08/2015 Recorded   influenza virus vaccine, inactivated 01/08/2015 Recorded   pneumococcal 23-polyvalent vaccine 01/21/2014 Recorded   influenza virus vaccine, inactivated 01/21/2014 Recorded   influenza virus vaccine, inactivated 09/30/2011 Recorded   influenza virus vaccine, inactivated 10/26/2010 Recorded   influenza virus vaccine, inactivated 10/09/2009 Recorded   hepatitis A-hepatitis B vaccine 12/01/2008 Recorded   pneumococcal 7-valent vaccine 09/17/2008 Recorded   Health Maintenance  Health Maintenance  ???Pending?(in the next year)  ??? ??OverDue  ??? ? ? ?Alcohol Misuse Screening due??01/02/21??and every 1??year(s)  ??? ??Due In Future?  ??? ? ? ?Obesity Screening not due until??01/01/22??and every 1??year(s)  ???Satisfied?(in the past 1 year)  ??? ??Satisfied?  ??? ? ? ?ADL Screening on??11/03/21.??Satisfied by Jean Claude Clemens  ??? ? ? ?Blood Pressure Screening on??11/03/21.??Satisfied by Jean Claude Clemens  ??? ? ? ?Body Mass Index Check on??11/03/21.??Satisfied by Jean Claude Clemens  ??? ? ? ?Depression Screening on??11/03/21.??Satisfied by Jean Claude Clemens  ??? ? ? ?Diabetes Screening on??11/03/21.??Satisfied by [facility]  ??? ? ? ?Influenza Vaccine on??11/03/21.??Satisfied by Jean Claude Clemens  ??? ? ? ?Lipid Screening on??11/03/21.??Satisfied by [facility]  ??? ? ? ?Obesity Screening on??11/03/21.??Satisfied by Jean Claude Clemens  ?  anal pap 12/29/17 NIL, 11/19 QNS, 7/1/21 NIL  anal ct/gc 3/17/16 neg, 12/29/17 neg, 11/19 neg  oral ct/gc 3/17/16 neg, 12/29/17 neg, 11/19 neg  urine ct/gc 3/17/16 neg, 12/29/17 neg, 11/19 neg, 7/21 neg  ophth? 7/2017, 8/2020 Dr. Estrada , 1/24/21  Lab Results  Test Name Test Result Date/Time Comments   Creatinine 1.25 mg/dL (High) 07/01/2021 09:42 CDT    eGFR-AA 81 (Low) 07/01/2021 09:42 CDT    AST 22 unit/L 07/01/2021 09:42 CDT    ALT 21 unit/L 07/01/2021 09:42 CDT    WBC 6.6 x10(3)/mcL 07/01/2021 09:42 CDT    Hgb 16.0 gm/dL 07/01/2021 09:42 CDT     Hct 47.7 % 07/01/2021 09:42 CDT    Platelet 195 x10(3)/mcL 07/01/2021 09:42 CDT    CD4 Pct 43.6 % 07/01/2021 09:42 CDT    CD4 Absolute 1209 unit/L 07/01/2021 09:42 CDT    Syphilis Ab Nonreactive 07/01/2021 09:42 CDT    Chlamydia trach-LC Negative 07/01/2021 09:40 CDT    N gonorrhoeae JUNI-LC Negative 07/01/2021 09:40 CDT Performed At:  [facility] [address] [id number]  Clarisse ABURTO MD Ph:[phone]   HIV1 RNA PCR-LC <20 cpy/mL 07/01/2021 09:42 CDT HIV-1 RNA detected  ?  The reportable range for this assay is 20 to 10,000,000  copies HIV-1 RNA/mL.

## 2022-05-02 NOTE — HISTORICAL [facility] CERNER
This is a historical note converted from [employer]. Formatting and pictures may have been removed.  Please reference [facility] for original formatting and attached multimedia. Chief Complaint  F/U B20 mgmt. No complaint  History of Present Illness  Rafi is a 36 yo MSW BM presenting today for HIV f/u visit.? Reports 100% adherent to Descovy & Tivicay, tolerating very well and is virally suppressed and immunocompetent. Received trazodone, now sleeping much better & mood is improved as well.? Taking vit d2 apprx 2 times per month, often forgets to take.? Desires flu vax today.? Denies any new sexual partners or hx of syphilis.? Not fasting, ate full meal this am.? Did not take Lamisil as rxd, left foot wound not healed.? Still has meds at home.  ?  9/22/17  36 yo BM presents for HIV f/u visit.? Reports >97% adherent to Descovy & Tivicay, pratik well. Taking vit d weekly for past several weeks now.? Never received trazodone or wellbutrin, states was too expensive at [address] [address]. Would like to try filling through [facility].? Still having trouble sleeping & depressed mood.? Denies SI/HI.? Declines anal swabs today, requests to defer to next visit.? No new sexual partners.? Enjoying time with daughters & granddaughter who is now 2 yr old.? C/o left foot pain x 1.5 months between 4th & 5th digit, not responsive to topical otc spray, soaking with epsom salt or alcohol.? Painful to walk.? Wearing work boots daily with clean socks.  ?   6/2/17  36 yo BM presents for HIV f/u visit. Reports 100% adherent to Descovy & Tivicay, pratik well but has only a couple of days left of meds.? Insurance lapsed & applied for Medicaid 1 week ago, no coverage for meds currently.? Stopped Paxil apprx 1 month ago, states was not working well any longer.??Mood down, no SI/HI.? Ambien not effective for sleep, has tried Trazodone 50mg which worked very well. Awaiting medicaid coverage to see eye doc for new eyeglasses.? Taking Vit D sporadically, has  at home.? Refuses oral & anal swabs today.? Contemplating move/visit to see brother in [place].? Lyubov  at [facility]. [1]  Review of Systems  ?  ?  Constitutional: negative except as stated in HPI  Eye: negative except as stated in HPI  ENMT: negative except as stated in HPI  Respiratory: negative except as stated in HPI  Cardiovascular: negative except as stated in HPI  Gastrointestinal: negative except as stated in HPI  Genitourinary: negative except as stated in HPI  Hema/Lymph: negative except as stated in HPI  Endocrine: negative except as stated in HPI  Immunologic: negative except as stated in HPI  Musculoskeletal: negative except as stated in HPI  Integumentary: negative except as stated in HPI  Neurologic: negative except as stated in HPI  ?   All Other ROS_ ?negative except as stated in HPI  Physical Exam  Vitals & Measurements  T:?36.7? ?C ?(Oral)? HR:?83?(Peripheral)? RR:?12? BP:?108/74?  HT:?188?cm? HT:?188?cm? WT:?98.7?kg? WT:?98.7?kg? BMI:?27.93?  General: AAO X 4, afebrile  Eye: no icterus  HENT: oropharynx clear, oral ct/gc swab done  Neck: supple  Respiratory: BBS CTA, non-labored, symmetrical  Cardiovascular: S1S2, RRR  Gastrointestinal BS + 4 quadrants, NTND, soft, no organomegaly  Rectal: no ext lesions, good sphincter tone, no palp mass, no alfa blood, swab done for ct/gc & pap  Lymphatics: no lymphadenopathy  Musculoskeletal: MAEW, steady gait  Integumentary: moist, non-draining persistent wound left foot between 4th & 5th digit, no erythema, distinct odor noted  Neurologic: CN II-XII intact  Assessment/Plan  1.?HIV (human immunodeficiency virus infection)(  Confirmed  )  ? adherence/sexual health counseling done  cont Descovy & Tivicay daily  labs today, non-fasting  rtc?4 mos w?renetta?  flu vax today  Ordered:  dolutegravir, 50 mg = 1 tab(s), Oral, Daily, # 30 tab(s), 3 Refill(s), Pharmacy: [facility]  emtricitabine-tenofovir, 1 tab(s), Oral, Daily,  # 30 tab(s), 3 Refill(s), Pharmacy: [facility]  Influenza Virus Vaccine, Inactivated, 0.5 mL, form: Susp, IM, Once, first dose 12/29/17 9:14:00 CST, stop date 12/29/17 9:14:00 CST  CBC w/ Auto Diff, Routine collect, 12/29/17 9:14:00 CST, Blood, Order for future visit, Stop date 12/29/17 9:14:00 CST, Lab Collect, HIV (human immunodeficiency virus infection)(  Confirmed  ), 12/29/17 9:14:00 CST  CD4 Lymphoctye Count, Routine collect, 12/29/17 9:14:00 CST, Blood, Order for future visit, Stop date 12/29/17 9:14:00 CST, Lab Collect, HIV (human immunodeficiency virus infection)(  Confirmed  ), 12/29/17 9:14:00 CST  Chlamydia trach and N. gonorrhea PCR, Routine collect, Urine, Order for future visit, Collected, 12/29/17 9:14:00 CST, Stop date 12/29/17 9:14:00 CST, Nurse collect, HIV (human immunodeficiency virus infection)(  Confirmed  )  Clinic Follow up, *Est. 04/29/18 3:00:00 CDT, Order for future visit, HIV (human immunodeficiency virus infection)(  Confirmed  ), [facility]  Comprehensive Metabolic Panel, Routine collect, 12/29/17 9:14:00 CST, Blood, Order for future visit, Stop date 12/29/17 9:14:00 CST, Lab Collect, HIV (human immunodeficiency virus infection)(  Confirmed  ), 12/29/17 9:14:00 CST  Hemoglobin A1C [facility], Routine collect, 12/29/17 9:14:00 CST, Blood, Order for future visit, Stop date 12/29/17 9:14:00 CST, Lab Collect, HIV (human immunodeficiency virus infection)(  Confirmed  ), 12/29/17 9:14:00 CST  Hepatitis C Antibody, Routine collect, 12/29/17 9:14:00 CST, Blood, Order for future visit, Stop date 12/29/17 9:14:00 CST, Lab Collect, HIV (human immunodeficiency virus infection)(  Confirmed  ), 12/29/17 9:14:00 CST  Lipid Panel, Routine collect, 12/29/17 9:14:00 CST, Blood, Order for future visit, Stop date 12/29/17 9:14:00 CST, Lab Collect, HIV (human immunodeficiency virus infection)(  Confirmed  ), 12/29/17 9:14:00 CST  Office/Outpatient Visit Level 5 Established 90456 ,  HIV (human immunodeficiency virus infection)(  Confirmed  )  Insomnia(  Confirmed  )  Vitamin D deficiency(  Confirmed  )  Depression  Left foot infection, [facility], 12/29/17 9:14:00 CST  RNA, PCR(NonGraph)rfx/GenoPRIme(R)-[facility] [id number], Routine collect, 12/29/17 9:14:00 CST, Blood, Order for future visit, Stop date 12/29/17 9:14:00 CST, Lab Collect, HIV (human immunodeficiency virus infection)(  Confirmed  ), 12/29/17 9:14:00 CST  Syphilis Antibody (with Reflex RPR), Routine collect, 12/29/17 9:14:00 CST, Blood, Order for future visit, Stop date 12/29/17 9:14:00 CST, Lab Collect, HIV (human immunodeficiency virus infection)(  Confirmed  ), 12/29/17 9:14:00 CST  T Spot Test for M. tuberculosis, Routine collect, 12/29/17 9:14:00 CST, Blood, Order for future visit, Stop date 12/29/17 9:14:00 CST, Lab Collect, HIV (human immunodeficiency virus infection)(  Confirmed  ), 12/29/17 9:14:00 CST  Thyroid Stimulating Hormone, Routine collect, 12/29/17 9:14:00 CST, Blood, Order for future visit, Stop date 12/29/17 9:14:00 CST, Lab Collect, HIV (human immunodeficiency virus infection)(  Confirmed  ), 12/29/17 9:14:00 CST  Urinalysis with Microscopic if Indicated, Routine collect, Urine, Order for future visit, Collected, 12/29/17 9:14:00 CST, Stop date 12/29/17 9:14:00 CST, Nurse collect, HIV (human immunodeficiency virus infection)(  Confirmed  ), 12/29/17 9:14:00 CST  Vitamin D, 25-Hydroxy Level, Routine collect, 12/29/17 9:14:00 CST, Blood, Order for future visit, Stop date 12/29/17 9:14:00 CST, Lab Collect, HIV (human immunodeficiency virus infection)(  Confirmed  ), 12/29/17 9:14:00 CST  ?  2.?Insomnia(  Confirmed  )  ? improved with trazodone, cont 50mg q hs  Ordered:  traZODone, 50 mg = 1 tab(s), Oral, Once a day (at bedtime), # 30 tab(s), 3 Refill(s), Pharmacy: [facility]  Office/Outpatient Visit Level 5 Established 62359 PC, HIV (human immunodeficiency virus infection)(  Confirmed   )  Insomnia(  Confirmed  )  Vitamin D deficiency(  Confirmed  )  Depression  Left foot infection, [facility], 12/29/17 9:14:00 CST  ?  3.?Vitamin D deficiency(  Confirmed  )  ? repeat level today, taking apprx qowk  Ordered:  Office/Outpatient Visit Level 5 Established [id number] , HIV (human immunodeficiency virus infection)(  Confirmed  )  Insomnia(  Confirmed  )  Vitamin D deficiency(  Confirmed  )  Depression  Left foot infection, [facility], 12/29/17 9:14:00 CST  ?  4.?Depression  ? improved with trazodone, cont 50mg q hs  Ordered:  Office/Outpatient Visit Level 5 Established 90604 , HIV (human immunodeficiency virus infection)(  Confirmed  )  Insomnia(  Confirmed  )  Vitamin D deficiency(  Confirmed  )  Depression  Left foot infection, [facility], 12/29/17 9:14:00 CST  ?  5.?Left foot infection  ? with pseudomonas?characteristics  cipro 500mg bid?x 7 days  bactroban tid x?10 days  keep clean & dry  notify me if not resolved  Ordered:  Office/Outpatient Visit Level 5 Established [id number], HIV (human immunodeficiency virus infection)(  Confirmed  )  Insomnia(  Confirmed  )  Vitamin D deficiency(  Confirmed  )  Depression  Left foot infection, [facility], 12/29/17 9:14:00 CST  ?  6.?Cancer screening  ? anal?pap collected  Ordered:  Pathology Non-Gyn Request [facility], 12/29/17 9:14:00 CST, Routine, Order for future visit, AP Specimen, anal swab, anal ca screen/hiv, Collected, Nurse Collect, Print Label, RT - Routine, [id number], Hold Until Collected, Cancer screening, 12/29/17 9:14:00 CST  ?  7.?Routine screening for STI (sexually transmitted infection)  ???oral & anal swabs collected  Ordered:  Chlamydia trach & N.gonorr Rectal AMD Lab [facility], Routine collect, Rectal Swab, Order for future visit, Collected, 12/29/17 9:14:00 CST, Stop date 12/29/17 9:14:00 CST, Nurse collect, Routine screening for STI (sexually transmitted infection), 12/29/17 9:14:00 CST  Chlamydia trach &  N.gonorrhoeae Ph[facility] AMD-LC, Routine collect, Throat, Order for future visit, Collected, 12/29/17 9:14:00 CST, Stop date 12/29/17 9:14:00 CST, Nurse collect, Routine screening for STI (sexually transmitted infection), 12/29/17 9:14:00 CST  ?  Orders:  ciprofloxacin, 500 mg = 1 tab(s), Oral, q12hr, X 7 day(s), # 14 tab(s), 0 Refill(s), Pharmacy: [facility]  mupirocin topical, 1 casi, TOP, TID, # 22 gm, 0 Refill(s), Pharmacy: [facility]   Problem List/Past Medical History  Ongoing  HIV (human immunodeficiency virus infection)(  Confirmed  )  Insomnia(  Confirmed  )  Vitamin D deficiency(  Confirmed  )  Historical  Depressive disorder(  Confirmed  )  HIV  Procedure/Surgical History  Hernia Repair Ventral (None) (07/29/2015)  Other open umbilical herniorrhaphy (07/29/2015)  Repair umbilical hernia, age 5 years or older; reducible (07/29/2015)  No  Medications  acyclovir 400 mg oral tablet, 400 mg= 1 tab(s), Oral, 5x/Day, PRN, 2 refills  Cipro 500 mg oral tablet, 500 mg= 1 tab(s), Oral, q12hr  Descovy 200 mg-25 mg oral tablet, 1 tab(s), Oral, Daily, 3 refills  ergocalciferol 50,000 intUnit oral capsule, 73144 IntUnit= 1 cap(s), Oral, qWeek, 5 refills  influenza virus vaccine, inactivated preserve-free pediatric quadrivalent intramuscular suspension, 0.5 mL, IM, Once  mupirocin 2% topical oint, 1 casi, TOP, TID  Tivicay 50 mg oral tablet, 50 mg= 1 tab(s), Oral, Daily, 3 refills  traZODONE 50 mg oral tablet ( Desyrel ), 50 mg= 1 tab(s), Oral, Once a day (at bedtime), 3 refills  Allergies  No Known Medication Allergies  Social History  Alcohol - 09/22/2017  Current  Employment/School - 09/22/2017  Employed  Exercise - 09/22/2017  Home/Environment - 09/22/2017  Lives with Alone.  Nutrition/Health - 09/22/2017  Regular  Sexual - 09/22/2017  Sexually active: Yes.  Substance Abuse - 09/22/2017  Never  Tobacco - 09/22/2017  Former smoker  Former smoker  Family History  Hypertension.: Mother.  Tobacco use.:  Mother.  Immunizations  UTD [3]  Health Maintenance  anal pap 12/29/17  anal ct/gc 3/17/16 neg, 12/29/17  oral ct/gc 3/17/16 neg, 12/29/17  urine ct/gc 3/17/16 neg, 12/29/17  ophth? 7/2017 [4]  Lab Results  Test Name Test Result Date/Time Comments   Creatinine 1.10 mg/dL 09/22/2017 12:27 CDT    AST 19 unit/L 09/22/2017 12:27 CDT    ALT 30 unit/L 09/22/2017 12:27 CDT    Vit D 25 OH 12.84 ng/mL (Low) 10/18/2016 15:11 CDT Vit D 25 Hydroxy Reference Range:<br/>0 - 17 years - &nbsp; &nbsp; Deficiency: &nbsp; &nbsp; &nbsp; &nbsp; less than 20 ng/ml<br/> &nbsp; &nbsp; &nbsp; &nbsp; Optimum level: &nbsp; &gt; or = 20 ng/ml<br/>18 yrs or older: &nbsp;Deficiency: &nbsp; &nbsp; &nbsp; &nbsp; less than 20 ng/ml<br/> &nbsp; &nbsp; &nbsp; &nbsp; &nbsp; &nbsp; &nbsp; &nbsp; &nbsp; &nbsp; &nbsp; &nbsp; &nbsp;Insufficiency: &nbsp; &nbsp; 20 - -29 ng/ml<br/> &nbsp; &nbsp; &nbsp; &nbsp; &nbsp; &nbsp; &nbsp; &nbsp; &nbsp; &nbsp; &nbsp; &nbsp; &nbsp;Optimum level: &nbsp;30 - 80 ng/ml<br/> &nbsp; &nbsp; &nbsp; &nbsp; &nbsp; &nbsp; &nbsp; &nbsp; &nbsp; &nbsp; &nbsp; &nbsp; &nbsp;Possible toxicity: &gt; or = 150 ng/ml   HLA -NU Negative 10/18/2016 15:11 CDT The HLA-B*57:01 allele, associated with abacavir hyper-<br/>sensitivity, was not detected in this individual. A<br/>negative result for HLA-B*57:01 does not preclude the<br/>development of an allergic response to abacavir. The<br/>HLA-B*57:01 genotype result should not substitute for<br/>appropriate clinical vigilance and patient management in<br/>abacavir containing regimens (see Ziagen(R) or Epzicom(TM)<br/>or Trizivir(R) Prescribing Information). Administration of<br/>abacavir therapy requires close observation including<br/>immediate discontinuation of therapy should any signs or<br/>symptoms of hypersensitivity develop.<br/>References:<br/>1. Ziagen (R), Epsicom(TM), or Trizivir(R) Prescribing<br/> &nbsp; information [facility], [facility], [place].<br/>2. Mallal  S, et al. HLA-B*5701 screening for hyper-<br/> &nbsp; sensitivity to abacavir. N Eng J Med 2008; 358: 568-579.<br/>3. Zachary TAYLOR et al. High sensitivity of HLA-B*5701 as a<br/> &nbsp; marker for immunologically confirmed abacavir hyper-<br/> &nbsp; sensitivity in white and black patients. Clin Infect<br/> &nbsp; Dis 2008 46:1111-8.<br/>HLA allele interpretation for all loci based on IMGT/HLA<br/>database version 3.21<br/>HLA Lab CLIA ID Number [id number]<br/> <br/>This test was performed using PCR (Polymerase Chain<br/>Reaction)/SSOP (Sequence Specific Oligonucleotide Probes)<br/>technique. &nbsp;SBT (Sequence Based Typing) and/or SSP<br/>(Sequence Specific Primers) may be used as supplemental<br/>methods when necessary. &nbsp;Please contact HLA Customer<br/>Service at [phone] if you have any questions.<br/> <br/> Director of HLA Laboratory<br/> Dr Adin Gipson, PhD<br/>Performed At: [address] [place] [address] &nbsp;DNA<br/>[address] [place], [place] [id number]<br/>Brandan KERN PhD Ph:[phone]   Chol 153 mg/dL 10/18/2016 15:11 CDT    HDL 33 mg/dL (Low) 10/18/2016 15:11 CDT    Trig 158 mg/dL (High) 10/18/2016 15:11 CDT    LDL 88 mg/dL 10/18/2016 15:11 CDT    Chol/HDL 4.6 10/18/2016 15:11 CDT    T4 Free 0.65 ng/mL 10/18/2016 15:11 CDT    TSH 0.48 mIU/mL (Low) 10/18/2016 15:11 CDT    WBC 5.0 x10(3)/mcL 09/22/2017 12:27 CDT    Hgb 16.7 gm/dL 09/22/2017 12:27 CDT    Hct 48.3 % 09/22/2017 12:27 CDT    Platelet 177 x10(3)/mcL 09/22/2017 12:27 CDT    CD4 Pct 45.1 % 09/22/2017 12:27 CDT    CD4 Absolute 1123 unit/L 09/22/2017 12:27 CDT    Hep C Ab Nonreactive 10/18/2016 15:11 CDT    RPR Non-Reactive 10/18/2016 15:11 CDT    Chlamydia trach-LC Negative 10/18/2016 15:11 CDT    N gonorrhoeae JUNI-LC Negative 10/18/2016 15:11 CDT Performed At: [facility] [place]<br/>[address], [place] [id number]<br/>Francis TAYLOR MD Ph:[phone]   QuantiFERON Gold-LC Negative 10/18/2016 15:11 CDT <br/>The specimen  received for QuantiFERON testing was incubated<br/>by the ordering institution. &nbsp;Specific procedures outlined<br/>in our Directory of Services and in the package insert for<br/>the QuantiFERON Gold (In Tube) test must be followed to<br/>enable for proper stimulation of cells for the production<br/>of interferon gamma.   HIV1 RNA PCR-LC <20 cpy/mL 09/22/2017 12:27 CDT HIV-1 RNA not detected<br/> <br/>The reportable range for this assay is 20 to 10,000,000<br/>copies HIV-1 RNA/mL.      [1]?Office Visit Note; Cindy Maddox 09/22/2017 12:07 CDT  [2]?Office Visit Note; Cindy Maddox 09/22/2017 12:07 CDT  [3]?Office Visit Note; Cindy Maddox 09/22/2017 12:07 CDT  [4]?Office Visit Note; Cindy Maddox 09/22/2017 12:07 CDT

## 2022-08-01 ENCOUNTER — OFFICE VISIT (OUTPATIENT)
Dept: INFECTIOUS DISEASES | Facility: CLINIC | Age: 42
End: 2022-08-01
Payer: MEDICAID

## 2022-08-01 VITALS
WEIGHT: 217.13 LBS | HEART RATE: 66 BPM | HEIGHT: 74 IN | SYSTOLIC BLOOD PRESSURE: 116 MMHG | TEMPERATURE: 98 F | RESPIRATION RATE: 18 BRPM | BODY MASS INDEX: 27.87 KG/M2 | DIASTOLIC BLOOD PRESSURE: 72 MMHG

## 2022-08-01 DIAGNOSIS — B00.9 HSV (HERPES SIMPLEX VIRUS) INFECTION: ICD-10-CM

## 2022-08-01 DIAGNOSIS — N52.9 ERECTILE DYSFUNCTION, UNSPECIFIED ERECTILE DYSFUNCTION TYPE: ICD-10-CM

## 2022-08-01 DIAGNOSIS — B20 HIV DISEASE: Primary | ICD-10-CM

## 2022-08-01 DIAGNOSIS — E55.9 VITAMIN D DEFICIENCY: ICD-10-CM

## 2022-08-01 DIAGNOSIS — B20 HIV DISEASE: ICD-10-CM

## 2022-08-01 DIAGNOSIS — F41.8 DEPRESSION WITH ANXIETY: ICD-10-CM

## 2022-08-01 PROBLEM — Z21 HIV INFECTION: Status: ACTIVE | Noted: 2022-08-01

## 2022-08-01 PROBLEM — G47.00 INSOMNIA: Status: ACTIVE | Noted: 2022-08-01

## 2022-08-01 LAB
ALBUMIN SERPL-MCNC: 4.2 GM/DL (ref 3.5–5)
ALBUMIN/GLOB SERPL: 1.2 RATIO (ref 1.1–2)
ALP SERPL-CCNC: 60 UNIT/L (ref 40–150)
ALT SERPL-CCNC: 23 UNIT/L (ref 0–55)
AST SERPL-CCNC: 19 UNIT/L (ref 5–34)
BASOPHILS # BLD AUTO: 0.05 X10(3)/MCL (ref 0–0.2)
BASOPHILS NFR BLD AUTO: 0.9 %
BILIRUBIN DIRECT+TOT PNL SERPL-MCNC: 0.5 MG/DL
BUN SERPL-MCNC: 19.2 MG/DL (ref 8.9–20.6)
CALCIUM SERPL-MCNC: 9.3 MG/DL (ref 8.4–10.2)
CHLORIDE SERPL-SCNC: 104 MMOL/L (ref 98–107)
CO2 SERPL-SCNC: 26 MMOL/L (ref 22–29)
CREAT SERPL-MCNC: 1.17 MG/DL (ref 0.73–1.18)
EOSINOPHIL # BLD AUTO: 0.17 X10(3)/MCL (ref 0–0.9)
EOSINOPHIL NFR BLD AUTO: 3 %
ERYTHROCYTE [DISTWIDTH] IN BLOOD BY AUTOMATED COUNT: 13.2 % (ref 11.5–17)
GLOBULIN SER-MCNC: 3.5 GM/DL (ref 2.4–3.5)
GLUCOSE SERPL-MCNC: 72 MG/DL (ref 74–100)
HCT VFR BLD AUTO: 45 % (ref 42–52)
HGB BLD-MCNC: 15.1 GM/DL (ref 14–18)
IMM GRANULOCYTES # BLD AUTO: 0.01 X10(3)/MCL (ref 0–0.04)
IMM GRANULOCYTES NFR BLD AUTO: 0.2 %
LYMPHOCYTES # BLD AUTO: 2.83 X10(3)/MCL (ref 0.6–4.6)
LYMPHOCYTES NFR BLD AUTO: 50 %
MCH RBC QN AUTO: 29.5 PG (ref 27–31)
MCHC RBC AUTO-ENTMCNC: 33.6 MG/DL (ref 33–36)
MCV RBC AUTO: 87.9 FL (ref 80–94)
MONOCYTES # BLD AUTO: 0.56 X10(3)/MCL (ref 0.1–1.3)
MONOCYTES NFR BLD AUTO: 9.9 %
NEUTROPHILS # BLD AUTO: 2 X10(3)/MCL (ref 2.1–9.2)
NEUTROPHILS NFR BLD AUTO: 36 %
NRBC BLD AUTO-RTO: 0 %
PLATELET # BLD AUTO: 212 X10(3)/MCL (ref 130–400)
PMV BLD AUTO: 12.3 FL (ref 7.4–10.4)
POTASSIUM SERPL-SCNC: 4 MMOL/L (ref 3.5–5.1)
PROT SERPL-MCNC: 7.7 GM/DL (ref 6.4–8.3)
RBC # BLD AUTO: 5.12 X10(6)/MCL (ref 4.7–6.1)
SODIUM SERPL-SCNC: 140 MMOL/L (ref 136–145)
T PALLIDUM AB SER QL: NONREACTIVE
WBC # SPEC AUTO: 5.7 X10(3)/MCL (ref 4.5–11.5)

## 2022-08-01 PROCEDURE — 1160F RVW MEDS BY RX/DR IN RCRD: CPT | Mod: CPTII,,, | Performed by: NURSE PRACTITIONER

## 2022-08-01 PROCEDURE — 80053 COMPREHEN METABOLIC PANEL: CPT | Performed by: NURSE PRACTITIONER

## 2022-08-01 PROCEDURE — 86780 TREPONEMA PALLIDUM: CPT

## 2022-08-01 PROCEDURE — 3078F PR MOST RECENT DIASTOLIC BLOOD PRESSURE < 80 MM HG: ICD-10-PCS | Mod: CPTII,,, | Performed by: NURSE PRACTITIONER

## 2022-08-01 PROCEDURE — 3074F PR MOST RECENT SYSTOLIC BLOOD PRESSURE < 130 MM HG: ICD-10-PCS | Mod: CPTII,,, | Performed by: NURSE PRACTITIONER

## 2022-08-01 PROCEDURE — 99214 OFFICE O/P EST MOD 30 MIN: CPT | Mod: PBBFAC | Performed by: NURSE PRACTITIONER

## 2022-08-01 PROCEDURE — 3008F BODY MASS INDEX DOCD: CPT | Mod: CPTII,,, | Performed by: NURSE PRACTITIONER

## 2022-08-01 PROCEDURE — 1160F PR REVIEW ALL MEDS BY PRESCRIBER/CLIN PHARMACIST DOCUMENTED: ICD-10-PCS | Mod: CPTII,,, | Performed by: NURSE PRACTITIONER

## 2022-08-01 PROCEDURE — 99214 PR OFFICE/OUTPT VISIT, EST, LEVL IV, 30-39 MIN: ICD-10-PCS | Mod: S$PBB,,, | Performed by: NURSE PRACTITIONER

## 2022-08-01 PROCEDURE — 1159F PR MEDICATION LIST DOCUMENTED IN MEDICAL RECORD: ICD-10-PCS | Mod: CPTII,,, | Performed by: NURSE PRACTITIONER

## 2022-08-01 PROCEDURE — 99214 OFFICE O/P EST MOD 30 MIN: CPT | Mod: S$PBB,,, | Performed by: NURSE PRACTITIONER

## 2022-08-01 PROCEDURE — 3078F DIAST BP <80 MM HG: CPT | Mod: CPTII,,, | Performed by: NURSE PRACTITIONER

## 2022-08-01 PROCEDURE — 1159F MED LIST DOCD IN RCRD: CPT | Mod: CPTII,,, | Performed by: NURSE PRACTITIONER

## 2022-08-01 PROCEDURE — 36415 COLL VENOUS BLD VENIPUNCTURE: CPT

## 2022-08-01 PROCEDURE — 3074F SYST BP LT 130 MM HG: CPT | Mod: CPTII,,, | Performed by: NURSE PRACTITIONER

## 2022-08-01 PROCEDURE — 3008F PR BODY MASS INDEX (BMI) DOCUMENTED: ICD-10-PCS | Mod: CPTII,,, | Performed by: NURSE PRACTITIONER

## 2022-08-01 RX ORDER — PAROXETINE HYDROCHLORIDE 20 MG/1
20 TABLET, FILM COATED ORAL DAILY
COMMUNITY
Start: 2022-07-14 | End: 2023-09-25

## 2022-08-01 RX ORDER — AMITRIPTYLINE HYDROCHLORIDE 50 MG/1
50 TABLET, FILM COATED ORAL NIGHTLY
COMMUNITY
Start: 2022-07-14 | End: 2023-09-25

## 2022-08-01 RX ORDER — ACYCLOVIR 400 MG/1
400 TABLET ORAL 2 TIMES DAILY PRN
COMMUNITY
Start: 2022-02-04 | End: 2023-09-25 | Stop reason: SDUPTHER

## 2022-08-01 RX ORDER — SILDENAFIL CITRATE 20 MG/1
40 TABLET ORAL
Qty: 12 TABLET | Refills: 1 | Status: SHIPPED | OUTPATIENT
Start: 2022-08-01 | End: 2023-01-19 | Stop reason: SDUPTHER

## 2022-08-01 RX ORDER — BICTEGRAVIR SODIUM, EMTRICITABINE, AND TENOFOVIR ALAFENAMIDE FUMARATE 50; 200; 25 MG/1; MG/1; MG/1
1 TABLET ORAL DAILY
COMMUNITY
Start: 2022-07-14 | End: 2022-08-01 | Stop reason: SDUPTHER

## 2022-08-01 RX ORDER — BICTEGRAVIR SODIUM, EMTRICITABINE, AND TENOFOVIR ALAFENAMIDE FUMARATE 50; 200; 25 MG/1; MG/1; MG/1
1 TABLET ORAL DAILY
Qty: 30 TABLET | Refills: 4 | Status: SHIPPED | OUTPATIENT
Start: 2022-08-01 | End: 2023-01-19 | Stop reason: SDUPTHER

## 2022-08-01 RX ORDER — SILDENAFIL CITRATE 20 MG/1
40 TABLET ORAL
COMMUNITY
Start: 2021-11-03 | End: 2022-08-01 | Stop reason: SDUPTHER

## 2022-08-01 RX ORDER — ERGOCALCIFEROL 1.25 MG/1
50000 CAPSULE ORAL
COMMUNITY
Start: 2022-07-14 | End: 2023-01-19 | Stop reason: SDUPTHER

## 2022-08-01 NOTE — PROGRESS NOTES
Subjective:       Patient ID: Rafi Aguillon Jr. is a 42 y.o. male.    Chief Complaint: b20 f/u    8/1/22  Rafi is a 41 yo AAM presenting today for HIV f/u visit.  He reports >90% adherent to Biktarvy, tolerates well with viral suppression.  He takes ergocalciferol sporadically, will take a dose today.  He has plenty of paroxetine & amitriptyline at home, does not use regularly as prescribed.  Mood is up & down.  Adherence counseling provided. He states that sildenafil worked initially for ED at 40 mg dose, but does not find it to be very effective any longer.  Will try 60 mg dose, but no more than 3 tabs max.  Voiced understanding. He is not yet vaccinated against COVID, but tells me that he has been infected 3 times.  States that he will get the vaccination at his local pharmacy. He denies any new sexual partners. He has no questions or concerns today.    3/29/22  Rafi is a 40 yo AAM presents today for HIV f/u visit.  He reports 100% adherent to Biktarvy, tolerates well with viral suppression.  Last labs 11/21 VL <20, Cd4 657.  He has not noted any side effects since changing to Biktarvy.  He tells me that the Paxil was working well for anxiety & depression, but has fallen off of taking it.  He has medication at home, will restart.  He also states that he is not using amitriptyline, will resume for sleep but does not need refills at this time.  He is in a monogamous relationship x 1.5 years. Tells me that he uses condoms & appreciates refill for sildenafil which is effective for ED.  He has not been taking vitamin D replacement consistently, will resume with Q weekly dosing.  All questions answered & concerns addressed.    11/3/21  Rafi is a 40 yo AAM presenting today for HIV f/u visit. He reports 100% adherent to Descovy & Tivicay, tolerates well with viral suppression.  Last labs 7/1/21 VL <20, Cd4 1209.  He did not try the Biktarvy as discussed last visit, but is still amenable to revision for STR.  He tells  me that his mother passed away from complications of COVID shortly after last visit, 9/2021.  His father is now hospitalized in [place], [place] with COVID as well.  He tells me that he will get his 1st dose of COVID vaccine before leaving to go visit with his father.  He appreciates flu vax in clinic today.  He continues to take Paxil intermittently despite education to not do so.  Amitriptyline effective for sleep, uses PRN & does not need refills at this time.  He is taking vitamin d as prescribed. Anal pap 7/1/21 NIL. Sildenafil effective at 40mg dose for ED.  Uses condoms, takes about 2 times per week.  Appreciates refills.    Review of Systems   Constitutional: Negative.    HENT: Negative.    Respiratory: Negative.    Cardiovascular: Negative.    Gastrointestinal: Negative.    Genitourinary: Negative.    Integumentary:  Negative.   Neurological: Negative.    Hematological: Negative.    Psychiatric/Behavioral: Negative.          Objective:      Physical Exam  Vitals reviewed.   Constitutional:       General: He is not in acute distress.     Appearance: Normal appearance. He is not toxic-appearing.   HENT:      Mouth/Throat:      Mouth: Mucous membranes are moist.      Pharynx: Oropharynx is clear.   Eyes:      Conjunctiva/sclera: Conjunctivae normal.   Cardiovascular:      Rate and Rhythm: Normal rate and regular rhythm.   Pulmonary:      Effort: Pulmonary effort is normal. No respiratory distress.      Breath sounds: Normal breath sounds.   Abdominal:      General: Abdomen is flat. Bowel sounds are normal.      Palpations: Abdomen is soft.   Musculoskeletal:         General: Normal range of motion.      Cervical back: Normal range of motion.   Lymphadenopathy:      Cervical: No cervical adenopathy.   Skin:     General: Skin is warm and dry.   Neurological:      General: No focal deficit present.      Mental Status: He is alert and oriented to person, place, and time. Mental status is at baseline.   Psychiatric:          Mood and Affect: Mood normal.         Behavior: Behavior normal.         Assessment:       Problem List Items Addressed This Visit    None     Visit Diagnoses     HIV disease    -  Primary    Relevant Medications    BIKTARVY -25 mg (25 kg or greater)    Other Relevant Orders    CD4 Lymphocytes    CBC Auto Differential    Comprehensive Metabolic Panel    HIV-1 RNA, Quantitative, PCR with Reflex to Genotype    SYPHILIS ANTIBODY (WITH REFLEX RPR)    HSV (herpes simplex virus) infection        Vitamin D deficiency        Erectile dysfunction, unspecified erectile dysfunction type        Relevant Medications    sildenafil (REVATIO) 20 mg Tab    Depression with anxiety              Plan:       HIV disease  -     BIKTARVY -25 mg (25 kg or greater); Take 1 tablet by mouth once daily.  Dispense: 30 tablet; Refill: 4  -     CD4 Lymphocytes; Future; Expected date: 08/01/2022  -     CBC Auto Differential; Future; Expected date: 08/01/2022  -     Comprehensive Metabolic Panel  -     HIV-1 RNA, Quantitative, PCR with Reflex to Genotype; Future; Expected date: 08/01/2022  -     SYPHILIS ANTIBODY (WITH REFLEX RPR); Future; Expected date: 08/01/2022  Adherence and sexual health counseling done.  Use condoms for all sexual encounters.  Blood precautions.   Continue Biktarvy as prescribed.  Labs today.  RTC 4 months with Cindy.    HIV Wellness:  Anal pap: 7/21 NIL  Oral CT/GC: 11/19 Neg  Anal CT/GC: 11/19 Neg  Urine CT/GC: 11/21 Neg  RPR: 11/21 NR  Ophth: 1/21    HSV (herpes simplex virus) infection  Acyclovir PRN outbreaks, does not need refills at this time.    Vitamin D deficiency  Ergocalciferol 41982 units every other week    Erectile dysfunction, unspecified erectile dysfunction type  -     sildenafil (REVATIO) 20 mg Tab; Take 2 tablets (40 mg total) by mouth as needed (ED).  Dispense: 12 tablet; Refill: 1  Sildenafil 20 mg 2-3 tabs PRN Erectile Dysfunction  Use with condoms  Do not take more than every 3  days  ED precautions    Depression with anxiety    Pt non-adherent to recommended treatment plan  Adherence counseling provided

## 2022-08-02 LAB
AGE: 44
CD3+CD4+ CELLS # BLD: 50 % (ref 28–48)
CD3+CD4+ CELLS # SPEC: 1145.7 UNIT/L (ref 589–1505)
CD3+CD4+ CELLS NFR BLD: 40.2 %
LYMPHOCYTES # BLD AUTO: 2850 X10(3)/MCL (ref 1260–5520)
LYMPHOMA - T-CELL MARKERS SPEC-IMP: ABNORMAL
WBC # BLD AUTO: 5700 /MM3 (ref 4500–11500)

## 2022-08-03 LAB — HIV1 RNA # PLAS NAA DL=20: NORMAL COPIES/ML

## 2022-08-09 RX ORDER — PAROXETINE HYDROCHLORIDE 20 MG/1
20 TABLET, FILM COATED ORAL DAILY
Qty: 30 TABLET | Refills: 3 | OUTPATIENT
Start: 2022-08-09

## 2022-11-25 DIAGNOSIS — N52.9 ERECTILE DYSFUNCTION, UNSPECIFIED ERECTILE DYSFUNCTION TYPE: ICD-10-CM

## 2022-11-28 RX ORDER — SILDENAFIL CITRATE 20 MG/1
40 TABLET ORAL
Qty: 12 TABLET | Refills: 1 | OUTPATIENT
Start: 2022-11-28

## 2023-01-19 ENCOUNTER — OFFICE VISIT (OUTPATIENT)
Dept: INFECTIOUS DISEASES | Facility: CLINIC | Age: 43
End: 2023-01-19
Payer: MEDICAID

## 2023-01-19 VITALS
HEIGHT: 74 IN | RESPIRATION RATE: 20 BRPM | BODY MASS INDEX: 27.63 KG/M2 | SYSTOLIC BLOOD PRESSURE: 108 MMHG | DIASTOLIC BLOOD PRESSURE: 70 MMHG | WEIGHT: 215.25 LBS | HEART RATE: 82 BPM | TEMPERATURE: 98 F

## 2023-01-19 DIAGNOSIS — N52.9 ERECTILE DYSFUNCTION, UNSPECIFIED ERECTILE DYSFUNCTION TYPE: ICD-10-CM

## 2023-01-19 DIAGNOSIS — E55.9 VITAMIN D DEFICIENCY: ICD-10-CM

## 2023-01-19 DIAGNOSIS — B00.9 HSV (HERPES SIMPLEX VIRUS) INFECTION: ICD-10-CM

## 2023-01-19 DIAGNOSIS — L21.9 SEBORRHEA: ICD-10-CM

## 2023-01-19 DIAGNOSIS — B20 HIV DISEASE: Primary | ICD-10-CM

## 2023-01-19 DIAGNOSIS — Z23 NEED FOR VACCINATION: ICD-10-CM

## 2023-01-19 LAB
ALBUMIN SERPL-MCNC: 4.4 G/DL (ref 3.5–5)
ALBUMIN/GLOB SERPL: 1.4 RATIO (ref 1.1–2)
ALP SERPL-CCNC: 55 UNIT/L (ref 40–150)
ALT SERPL-CCNC: 24 UNIT/L (ref 0–55)
APPEARANCE UR: CLEAR
AST SERPL-CCNC: 16 UNIT/L (ref 5–34)
BACTERIA #/AREA URNS AUTO: ABNORMAL /HPF
BASOPHILS # BLD AUTO: 0.05 X10(3)/MCL (ref 0–0.2)
BASOPHILS NFR BLD AUTO: 0.9 %
BILIRUB UR QL STRIP.AUTO: NEGATIVE MG/DL
BILIRUBIN DIRECT+TOT PNL SERPL-MCNC: 0.7 MG/DL
BUN SERPL-MCNC: 12.5 MG/DL (ref 8.9–20.6)
C TRACH DNA SPEC QL NAA+PROBE: NOT DETECTED
CALCIUM SERPL-MCNC: 10.3 MG/DL (ref 8.4–10.2)
CHLORIDE SERPL-SCNC: 104 MMOL/L (ref 98–107)
CHOLEST SERPL-MCNC: 186 MG/DL
CHOLEST/HDLC SERPL: 6 {RATIO} (ref 0–5)
CO2 SERPL-SCNC: 26 MMOL/L (ref 22–29)
COLOR UR AUTO: YELLOW
CREAT SERPL-MCNC: 1.22 MG/DL (ref 0.73–1.18)
DEPRECATED CALCIDIOL+CALCIFEROL SERPL-MC: 14.5 NG/ML (ref 30–80)
EOSINOPHIL # BLD AUTO: 0.08 X10(3)/MCL (ref 0–0.9)
EOSINOPHIL NFR BLD AUTO: 1.4 %
ERYTHROCYTE [DISTWIDTH] IN BLOOD BY AUTOMATED COUNT: 13.2 % (ref 11.5–17)
EST. AVERAGE GLUCOSE BLD GHB EST-MCNC: 116.9 MG/DL
GFR SERPLBLD CREATININE-BSD FMLA CKD-EPI: >60 MLS/MIN/1.73/M2
GLOBULIN SER-MCNC: 3.1 GM/DL (ref 2.4–3.5)
GLUCOSE SERPL-MCNC: 98 MG/DL (ref 74–100)
GLUCOSE UR QL STRIP.AUTO: NORMAL MG/DL
HBA1C MFR BLD: 5.7 %
HCT VFR BLD AUTO: 45.3 % (ref 42–52)
HCV AB SERPL QL IA: NONREACTIVE
HDLC SERPL-MCNC: 33 MG/DL (ref 35–60)
HGB BLD-MCNC: 15.7 GM/DL (ref 14–18)
HYALINE CASTS #/AREA URNS LPF: ABNORMAL /LPF
IMM GRANULOCYTES # BLD AUTO: 0 X10(3)/MCL (ref 0–0.04)
IMM GRANULOCYTES NFR BLD AUTO: 0 %
KETONES UR QL STRIP.AUTO: NEGATIVE MG/DL
LDLC SERPL CALC-MCNC: 128 MG/DL (ref 50–140)
LEUKOCYTE ESTERASE UR QL STRIP.AUTO: NEGATIVE UNIT/L
LYMPHOCYTES # BLD AUTO: 2.21 X10(3)/MCL (ref 0.6–4.6)
LYMPHOCYTES NFR BLD AUTO: 39.9 %
MCH RBC QN AUTO: 29.4 PG
MCHC RBC AUTO-ENTMCNC: 34.7 MG/DL (ref 33–36)
MCV RBC AUTO: 84.8 FL (ref 80–94)
MONOCYTES # BLD AUTO: 0.42 X10(3)/MCL (ref 0.1–1.3)
MONOCYTES NFR BLD AUTO: 7.6 %
MUCOUS THREADS URNS QL MICRO: ABNORMAL /LPF
N GONORRHOEA DNA SPEC QL NAA+PROBE: NOT DETECTED
NEUTROPHILS # BLD AUTO: 2.78 X10(3)/MCL (ref 2.1–9.2)
NEUTROPHILS NFR BLD AUTO: 50.2 %
NITRITE UR QL STRIP.AUTO: NEGATIVE
NRBC BLD AUTO-RTO: 0 %
PH UR STRIP.AUTO: 5.5 [PH]
PLATELET # BLD AUTO: 195 X10(3)/MCL (ref 130–400)
PMV BLD AUTO: 12.4 FL (ref 7.4–10.4)
POTASSIUM SERPL-SCNC: 4.1 MMOL/L (ref 3.5–5.1)
PROT SERPL-MCNC: 7.5 GM/DL (ref 6.4–8.3)
PROT UR QL STRIP.AUTO: NEGATIVE MG/DL
RBC # BLD AUTO: 5.34 X10(6)/MCL (ref 4.7–6.1)
RBC #/AREA URNS AUTO: ABNORMAL /HPF
RBC UR QL AUTO: NEGATIVE UNIT/L
SODIUM SERPL-SCNC: 139 MMOL/L (ref 136–145)
SP GR UR STRIP.AUTO: 1.02
SQUAMOUS #/AREA URNS LPF: ABNORMAL /HPF
T PALLIDUM AB SER QL: NONREACTIVE
TRIGL SERPL-MCNC: 127 MG/DL (ref 34–140)
TSH SERPL-ACNC: 0.85 UIU/ML (ref 0.35–4.94)
UROBILINOGEN UR STRIP-ACNC: NORMAL MG/DL
VLDLC SERPL CALC-MCNC: 25 MG/DL
WBC # SPEC AUTO: 5.5 X10(3)/MCL (ref 4.5–11.5)
WBC #/AREA URNS AUTO: ABNORMAL /HPF

## 2023-01-19 PROCEDURE — 3078F PR MOST RECENT DIASTOLIC BLOOD PRESSURE < 80 MM HG: ICD-10-PCS | Mod: CPTII,,, | Performed by: NURSE PRACTITIONER

## 2023-01-19 PROCEDURE — 85025 COMPLETE CBC W/AUTO DIFF WBC: CPT | Performed by: NURSE PRACTITIONER

## 2023-01-19 PROCEDURE — 86480 TB TEST CELL IMMUN MEASURE: CPT | Performed by: NURSE PRACTITIONER

## 2023-01-19 PROCEDURE — 82306 VITAMIN D 25 HYDROXY: CPT | Performed by: NURSE PRACTITIONER

## 2023-01-19 PROCEDURE — 99214 OFFICE O/P EST MOD 30 MIN: CPT | Mod: S$PBB,,, | Performed by: NURSE PRACTITIONER

## 2023-01-19 PROCEDURE — 1159F MED LIST DOCD IN RCRD: CPT | Mod: CPTII,,, | Performed by: NURSE PRACTITIONER

## 2023-01-19 PROCEDURE — 90686 IIV4 VACC NO PRSV 0.5 ML IM: CPT | Mod: PBBFAC

## 2023-01-19 PROCEDURE — 84443 ASSAY THYROID STIM HORMONE: CPT | Performed by: NURSE PRACTITIONER

## 2023-01-19 PROCEDURE — 1160F RVW MEDS BY RX/DR IN RCRD: CPT | Mod: CPTII,,, | Performed by: NURSE PRACTITIONER

## 2023-01-19 PROCEDURE — 36415 COLL VENOUS BLD VENIPUNCTURE: CPT | Performed by: NURSE PRACTITIONER

## 2023-01-19 PROCEDURE — 3008F PR BODY MASS INDEX (BMI) DOCUMENTED: ICD-10-PCS | Mod: CPTII,,, | Performed by: NURSE PRACTITIONER

## 2023-01-19 PROCEDURE — 86360 T CELL ABSOLUTE COUNT/RATIO: CPT | Performed by: NURSE PRACTITIONER

## 2023-01-19 PROCEDURE — 3074F SYST BP LT 130 MM HG: CPT | Mod: CPTII,,, | Performed by: NURSE PRACTITIONER

## 2023-01-19 PROCEDURE — 86780 TREPONEMA PALLIDUM: CPT | Performed by: NURSE PRACTITIONER

## 2023-01-19 PROCEDURE — 86803 HEPATITIS C AB TEST: CPT | Performed by: NURSE PRACTITIONER

## 2023-01-19 PROCEDURE — 80061 LIPID PANEL: CPT | Performed by: NURSE PRACTITIONER

## 2023-01-19 PROCEDURE — 99213 OFFICE O/P EST LOW 20 MIN: CPT | Mod: PBBFAC | Performed by: NURSE PRACTITIONER

## 2023-01-19 PROCEDURE — 87591 N.GONORRHOEAE DNA AMP PROB: CPT | Performed by: NURSE PRACTITIONER

## 2023-01-19 PROCEDURE — 3078F DIAST BP <80 MM HG: CPT | Mod: CPTII,,, | Performed by: NURSE PRACTITIONER

## 2023-01-19 PROCEDURE — 80053 COMPREHEN METABOLIC PANEL: CPT | Performed by: NURSE PRACTITIONER

## 2023-01-19 PROCEDURE — 81001 URINALYSIS AUTO W/SCOPE: CPT | Performed by: NURSE PRACTITIONER

## 2023-01-19 PROCEDURE — 86361 T CELL ABSOLUTE COUNT: CPT | Performed by: NURSE PRACTITIONER

## 2023-01-19 PROCEDURE — 3074F PR MOST RECENT SYSTOLIC BLOOD PRESSURE < 130 MM HG: ICD-10-PCS | Mod: CPTII,,, | Performed by: NURSE PRACTITIONER

## 2023-01-19 PROCEDURE — 99214 PR OFFICE/OUTPT VISIT, EST, LEVL IV, 30-39 MIN: ICD-10-PCS | Mod: S$PBB,,, | Performed by: NURSE PRACTITIONER

## 2023-01-19 PROCEDURE — 3008F BODY MASS INDEX DOCD: CPT | Mod: CPTII,,, | Performed by: NURSE PRACTITIONER

## 2023-01-19 PROCEDURE — 1159F PR MEDICATION LIST DOCUMENTED IN MEDICAL RECORD: ICD-10-PCS | Mod: CPTII,,, | Performed by: NURSE PRACTITIONER

## 2023-01-19 PROCEDURE — 1160F PR REVIEW ALL MEDS BY PRESCRIBER/CLIN PHARMACIST DOCUMENTED: ICD-10-PCS | Mod: CPTII,,, | Performed by: NURSE PRACTITIONER

## 2023-01-19 PROCEDURE — 83036 HEMOGLOBIN GLYCOSYLATED A1C: CPT | Performed by: NURSE PRACTITIONER

## 2023-01-19 PROCEDURE — 87536 HIV-1 QUANT&REVRSE TRNSCRPJ: CPT | Performed by: NURSE PRACTITIONER

## 2023-01-19 RX ORDER — ERGOCALCIFEROL 1.25 MG/1
50000 CAPSULE ORAL
Qty: 12 CAPSULE | Refills: 1 | Status: SHIPPED | OUTPATIENT
Start: 2023-01-19 | End: 2023-05-22 | Stop reason: SDUPTHER

## 2023-01-19 RX ORDER — SILDENAFIL CITRATE 20 MG/1
60 TABLET ORAL
Qty: 30 TABLET | Refills: 2 | Status: SHIPPED | OUTPATIENT
Start: 2023-01-19 | End: 2023-05-08 | Stop reason: SDUPTHER

## 2023-01-19 RX ORDER — BICTEGRAVIR SODIUM, EMTRICITABINE, AND TENOFOVIR ALAFENAMIDE FUMARATE 50; 200; 25 MG/1; MG/1; MG/1
1 TABLET ORAL DAILY
Qty: 30 TABLET | Refills: 4 | Status: SHIPPED | OUTPATIENT
Start: 2023-01-19 | End: 2023-05-18

## 2023-01-19 RX ORDER — KETOCONAZOLE 20 MG/ML
SHAMPOO, SUSPENSION TOPICAL
Qty: 120 ML | Refills: 1 | Status: SHIPPED | OUTPATIENT
Start: 2023-01-19 | End: 2023-05-22 | Stop reason: SDUPTHER

## 2023-01-19 NOTE — PROGRESS NOTES
Subjective:       Patient ID: Rafi Aguillon Jr. is a 42 y.o. male.    Chief Complaint: followup HIV    1/19/23  Rafi is a 43 yo AAM here today for HIV f/u visit.  He is taking Biktarvy daily & is virally suppressed.  Labs 8/1/22 VL UD, CD4 1146, RPR NR.  He is requesting refills on Vitamin D, taking weekly.  He tells me that he has been noting recurrent flaking and dry skin to face & scalp.  Appreciates treatment for same.  Amenable to flu vaccine today.  Sildenafil 60 mg effective for ED, needs refills.  All questions answered & concerns addressed.     8/1/22  Rafi is a 43 yo AAM presenting today for HIV f/u visit.  He reports >90% adherent to Biktarvy, tolerates well with viral suppression.  He takes ergocalciferol sporadically, will take a dose today.  He has plenty of paroxetine & amitriptyline at home, does not use regularly as prescribed.  Mood is up & down.  Adherence counseling provided. He states that sildenafil worked initially for ED at 40 mg dose, but does not find it to be very effective any longer.  Will try 60 mg dose, but no more than 3 tabs max.  Voiced understanding. He is not yet vaccinated against COVID, but tells me that he has been infected 3 times.  States that he will get the vaccination at his local pharmacy. He denies any new sexual partners. He has no questions or concerns today.     3/29/22  Rafi is a 40 yo AAM presents today for HIV f/u visit.  He reports 100% adherent to Biktarvy, tolerates well with viral suppression.  Last labs 11/21 VL <20, Cd4 657.  He has not noted any side effects since changing to Biktarvy.  He tells me that the Paxil was working well for anxiety & depression, but has fallen off of taking it.  He has medication at home, will restart.  He also states that he is not using amitriptyline, will resume for sleep but does not need refills at this time.  He is in a monogamous relationship x 1.5 years. Tells me that he uses condoms & appreciates refill for sildenafil  which is effective for ED.  He has not been taking vitamin D replacement consistently, will resume with Q weekly dosing.  All questions answered & concerns addressed.    Review of Systems   Constitutional: Negative.    HENT: Negative.     Respiratory: Negative.     Cardiovascular: Negative.    Gastrointestinal: Negative.    Genitourinary: Negative.    Integumentary:  Negative.   Neurological: Negative.    Hematological: Negative.    Psychiatric/Behavioral: Negative.         Objective:      Physical Exam  Vitals reviewed.   Constitutional:       General: He is not in acute distress.     Appearance: Normal appearance. He is not toxic-appearing.   HENT:      Mouth/Throat:      Mouth: Mucous membranes are moist.      Pharynx: Oropharynx is clear.   Eyes:      Conjunctiva/sclera: Conjunctivae normal.   Cardiovascular:      Rate and Rhythm: Normal rate and regular rhythm.   Pulmonary:      Effort: Pulmonary effort is normal. No respiratory distress.      Breath sounds: Normal breath sounds.   Abdominal:      General: Abdomen is flat. Bowel sounds are normal.      Palpations: Abdomen is soft.   Musculoskeletal:         General: Normal range of motion.      Cervical back: Normal range of motion.   Lymphadenopathy:      Cervical: No cervical adenopathy.   Skin:     General: Skin is warm and dry.   Neurological:      General: No focal deficit present.      Mental Status: He is alert and oriented to person, place, and time. Mental status is at baseline.   Psychiatric:         Mood and Affect: Mood normal.         Behavior: Behavior normal.       Assessment:       Problem List Items Addressed This Visit    None      Plan:           HIV disease  -     BIKTARVY -25 mg (25 kg or greater); Take 1 tablet by mouth once daily.  Dispense: 30 tablet; Refill: 4  -     HIV-1 RNA, Quantitative, PCR with Reflex to Genotype; Future; Expected date: 01/19/2023  -     CD4 T-Matewan Cells; Future; Expected date: 01/19/2023  -      Urinalysis; Future; Expected date: 01/19/2023  -     TSH; Future; Expected date: 01/19/2023  -     Hemoglobin A1C; Future; Expected date: 01/19/2023  -     Hepatitis C Antibody; Future; Expected date: 01/19/2023  -     SYPHILIS ANTIBODY (WITH REFLEX RPR); Future; Expected date: 01/19/2023  -     Lipid Panel; Future; Expected date: 01/19/2023  -     Chlamydia/GC, PCR; Future; Expected date: 01/19/2023  -     Comprehensive Metabolic Panel; Future; Expected date: 01/19/2023  -     CBC Auto Differential; Future; Expected date: 01/19/2023  -     Quantiferon Gold TB; Future; Expected date: 01/19/2023  -     CD4 Lymphocytes; Future; Expected date: 01/19/2023  Adherence and sexual health counseling done.  Use condoms for all sexual encounters.  Blood precautions.   Continue Biktarvy as prescribed.  Labs today.  RTC 4 months with Cindy.     HIV Wellness:  Anal pap: 7/21 NIL  Oral CT/GC: 11/19 Neg  Anal CT/GC: 11/19 Neg  Urine CT/GC: 1/23  RPR: 8/22 NR, 1/23  Ophth: 1/21     Vitamin D deficiency  -     ergocalciferol (ERGOCALCIFEROL) 50,000 unit Cap; Take 1 capsule (50,000 Units total) by mouth every 14 (fourteen) days.  Dispense: 12 capsule; Refill: 1  -     Vitamin D; Future; Expected date: 01/19/2023  -     CD4 Lymphocytes; Future; Expected date: 01/19/2023  Continue Ergocalciferol 14381 units every other week.    Seborrhea  -     ketoconazole (NIZORAL) 2 % shampoo; Apply topically twice a week.  Dispense: 120 mL; Refill: 1  -     CD4 Lymphocytes; Future; Expected date: 01/19/2023  Ketoconazole 2% shampoo 2-3 times per week.   Keep moisturized.    HSV (herpes simplex virus) infection  -     CD4 Lymphocytes; Future; Expected date: 01/19/2023  Acyclovir PRN outbreaks, does not need refills at this time.    Erectile dysfunction, unspecified erectile dysfunction type  -     sildenafil (REVATIO) 20 mg Tab; Take 3 tablets (60 mg total) by mouth as needed (ED).  Dispense: 30 tablet; Refill: 2  -     CD4 Lymphocytes; Future;  Expected date: 01/19/2023  Sildenafil 20 mg 2-3 tabs PRN Erectile Dysfunction.  Use with condoms.  Do not take more than every 3 days.  ED precautions.    Need for vaccination  -     Influenza - Quadrivalent (PF)  -     CD4 Lymphocytes; Future; Expected date: 01/19/2023  Flu vaccine today.

## 2023-01-20 LAB
AGE: 44
CD3+CD4+ CELLS # BLD: 40 % (ref 28–48)
CD3+CD4+ CELLS # SPEC: 1117.6 UNIT/L (ref 589–1505)
CD3+CD4+ CELLS NFR BLD: 50.8 %
LYMPHOCYTES # BLD AUTO: 2200 X10(3)/MCL (ref 1260–5520)
LYMPHOMA - T-CELL MARKERS SPEC-IMP: NORMAL
WBC # BLD AUTO: 5500 /MM3 (ref 4500–11500)

## 2023-01-21 LAB — HIV1 RNA # PLAS NAA DL=20: NORMAL COPIES/ML

## 2023-01-22 LAB
GAMMA INTERFERON BACKGROUND BLD IA-ACNC: 0.01 IU/ML
M TB IFN-G BLD-IMP: NEGATIVE
M TB IFN-G CD4+ BCKGRND COR BLD-ACNC: 0 IU/ML
M TB IFN-G CD4+CD8+ BCKGRND COR BLD-ACNC: 0 IU/ML
MITOGEN IGNF BCKGRD COR BLD-ACNC: >10 IU/ML

## 2023-05-01 ENCOUNTER — TELEPHONE (OUTPATIENT)
Dept: INFECTIOUS DISEASES | Facility: CLINIC | Age: 43
End: 2023-05-01
Payer: MEDICAID

## 2023-05-01 DIAGNOSIS — N52.9 ERECTILE DYSFUNCTION, UNSPECIFIED ERECTILE DYSFUNCTION TYPE: Primary | ICD-10-CM

## 2023-05-01 RX ORDER — SILDENAFIL CITRATE 20 MG/1
60 TABLET ORAL
Qty: 30 TABLET | Refills: 2 | OUTPATIENT
Start: 2023-05-01

## 2023-05-01 NOTE — TELEPHONE ENCOUNTER
Last visit with Cindy Moreno, APRN: 1/19/2023  Last visit in [facility] INFECTIOUS DISEASE: 1/19/2023    Patient's next visit in [facility] INFECTIOUS DISEASE: 5/22/2023     Last labs 01/19/2023. Please advise on Med refill.

## 2023-05-01 NOTE — TELEPHONE ENCOUNTER
----- Message from Edyta Ashley sent at 5/1/2023 11:49 AM CDT -----  Regarding: med refill  Cindy    Asking for refill on:  sildenafil (REVATIO) 20 mg Tab 30 tablet 2 1/19/2023   Sig: Take 3 tablets (60 mg total) by mouth as needed (ED).    Sent to:  [id number] - [place] - [address] [place] [address]  Phone: [phone] Fax: [phone]

## 2023-05-05 ENCOUNTER — TELEPHONE (OUTPATIENT)
Dept: INFECTIOUS DISEASES | Facility: CLINIC | Age: 43
End: 2023-05-05
Payer: MEDICAID

## 2023-05-05 DIAGNOSIS — N52.9 ERECTILE DYSFUNCTION, UNSPECIFIED ERECTILE DYSFUNCTION TYPE: ICD-10-CM

## 2023-05-05 NOTE — TELEPHONE ENCOUNTER
----- Message from Josefina Altamirano sent at 5/5/2023 10:11 AM CDT -----  Regarding: Sukhjinder White pt       Pt called to follow up on the refill request for sildenafil (REVATIO) 20 mg Tab  Please call back@ [phone]

## 2023-05-05 NOTE — TELEPHONE ENCOUNTER
Phoned patient. Informed that refill was denied from 05/03/2023 Telephone encounter. To check with provider on Monday regarding denial. Understanding achieved. Please advise.

## 2023-05-08 RX ORDER — SILDENAFIL CITRATE 20 MG/1
60 TABLET ORAL
Qty: 30 TABLET | Refills: 0 | Status: SHIPPED | OUTPATIENT
Start: 2023-05-08 | End: 2023-05-22 | Stop reason: SDUPTHER

## 2023-05-08 NOTE — TELEPHONE ENCOUNTER
Further reviewed last note.  He did only have 3 fills total for 4 month interval.  Will authorize one refill. Keep appointment as scheduled.

## 2023-05-08 NOTE — TELEPHONE ENCOUNTER
Phoned patient. Notified of additional refill. Encouraged attending upcoming appt. Understanding verbalized. Appreciates refill and call.

## 2023-05-18 DIAGNOSIS — B20 HIV DISEASE: ICD-10-CM

## 2023-05-18 RX ORDER — BICTEGRAVIR SODIUM, EMTRICITABINE, AND TENOFOVIR ALAFENAMIDE FUMARATE 50; 200; 25 MG/1; MG/1; MG/1
TABLET ORAL
Qty: 30 TABLET | Refills: 0 | Status: SHIPPED | OUTPATIENT
Start: 2023-05-18 | End: 2023-05-22 | Stop reason: SDUPTHER

## 2023-05-22 ENCOUNTER — OFFICE VISIT (OUTPATIENT)
Dept: INFECTIOUS DISEASES | Facility: CLINIC | Age: 43
End: 2023-05-22
Payer: MEDICAID

## 2023-05-22 VITALS
RESPIRATION RATE: 16 BRPM | HEIGHT: 74 IN | TEMPERATURE: 98 F | SYSTOLIC BLOOD PRESSURE: 108 MMHG | WEIGHT: 221 LBS | DIASTOLIC BLOOD PRESSURE: 75 MMHG | BODY MASS INDEX: 28.36 KG/M2 | HEART RATE: 62 BPM

## 2023-05-22 DIAGNOSIS — L21.9 SEBORRHEA: ICD-10-CM

## 2023-05-22 DIAGNOSIS — B20 HIV DISEASE: Primary | ICD-10-CM

## 2023-05-22 DIAGNOSIS — N52.9 ERECTILE DYSFUNCTION, UNSPECIFIED ERECTILE DYSFUNCTION TYPE: ICD-10-CM

## 2023-05-22 DIAGNOSIS — Z11.3 ROUTINE SCREENING FOR STI (SEXUALLY TRANSMITTED INFECTION): ICD-10-CM

## 2023-05-22 DIAGNOSIS — E55.9 VITAMIN D DEFICIENCY: ICD-10-CM

## 2023-05-22 LAB
ALBUMIN SERPL-MCNC: 4.3 G/DL (ref 3.5–5)
ALBUMIN/GLOB SERPL: 1.3 RATIO (ref 1.1–2)
ALP SERPL-CCNC: 51 UNIT/L (ref 40–150)
ALT SERPL-CCNC: 20 UNIT/L (ref 0–55)
AST SERPL-CCNC: 19 UNIT/L (ref 5–34)
BASOPHILS # BLD AUTO: 0.05 X10(3)/MCL
BASOPHILS NFR BLD AUTO: 1 %
BILIRUBIN DIRECT+TOT PNL SERPL-MCNC: 0.6 MG/DL
BUN SERPL-MCNC: 15 MG/DL (ref 8.9–20.6)
C TRACH DNA SPEC QL NAA+PROBE: NOT DETECTED
CALCIUM SERPL-MCNC: 9.5 MG/DL (ref 8.4–10.2)
CHLORIDE SERPL-SCNC: 106 MMOL/L (ref 98–107)
CO2 SERPL-SCNC: 28 MMOL/L (ref 22–29)
CREAT SERPL-MCNC: 1.14 MG/DL (ref 0.73–1.18)
EOSINOPHIL # BLD AUTO: 0.11 X10(3)/MCL (ref 0–0.9)
EOSINOPHIL NFR BLD AUTO: 2.2 %
ERYTHROCYTE [DISTWIDTH] IN BLOOD BY AUTOMATED COUNT: 13.2 % (ref 11.5–17)
GFR SERPLBLD CREATININE-BSD FMLA CKD-EPI: >60 MLS/MIN/1.73/M2
GLOBULIN SER-MCNC: 3.4 GM/DL (ref 2.4–3.5)
GLUCOSE SERPL-MCNC: 89 MG/DL (ref 74–100)
HCT VFR BLD AUTO: 47.8 % (ref 42–52)
HGB BLD-MCNC: 15.1 G/DL (ref 14–18)
IMM GRANULOCYTES # BLD AUTO: 0.01 X10(3)/MCL (ref 0–0.04)
IMM GRANULOCYTES NFR BLD AUTO: 0.2 %
LYMPHOCYTES # BLD AUTO: 2.37 X10(3)/MCL (ref 0.6–4.6)
LYMPHOCYTES NFR BLD AUTO: 46.6 %
MCH RBC QN AUTO: 28.5 PG (ref 27–31)
MCHC RBC AUTO-ENTMCNC: 31.6 G/DL (ref 33–36)
MCV RBC AUTO: 90.4 FL (ref 80–94)
MONOCYTES # BLD AUTO: 0.42 X10(3)/MCL (ref 0.1–1.3)
MONOCYTES NFR BLD AUTO: 8.3 %
N GONORRHOEA DNA SPEC QL NAA+PROBE: NOT DETECTED
NEUTROPHILS # BLD AUTO: 2.13 X10(3)/MCL (ref 2.1–9.2)
NEUTROPHILS NFR BLD AUTO: 41.7 %
NRBC BLD AUTO-RTO: 0 %
PLATELET # BLD AUTO: 206 X10(3)/MCL (ref 130–400)
PMV BLD AUTO: 12.4 FL (ref 7.4–10.4)
POTASSIUM SERPL-SCNC: 4 MMOL/L (ref 3.5–5.1)
PROT SERPL-MCNC: 7.7 GM/DL (ref 6.4–8.3)
RBC # BLD AUTO: 5.29 X10(6)/MCL (ref 4.7–6.1)
SODIUM SERPL-SCNC: 139 MMOL/L (ref 136–145)
T PALLIDUM AB SER QL: NONREACTIVE
WBC # SPEC AUTO: 5.09 X10(3)/MCL (ref 4.5–11.5)

## 2023-05-22 PROCEDURE — 1160F PR REVIEW ALL MEDS BY PRESCRIBER/CLIN PHARMACIST DOCUMENTED: ICD-10-PCS | Mod: CPTII,,, | Performed by: NURSE PRACTITIONER

## 2023-05-22 PROCEDURE — 87536 HIV-1 QUANT&REVRSE TRNSCRPJ: CPT | Mod: 90 | Performed by: NURSE PRACTITIONER

## 2023-05-22 PROCEDURE — 1159F MED LIST DOCD IN RCRD: CPT | Mod: CPTII,,, | Performed by: NURSE PRACTITIONER

## 2023-05-22 PROCEDURE — 3078F DIAST BP <80 MM HG: CPT | Mod: CPTII,,, | Performed by: NURSE PRACTITIONER

## 2023-05-22 PROCEDURE — 3008F BODY MASS INDEX DOCD: CPT | Mod: CPTII,,, | Performed by: NURSE PRACTITIONER

## 2023-05-22 PROCEDURE — 80053 COMPREHEN METABOLIC PANEL: CPT | Performed by: NURSE PRACTITIONER

## 2023-05-22 PROCEDURE — 1159F PR MEDICATION LIST DOCUMENTED IN MEDICAL RECORD: ICD-10-PCS | Mod: CPTII,,, | Performed by: NURSE PRACTITIONER

## 2023-05-22 PROCEDURE — 85025 COMPLETE CBC W/AUTO DIFF WBC: CPT | Performed by: NURSE PRACTITIONER

## 2023-05-22 PROCEDURE — 1160F RVW MEDS BY RX/DR IN RCRD: CPT | Mod: CPTII,,, | Performed by: NURSE PRACTITIONER

## 2023-05-22 PROCEDURE — 3078F PR MOST RECENT DIASTOLIC BLOOD PRESSURE < 80 MM HG: ICD-10-PCS | Mod: CPTII,,, | Performed by: NURSE PRACTITIONER

## 2023-05-22 PROCEDURE — 87591 N.GONORRHOEAE DNA AMP PROB: CPT | Performed by: NURSE PRACTITIONER

## 2023-05-22 PROCEDURE — 3008F PR BODY MASS INDEX (BMI) DOCUMENTED: ICD-10-PCS | Mod: CPTII,,, | Performed by: NURSE PRACTITIONER

## 2023-05-22 PROCEDURE — 99214 PR OFFICE/OUTPT VISIT, EST, LEVL IV, 30-39 MIN: ICD-10-PCS | Mod: S$PBB,,, | Performed by: NURSE PRACTITIONER

## 2023-05-22 PROCEDURE — 3074F SYST BP LT 130 MM HG: CPT | Mod: CPTII,,, | Performed by: NURSE PRACTITIONER

## 2023-05-22 PROCEDURE — 36415 COLL VENOUS BLD VENIPUNCTURE: CPT | Performed by: NURSE PRACTITIONER

## 2023-05-22 PROCEDURE — 99213 OFFICE O/P EST LOW 20 MIN: CPT | Mod: PBBFAC | Performed by: NURSE PRACTITIONER

## 2023-05-22 PROCEDURE — 86780 TREPONEMA PALLIDUM: CPT | Performed by: NURSE PRACTITIONER

## 2023-05-22 PROCEDURE — 3074F PR MOST RECENT SYSTOLIC BLOOD PRESSURE < 130 MM HG: ICD-10-PCS | Mod: CPTII,,, | Performed by: NURSE PRACTITIONER

## 2023-05-22 PROCEDURE — 99214 OFFICE O/P EST MOD 30 MIN: CPT | Mod: S$PBB,,, | Performed by: NURSE PRACTITIONER

## 2023-05-22 RX ORDER — BICTEGRAVIR SODIUM, EMTRICITABINE, AND TENOFOVIR ALAFENAMIDE FUMARATE 50; 200; 25 MG/1; MG/1; MG/1
1 TABLET ORAL DAILY
Qty: 90 TABLET | Refills: 1 | Status: SHIPPED | OUTPATIENT
Start: 2023-05-22 | End: 2023-09-25 | Stop reason: SDUPTHER

## 2023-05-22 RX ORDER — SILDENAFIL CITRATE 20 MG/1
60 TABLET ORAL
Qty: 30 TABLET | Refills: 4 | Status: SHIPPED | OUTPATIENT
Start: 2023-05-22 | End: 2023-05-22 | Stop reason: SDUPTHER

## 2023-05-22 RX ORDER — KETOCONAZOLE 20 MG/ML
SHAMPOO, SUSPENSION TOPICAL
Qty: 120 ML | Refills: 1 | Status: SHIPPED | OUTPATIENT
Start: 2023-05-22 | End: 2024-01-23 | Stop reason: SDUPTHER

## 2023-05-22 RX ORDER — ERGOCALCIFEROL 1.25 MG/1
50000 CAPSULE ORAL
Qty: 12 CAPSULE | Refills: 1 | Status: SHIPPED | OUTPATIENT
Start: 2023-05-22 | End: 2024-01-23 | Stop reason: SDUPTHER

## 2023-05-22 RX ORDER — SILDENAFIL CITRATE 20 MG/1
60 TABLET ORAL
Qty: 30 TABLET | Refills: 4 | Status: SHIPPED | OUTPATIENT
Start: 2023-05-22 | End: 2023-09-25 | Stop reason: SDUPTHER

## 2023-05-22 NOTE — PROGRESS NOTES
Subjective     Patient ID: Rafi Aguillon Jr. is a 43 y.o. male.    Chief Complaint: Followup HIV (Questions sinus infection)    HPI  Review of Systems       Objective     Physical Exam       Assessment and Plan     Problem List Items Addressed This Visit    None      ***

## 2023-05-22 NOTE — PROGRESS NOTES
Subjective     Patient ID: Rfai Aguillon Jr. is a 43 y.o. male.    Chief Complaint: Followup HIV (Questions sinus infection)    5/22/23  Rafi is a 42 yo AAM here today for HIV f/u visit.  He takes Biktarvy daily and is tolerating it well.  Labs 1/19/23 VL UD, CD4 1118, RPR NR, CT/GC negative.  He is not currently taking vitamin D, states did not receive with medication delivery.  Will send new refill.  He states that ketoconazole shampoo is effective for seborrhea, recurring now & appreciates refill. Sildenafil effective for ED, requests refill to [facility].  No new sexual partners, but is sexually active. Will screen for STI, voiced appreciation.  All questions answered & concerns addressed.    1/19/23  Rafi is a 43 yo AAM here today for HIV f/u visit.  He is taking Biktarvy daily & is virally suppressed.  Labs 8/1/22 VL UD, CD4 1146, RPR NR.  He is requesting refills on Vitamin D, taking weekly.  He tells me that he has been noting recurrent flaking and dry skin to face & scalp.  Appreciates treatment for same.  Amenable to flu vaccine today.  Sildenafil 60 mg effective for ED, needs refills.  All questions answered & concerns addressed.      8/1/22  Rafi is a 43 yo AAM presenting today for HIV f/u visit.  He reports >90% adherent to Biktarvy, tolerates well with viral suppression.  He takes ergocalciferol sporadically, will take a dose today.  He has plenty of paroxetine & amitriptyline at home, does not use regularly as prescribed.  Mood is up & down.  Adherence counseling provided. He states that sildenafil worked initially for ED at 40 mg dose, but does not find it to be very effective any longer.  Will try 60 mg dose, but no more than 3 tabs max.  Voiced understanding. He is not yet vaccinated against COVID, but tells me that he has been infected 3 times.  States that he will get the vaccination at his local pharmacy. He denies any new sexual partners. He has no questions or concerns today.    Review of  Systems   Constitutional: Negative.    HENT: Negative.     Respiratory: Negative.     Cardiovascular: Negative.    Gastrointestinal: Negative.    Genitourinary: Negative.    Integumentary:  Negative.   Neurological: Negative.    Hematological: Negative.    Psychiatric/Behavioral: Negative.          Objective     Physical Exam  Vitals reviewed.   Constitutional:       General: He is not in acute distress.     Appearance: Normal appearance. He is not toxic-appearing.   HENT:      Mouth/Throat:      Mouth: Mucous membranes are moist.      Pharynx: Oropharynx is clear.   Eyes:      Conjunctiva/sclera: Conjunctivae normal.   Cardiovascular:      Rate and Rhythm: Normal rate and regular rhythm.   Pulmonary:      Effort: Pulmonary effort is normal. No respiratory distress.      Breath sounds: Normal breath sounds.   Abdominal:      General: Abdomen is flat. Bowel sounds are normal.      Palpations: Abdomen is soft.   Musculoskeletal:         General: Normal range of motion.      Cervical back: Normal range of motion.   Lymphadenopathy:      Cervical: No cervical adenopathy.   Skin:     General: Skin is warm and dry.   Neurological:      General: No focal deficit present.      Mental Status: He is alert and oriented to person, place, and time. Mental status is at baseline.   Psychiatric:         Mood and Affect: Mood normal.         Behavior: Behavior normal.          Assessment and Plan     Problem List Items Addressed This Visit          Renal/    Erectile dysfunction    Relevant Medications    sildenafil (REVATIO) 20 mg Tab       Endocrine    Vitamin D deficiency    Relevant Medications    ergocalciferol (ERGOCALCIFEROL) 50,000 unit Cap     Other Visit Diagnoses       HIV disease    -  Primary    Relevant Medications    lwqxznlxm-uoejryta-vcljqnr ala (BIKTARVY) -25 mg (25 kg or greater)    Other Relevant Orders    CBC Auto Differential    Comprehensive Metabolic Panel    HIV-1 RNA, Quantitative, PCR with Reflex  to Genotype    Seborrhea        Relevant Medications    ketoconazole (NIZORAL) 2 % shampoo    Routine screening for STI (sexually transmitted infection)        Relevant Orders    SYPHILIS ANTIBODY (WITH REFLEX RPR)    Chlamydia/GC, PCR            HIV disease  -     fmjiqsivl-ogagxdmj-vcjdfqb ala (BIKTARVY) -25 mg (25 kg or greater); Take 1 tablet by mouth once daily.  Dispense: 90 tablet; Refill: 1  -     CBC Auto Differential; Future; Expected date: 05/22/2023  -     Comprehensive Metabolic Panel  -     HIV-1 RNA, Quantitative, PCR with Reflex to Genotype; Future; Expected date: 05/22/2023  Adherence and sexual health counseling done.  Use condoms for all sexual encounters.  Blood precautions.   Continue Biktarvy as prescribed.  Labs today.  RTC 4 months with Cindy.     HIV Wellness:  Anal pap: 7/21 NIL  Oral CT/GC: 11/19 Neg  Anal CT/GC: 11/19 Neg  Urine CT/GC: 1/23 Neg, 5/23  RPR:  1/23 NR, 5/23  Ophth: 1/21     Vitamin D deficiency  -     ergocalciferol (ERGOCALCIFEROL) 50,000 unit Cap; Take 1 capsule (50,000 Units total) by mouth every 7 days.  Dispense: 12 capsule; Refill: 1  Ergocalciferol 85867 units weekly.     Seborrhea  -     ketoconazole (NIZORAL) 2 % shampoo; Apply topically twice a week.  Dispense: 120 mL; Refill: 1  Ketoconazole 2% shampoo 2-3 times per week.   Keep moisturized.    Erectile dysfunction, unspecified erectile dysfunction type  -     Discontinue: sildenafil (REVATIO) 20 mg Tab; Take 3 tablets (60 mg total) by mouth as needed (ED).  Dispense: 30 tablet; Refill: 4  -     sildenafil (REVATIO) 20 mg Tab; Take 3 tablets (60 mg total) by mouth as needed (ED).  Dispense: 30 tablet; Refill: 4  Sildenafil 20 mg 2-3 tabs PRN Erectile Dysfunction.  Use with condoms.  Do not take more than every 3 days.  ED precautions.    Routine screening for STI (sexually transmitted infection)  -     SYPHILIS ANTIBODY (WITH REFLEX RPR); Future; Expected date: 05/22/2023  -     Chlamydia/GC, PCR  RPR and  urine for CT/GC today.   Condom use encouraged.

## 2023-05-24 LAB — HIV1 RNA # PLAS NAA DL=20: NORMAL COPIES/ML

## 2023-09-25 ENCOUNTER — OFFICE VISIT (OUTPATIENT)
Dept: INFECTIOUS DISEASES | Facility: CLINIC | Age: 43
End: 2023-09-25
Payer: MEDICAID

## 2023-09-25 VITALS
DIASTOLIC BLOOD PRESSURE: 77 MMHG | BODY MASS INDEX: 28.88 KG/M2 | SYSTOLIC BLOOD PRESSURE: 111 MMHG | HEART RATE: 69 BPM | RESPIRATION RATE: 18 BRPM | WEIGHT: 225 LBS | HEIGHT: 74 IN | TEMPERATURE: 98 F

## 2023-09-25 DIAGNOSIS — Z12.11 COLON CANCER SCREENING: ICD-10-CM

## 2023-09-25 DIAGNOSIS — N52.9 ERECTILE DYSFUNCTION, UNSPECIFIED ERECTILE DYSFUNCTION TYPE: ICD-10-CM

## 2023-09-25 DIAGNOSIS — B00.9 HSV (HERPES SIMPLEX VIRUS) INFECTION: ICD-10-CM

## 2023-09-25 DIAGNOSIS — B20 HIV DISEASE: Primary | ICD-10-CM

## 2023-09-25 DIAGNOSIS — Z23 NEED FOR VACCINATION: ICD-10-CM

## 2023-09-25 LAB
ALBUMIN SERPL-MCNC: 4.3 G/DL (ref 3.5–5)
ALBUMIN/GLOB SERPL: 1.3 RATIO (ref 1.1–2)
ALP SERPL-CCNC: 53 UNIT/L (ref 40–150)
ALT SERPL-CCNC: 21 UNIT/L (ref 0–55)
AST SERPL-CCNC: 23 UNIT/L (ref 5–34)
BASOPHILS # BLD AUTO: 0.05 X10(3)/MCL
BASOPHILS NFR BLD AUTO: 1 %
BILIRUB SERPL-MCNC: 0.5 MG/DL
BUN SERPL-MCNC: 16.3 MG/DL (ref 8.9–20.6)
CALCIUM SERPL-MCNC: 9.5 MG/DL (ref 8.4–10.2)
CHLORIDE SERPL-SCNC: 106 MMOL/L (ref 98–107)
CO2 SERPL-SCNC: 27 MMOL/L (ref 22–29)
CREAT SERPL-MCNC: 1.18 MG/DL (ref 0.73–1.18)
EOSINOPHIL # BLD AUTO: 0.21 X10(3)/MCL (ref 0–0.9)
EOSINOPHIL NFR BLD AUTO: 4.3 %
ERYTHROCYTE [DISTWIDTH] IN BLOOD BY AUTOMATED COUNT: 13.5 % (ref 11.5–17)
GFR SERPLBLD CREATININE-BSD FMLA CKD-EPI: >60 MLS/MIN/1.73/M2
GLOBULIN SER-MCNC: 3.2 GM/DL (ref 2.4–3.5)
GLUCOSE SERPL-MCNC: 93 MG/DL (ref 74–100)
HCT VFR BLD AUTO: 45.4 % (ref 42–52)
HGB BLD-MCNC: 15 G/DL (ref 14–18)
IMM GRANULOCYTES # BLD AUTO: 0.01 X10(3)/MCL (ref 0–0.04)
IMM GRANULOCYTES NFR BLD AUTO: 0.2 %
LYMPHOCYTES # BLD AUTO: 2.12 X10(3)/MCL (ref 0.6–4.6)
LYMPHOCYTES NFR BLD AUTO: 43.7 %
MCH RBC QN AUTO: 29.1 PG (ref 27–31)
MCHC RBC AUTO-ENTMCNC: 33 G/DL (ref 33–36)
MCV RBC AUTO: 88 FL (ref 80–94)
MONOCYTES # BLD AUTO: 0.43 X10(3)/MCL (ref 0.1–1.3)
MONOCYTES NFR BLD AUTO: 8.9 %
NEUTROPHILS # BLD AUTO: 2.03 X10(3)/MCL (ref 2.1–9.2)
NEUTROPHILS NFR BLD AUTO: 41.9 %
NRBC BLD AUTO-RTO: 0 %
PLATELET # BLD AUTO: 211 X10(3)/MCL (ref 130–400)
PMV BLD AUTO: 12.2 FL (ref 7.4–10.4)
POTASSIUM SERPL-SCNC: 4.2 MMOL/L (ref 3.5–5.1)
PROT SERPL-MCNC: 7.5 GM/DL (ref 6.4–8.3)
RBC # BLD AUTO: 5.16 X10(6)/MCL (ref 4.7–6.1)
SODIUM SERPL-SCNC: 140 MMOL/L (ref 136–145)
WBC # SPEC AUTO: 4.85 X10(3)/MCL (ref 4.5–11.5)

## 2023-09-25 PROCEDURE — 99214 PR OFFICE/OUTPT VISIT, EST, LEVL IV, 30-39 MIN: ICD-10-PCS | Mod: S$PBB,,, | Performed by: NURSE PRACTITIONER

## 2023-09-25 PROCEDURE — 86361 T CELL ABSOLUTE COUNT: CPT | Performed by: NURSE PRACTITIONER

## 2023-09-25 PROCEDURE — 1160F PR REVIEW ALL MEDS BY PRESCRIBER/CLIN PHARMACIST DOCUMENTED: ICD-10-PCS | Mod: CPTII,,, | Performed by: NURSE PRACTITIONER

## 2023-09-25 PROCEDURE — 1160F RVW MEDS BY RX/DR IN RCRD: CPT | Mod: CPTII,,, | Performed by: NURSE PRACTITIONER

## 2023-09-25 PROCEDURE — 99213 OFFICE O/P EST LOW 20 MIN: CPT | Mod: PBBFAC | Performed by: NURSE PRACTITIONER

## 2023-09-25 PROCEDURE — 3078F DIAST BP <80 MM HG: CPT | Mod: CPTII,,, | Performed by: NURSE PRACTITIONER

## 2023-09-25 PROCEDURE — 3008F PR BODY MASS INDEX (BMI) DOCUMENTED: ICD-10-PCS | Mod: CPTII,,, | Performed by: NURSE PRACTITIONER

## 2023-09-25 PROCEDURE — 3078F PR MOST RECENT DIASTOLIC BLOOD PRESSURE < 80 MM HG: ICD-10-PCS | Mod: CPTII,,, | Performed by: NURSE PRACTITIONER

## 2023-09-25 PROCEDURE — 3074F PR MOST RECENT SYSTOLIC BLOOD PRESSURE < 130 MM HG: ICD-10-PCS | Mod: CPTII,,, | Performed by: NURSE PRACTITIONER

## 2023-09-25 PROCEDURE — 36415 COLL VENOUS BLD VENIPUNCTURE: CPT | Performed by: NURSE PRACTITIONER

## 2023-09-25 PROCEDURE — 80053 COMPREHEN METABOLIC PANEL: CPT | Performed by: NURSE PRACTITIONER

## 2023-09-25 PROCEDURE — 3008F BODY MASS INDEX DOCD: CPT | Mod: CPTII,,, | Performed by: NURSE PRACTITIONER

## 2023-09-25 PROCEDURE — 1159F PR MEDICATION LIST DOCUMENTED IN MEDICAL RECORD: ICD-10-PCS | Mod: CPTII,,, | Performed by: NURSE PRACTITIONER

## 2023-09-25 PROCEDURE — 3074F SYST BP LT 130 MM HG: CPT | Mod: CPTII,,, | Performed by: NURSE PRACTITIONER

## 2023-09-25 PROCEDURE — 3044F PR MOST RECENT HEMOGLOBIN A1C LEVEL <7.0%: ICD-10-PCS | Mod: CPTII,,, | Performed by: NURSE PRACTITIONER

## 2023-09-25 PROCEDURE — 90471 IMMUNIZATION ADMIN: CPT | Mod: PBBFAC

## 2023-09-25 PROCEDURE — 99214 OFFICE O/P EST MOD 30 MIN: CPT | Mod: S$PBB,,, | Performed by: NURSE PRACTITIONER

## 2023-09-25 PROCEDURE — 85025 COMPLETE CBC W/AUTO DIFF WBC: CPT | Performed by: NURSE PRACTITIONER

## 2023-09-25 PROCEDURE — 3044F HG A1C LEVEL LT 7.0%: CPT | Mod: CPTII,,, | Performed by: NURSE PRACTITIONER

## 2023-09-25 PROCEDURE — 87536 HIV-1 QUANT&REVRSE TRNSCRPJ: CPT | Performed by: NURSE PRACTITIONER

## 2023-09-25 PROCEDURE — 1159F MED LIST DOCD IN RCRD: CPT | Mod: CPTII,,, | Performed by: NURSE PRACTITIONER

## 2023-09-25 RX ORDER — SILDENAFIL CITRATE 20 MG/1
60 TABLET ORAL
Qty: 30 TABLET | Refills: 4 | Status: SHIPPED | OUTPATIENT
Start: 2023-09-25 | End: 2024-01-23 | Stop reason: SDUPTHER

## 2023-09-25 RX ORDER — ACYCLOVIR 400 MG/1
400 TABLET ORAL 2 TIMES DAILY PRN
Qty: 20 TABLET | Refills: 0 | Status: SHIPPED | OUTPATIENT
Start: 2023-09-25 | End: 2024-01-23 | Stop reason: SDUPTHER

## 2023-09-25 RX ORDER — BICTEGRAVIR SODIUM, EMTRICITABINE, AND TENOFOVIR ALAFENAMIDE FUMARATE 50; 200; 25 MG/1; MG/1; MG/1
1 TABLET ORAL DAILY
Qty: 90 TABLET | Refills: 1 | Status: SHIPPED | OUTPATIENT
Start: 2023-09-25 | End: 2024-01-23 | Stop reason: SDUPTHER

## 2023-09-25 NOTE — PROGRESS NOTES
Patient ID: Rafi Aguillon Jr. 43 y.o.     Chief Complaint:   Chief Complaint   Patient presents with    Followup HIV     States very tired the last 2 months        HPI:  9/25/23  Rafi is a 44 yo AAM presenting today for HIV f/u visit. He is taking Biktarvy daily with viral suppression. Labs 5/23 VL UD, 1/23 CD4 1118.  STI screenings negative 5/23, denies any new sexual partners and tells me that he is engaged to his youngest daughter's mother. Congratulations offered. Vitamin D level 14.3 1/23. He tells me that he continues to forget to take replacement. Encouraged him to take weekly as prescribed and he may find that repletion will increase energy levels. Pillbox provided to assist with same. Low dose sildenafil effective for ED, appreciates refill.  Not taking psych medications, states not needed. Amenable to flu vaccine today. Due for colon cancer screen. Denies family history of colon cancer or rectal bleeding. Amenable to cologuard as ordered and agrees to return sample. All questions answered & concerns addressed.    5/22/23  Rafi is a 44 yo AAM here today for HIV f/u visit.  He takes Biktarvy daily and is tolerating it well.  Labs 1/19/23 VL UD, CD4 1118, RPR NR, CT/GC negative.  He is not currently taking vitamin D, states did not receive with medication delivery.  Will send new refill.  He states that ketoconazole shampoo is effective for seborrhea, recurring now & appreciates refill. Sildenafil effective for ED, requests refill to [facility].  No new sexual partners, but is sexually active. Will screen for STI, voiced appreciation.  All questions answered & concerns addressed.     1/19/23  Rafi is a 41 yo AAM here today for HIV f/u visit.  He is taking Biktarvy daily & is virally suppressed.  Labs 8/1/22 VL UD, CD4 1146, RPR NR.  He is requesting refills on Vitamin D, taking weekly.  He tells me that he has been noting recurrent flaking and dry skin to face & scalp.  Appreciates treatment for same.   Amenable to flu vaccine today.  Sildenafil 60 mg effective for ED, needs refills.  All questions answered & concerns addressed.       Past Medical History:   Diagnosis Date    Depression     HIV infection     Hx of herpes genitalis         Past Surgical History:   Procedure Laterality Date    HERNIA REPAIR          Social History     Socioeconomic History    Marital status: Single   Tobacco Use    Smoking status: Former    Smokeless tobacco: Former   Substance and Sexual Activity    Alcohol use: Yes     Comment: occassionally    Drug use: Not Currently    Sexual activity: Yes     Partners: Female     Birth control/protection: Condom        Family History   Problem Relation Age of Onset    Hypertension Mother     Heart disease Father     No Known Problems Daughter     No Known Problems Daughter     No Known Problems Daughter     No Known Problems Daughter         Review of patient's allergies indicates:  No Known Allergies     Immunization History   Administered Date(s) Administered    HPV 9-Valent 03/22/2019, 11/04/2019, 10/30/2020    Hepatitis A / Hepatitis B 12/01/2008    Influenza - Quadrivalent 10/18/2016, 10/30/2020    Influenza - Quadrivalent - PF (6-35 months) 12/29/2017, 12/03/2018    Influenza - Quadrivalent - PF *Preferred* (6 months and older) 10/09/2009, 10/26/2010, 11/04/2019, 01/19/2023, 09/25/2023    Influenza - Trivalent - PF (ADULT) 10/09/2009, 10/26/2010, 09/30/2011, 01/21/2014, 01/08/2015, 03/17/2016    Meningococcal Conjugate (MCV4P) 07/31/2018, 12/03/2018    Pneumococcal 01/21/2014    Pneumococcal Conjugate - 13 Valent 01/08/2015    Pneumococcal Conjugate - 7 Valent 09/17/2008    Pneumococcal Polysaccharide - 23 Valent 01/21/2014, 03/22/2019    Tdap 07/13/2015        Review of Systems   Constitutional:  Positive for malaise/fatigue.   HENT: Negative.     Eyes: Negative.    Respiratory: Negative.     Cardiovascular: Negative.    Gastrointestinal: Negative.    Genitourinary: Negative.   "  Musculoskeletal: Negative.    Skin: Negative.    Neurological: Negative.    Endo/Heme/Allergies: Negative.    Psychiatric/Behavioral: Negative.     All other systems reviewed and are negative.         Objective:      /77 (BP Location: Right arm, Patient Position: Sitting, BP Method: Large (Automatic))   Pulse 69   Temp 97.9 °F (36.6 °C) (Oral)   Resp 18   Ht 6' 2" (1.88 m)   Wt 102.1 kg (225 lb)   BMI 28.89 kg/m²      Physical Exam  Vitals reviewed.   Constitutional:       General: He is not in acute distress.     Appearance: Normal appearance. He is not toxic-appearing.   HENT:      Mouth/Throat:      Mouth: Mucous membranes are moist.      Pharynx: Oropharynx is clear.   Eyes:      Conjunctiva/sclera: Conjunctivae normal.   Cardiovascular:      Rate and Rhythm: Normal rate and regular rhythm.   Pulmonary:      Effort: Pulmonary effort is normal. No respiratory distress.      Breath sounds: Normal breath sounds.   Abdominal:      General: Abdomen is flat. Bowel sounds are normal.      Palpations: Abdomen is soft.   Musculoskeletal:         General: Normal range of motion.      Cervical back: Normal range of motion.   Lymphadenopathy:      Cervical: No cervical adenopathy.   Skin:     General: Skin is warm and dry.   Neurological:      General: No focal deficit present.      Mental Status: He is alert and oriented to person, place, and time. Mental status is at baseline.   Psychiatric:         Mood and Affect: Mood normal.         Behavior: Behavior normal.          Labs: Reviewed most recent relevant labs available, notable results highlighted in this note    Imaging: Reviewed most recent relevant imaging studies available, notable results highlighted in this note      Medications:     Current Outpatient Medications   Medication Instructions    acyclovir (ZOVIRAX) 400 mg, Oral, 2 times daily PRN    hxkpmzwcx-hibyplad-zvharbw ala (BIKTARVY) -25 mg (25 kg or greater) 1 tablet, Oral, Daily    " ergocalciferol (ERGOCALCIFEROL) 50,000 Units, Oral, Every 7 days    ketoconazole (NIZORAL) 2 % shampoo Topical (Top), Twice weekly    sildenafil (REVATIO) 60 mg, Oral, As needed (PRN)       Assessment:       Problem List Items Addressed This Visit          Renal/    Erectile dysfunction    Relevant Medications    sildenafil (REVATIO) 20 mg Tab       ID    HSV (herpes simplex virus) infection    Relevant Medications    acyclovir (ZOVIRAX) 400 MG tablet     Other Visit Diagnoses       HIV disease    -  Primary    Relevant Medications    wgneliabk-pxxtoxhe-bvdlyxg ala (BIKTARVY) -25 mg (25 kg or greater)    Other Relevant Orders    CD4 Lymphocytes    CBC Auto Differential    Comprehensive Metabolic Panel    HIV-1 RNA, Quantitative, PCR with Reflex to Genotype    Need for vaccination        Colon cancer screening        Relevant Orders    Cologuard Screening (Multitarget Stool DNA)               Plan:      HIV disease  -     oddhcpejq-qistsnra-gvvdlun ala (BIKTARVY) -25 mg (25 kg or greater); Take 1 tablet by mouth once daily.  Dispense: 90 tablet; Refill: 1  -     CD4 Lymphocytes; Future; Expected date: 09/25/2023  -     CBC Auto Differential; Future; Expected date: 09/25/2023  -     Comprehensive Metabolic Panel  -     HIV-1 RNA, Quantitative, PCR with Reflex to Genotype; Future; Expected date: 09/25/2023  Adherence and sexual health counseling done.  Use condoms for all sexual encounters.  Blood precautions.   Continue Biktarvy as prescribed.  Labs today.  RTC 4 months with Cindy.     HIV Wellness:  Anal pap: 7/21 NIL  Oral CT/GC: 11/19 Neg  Anal CT/GC: 11/19 Neg  Urine CT/GC: 1/23 Neg, 5/23 Neg  RPR:  1/23 NR, 5/23 NR  Ophth: 1/21    Need for vaccination  -     Influenza - Quadrivalent (PF)    Erectile dysfunction, unspecified erectile dysfunction type  -     sildenafil (REVATIO) 20 mg Tab; Take 3 tablets (60 mg total) by mouth as needed (ED).  Dispense: 30 tablet; Refill: 4  Sildenafil 20 mg 2-3 tabs  PRN Erectile Dysfunction.  Use with condoms.  Do not take more than every 3 days.  ED precautions.    Colon cancer screening  -     Cologuard Screening (Multitarget Stool DNA); Future; Expected date: 09/25/2023  Cologuard next available.     HSV (herpes simplex virus) infection  -     acyclovir (ZOVIRAX) 400 MG tablet; Take 1 tablet (400 mg total) by mouth 2 (two) times daily as needed (outbreaks).  Dispense: 20 tablet; Refill: 0  Acyclovir 400 mg bid x 10 days PRN outbreak.

## 2023-09-26 LAB
AGE: >18
CD3+CD4+ CELLS # SPEC: 1043 UNIT/L (ref 589–1505)
CD3+CD4+ CELLS NFR BLD: 49.22 %
LYMPHOCYTES # BLD AUTO: 2119.45 X10(3)/MCL (ref 1260–5520)
LYMPHOCYTES NFR LN MANUAL: 43.7 % (ref 28–48)
LYMPHOMA - T-CELL MARKERS SPEC-IMP: NORMAL
WBC # BLD AUTO: 4850 /MM3 (ref 4500–11500)

## 2023-09-27 LAB — HIV1 RNA # PLAS NAA DL=20: NORMAL COPIES/ML

## 2023-10-17 LAB — NONINV COLON CA DNA+OCC BLD SCRN STL QL: NEGATIVE

## 2024-01-23 ENCOUNTER — OFFICE VISIT (OUTPATIENT)
Dept: INFECTIOUS DISEASES | Facility: CLINIC | Age: 44
End: 2024-01-23
Payer: MEDICAID

## 2024-01-23 DIAGNOSIS — N52.9 ERECTILE DYSFUNCTION, UNSPECIFIED ERECTILE DYSFUNCTION TYPE: ICD-10-CM

## 2024-01-23 DIAGNOSIS — B20 HIV DISEASE: Primary | ICD-10-CM

## 2024-01-23 DIAGNOSIS — B00.9 HSV (HERPES SIMPLEX VIRUS) INFECTION: ICD-10-CM

## 2024-01-23 DIAGNOSIS — E55.9 VITAMIN D DEFICIENCY: ICD-10-CM

## 2024-01-23 DIAGNOSIS — L21.9 SEBORRHEA: ICD-10-CM

## 2024-01-23 PROCEDURE — 1159F MED LIST DOCD IN RCRD: CPT | Mod: CPTII,95,, | Performed by: NURSE PRACTITIONER

## 2024-01-23 PROCEDURE — 99214 OFFICE O/P EST MOD 30 MIN: CPT | Mod: 95,,, | Performed by: NURSE PRACTITIONER

## 2024-01-23 PROCEDURE — 1160F RVW MEDS BY RX/DR IN RCRD: CPT | Mod: CPTII,95,, | Performed by: NURSE PRACTITIONER

## 2024-01-23 RX ORDER — BICTEGRAVIR SODIUM, EMTRICITABINE, AND TENOFOVIR ALAFENAMIDE FUMARATE 50; 200; 25 MG/1; MG/1; MG/1
1 TABLET ORAL DAILY
Qty: 90 TABLET | Refills: 1 | Status: SHIPPED | OUTPATIENT
Start: 2024-01-23 | End: 2024-04-23 | Stop reason: SDUPTHER

## 2024-01-23 RX ORDER — ERGOCALCIFEROL 1.25 MG/1
50000 CAPSULE ORAL
Qty: 12 CAPSULE | Refills: 1 | Status: SHIPPED | OUTPATIENT
Start: 2024-01-23 | End: 2024-04-23 | Stop reason: SDUPTHER

## 2024-01-23 RX ORDER — ACYCLOVIR 400 MG/1
400 TABLET ORAL 2 TIMES DAILY PRN
Qty: 20 TABLET | Refills: 0 | Status: SHIPPED | OUTPATIENT
Start: 2024-01-23 | End: 2024-04-23

## 2024-01-23 RX ORDER — KETOCONAZOLE 20 MG/ML
SHAMPOO, SUSPENSION TOPICAL
Qty: 120 ML | Refills: 1 | Status: SHIPPED | OUTPATIENT
Start: 2024-01-25 | End: 2024-04-23 | Stop reason: SDUPTHER

## 2024-01-23 RX ORDER — SILDENAFIL CITRATE 20 MG/1
60 TABLET ORAL
Qty: 30 TABLET | Refills: 4 | Status: SHIPPED | OUTPATIENT
Start: 2024-01-23 | End: 2024-04-23 | Stop reason: SDUPTHER

## 2024-01-23 NOTE — PROGRESS NOTES
"Patient ID: Rafi Aguillon Jr. 43 y.o.     Chief Complaint:   Chief Complaint   Patient presents with    Followup HIV     States not feeling too well. Denies fever. Wakes up with severe HA's. Sometimes releived with Tylenol. Something off and chest feels "different". States does have a Covid home test and will take   Patient and provider are located in the [facility] [place].    Face to Face time with patient:  30 minutes of total time spent on the encounter, which includes face to face time and non-face to face time preparing to see the patient (eg, review of tests), Obtaining and/or reviewing separately obtained history, Documenting clinical information in the electronic or other health record, Independently interpreting results (not separately reported) and communicating results to the patient/family/caregiver, or Care coordination (not separately reported).     Each patient to whom he or she provides medical services by telemedicine is:  (1) informed of the relationship between the physician and patient and the respective role of any other health care provider with respect to management of the patient; and (2) notified that he or she may decline to receive medical services by telemedicine and may withdraw from such care at any time.    HPI:    1/23/24  Rafi is a 42 yo AAM evaluated today via audio-video virtual visit for HIV f/u. He takes Biktarvy every day & is virally suppressed. Labs 9/23 VL UD, CD4 1043.  He completed Cologuard 10/23, Negative. He is planning to get  5/18/24 in [place], will arrange f/u visit in accordance with his plans. Mood is stable. Sildenafil effective for ED. Still inconsistent with vitamin D replacement. He declines COVID vaccination. Appreciates flexibility of virtual visits. Will return within 1-2 weeks for labs, orders placed. All questions answered & concerns addressed.    9/25/23  Rafi is a 42 yo AAM presenting today for HIV f/u visit. He is taking Biktarvy daily with " viral suppression. Labs 5/23 VL UD, 1/23 CD4 1118.  STI screenings negative 5/23, denies any new sexual partners and tells me that he is engaged to his youngest daughter's mother. Congratulations offered. Vitamin D level 14.3 1/23. He tells me that he continues to forget to take replacement. Encouraged him to take weekly as prescribed and he may find that repletion will increase energy levels. Pillbox provided to assist with same. Low dose sildenafil effective for ED, appreciates refill.  Not taking psych medications, states not needed. Amenable to flu vaccine today. Due for colon cancer screen. Denies family history of colon cancer or rectal bleeding. Amenable to cologuard as ordered and agrees to return sample. All questions answered & concerns addressed.     5/22/23  Rafi is a 42 yo AAM here today for HIV f/u visit.  He takes Biktarvy daily and is tolerating it well.  Labs 1/19/23 VL UD, CD4 1118, RPR NR, CT/GC negative.  He is not currently taking vitamin D, states did not receive with medication delivery.  Will send new refill.  He states that ketoconazole shampoo is effective for seborrhea, recurring now & appreciates refill. Sildenafil effective for ED, requests refill to [facility].  No new sexual partners, but is sexually active. Will screen for STI, voiced appreciation.  All questions answered & concerns addressed.           Past Medical History:   Diagnosis Date    Depression     HIV infection     Hx of herpes genitalis         Past Surgical History:   Procedure Laterality Date    HERNIA REPAIR          Social History     Socioeconomic History    Marital status: Single   Tobacco Use    Smoking status: Former    Smokeless tobacco: Former   Substance and Sexual Activity    Alcohol use: Yes     Comment: occassionally    Drug use: Not Currently    Sexual activity: Yes     Partners: Female     Birth control/protection: Condom        Family History   Problem Relation Age of Onset    Hypertension Mother     Heart  disease Father     No Known Problems Daughter     No Known Problems Daughter     No Known Problems Daughter     No Known Problems Daughter         Review of patient's allergies indicates:  No Known Allergies     Immunization History   Administered Date(s) Administered    HPV 9-Valent 03/22/2019, 11/04/2019, 10/30/2020    Hepatitis A / Hepatitis B 12/01/2008    Influenza - Quadrivalent 10/18/2016, 10/30/2020    Influenza - Quadrivalent - PF (6-35 months) 12/29/2017, 12/03/2018    Influenza - Quadrivalent - PF *Preferred* (6 months and older) 10/09/2009, 10/26/2010, 11/04/2019, 01/19/2023, 09/25/2023    Influenza - Trivalent - PF (ADULT) 10/09/2009, 10/26/2010, 09/30/2011, 01/21/2014, 01/08/2015, 03/17/2016    Meningococcal Conjugate (MCV4P) 07/31/2018, 12/03/2018    Pneumococcal 01/21/2014    Pneumococcal Conjugate - 13 Valent 01/08/2015    Pneumococcal Conjugate - 7 Valent 09/17/2008    Pneumococcal Polysaccharide - 23 Valent 01/21/2014, 03/22/2019    Tdap 07/13/2015        Review of Systems   Constitutional: Negative.    HENT: Negative.     Eyes: Negative.    Respiratory: Negative.     Cardiovascular: Negative.    Gastrointestinal: Negative.    Genitourinary: Negative.    Musculoskeletal: Negative.    Skin: Negative.    Neurological: Negative.    Endo/Heme/Allergies: Negative.    Psychiatric/Behavioral: Negative.     All other systems reviewed and are negative.         Objective:      There were no vitals taken for this visit.     Physical Exam  Constitutional:       General: He is not in acute distress.     Appearance: Normal appearance.   HENT:      Head: Normocephalic.   Eyes:      Conjunctiva/sclera: Conjunctivae normal.   Pulmonary:      Effort: Pulmonary effort is normal.   Musculoskeletal:         General: Normal range of motion.      Cervical back: Normal range of motion.   Neurological:      General: No focal deficit present.      Mental Status: He is alert and oriented to person, place, and time. Mental  status is at baseline.   Psychiatric:         Mood and Affect: Mood normal.         Behavior: Behavior normal.         Thought Content: Thought content normal.         Judgment: Judgment normal.          Labs:   Lab Results   Component Value Date    WBC 4.85 09/25/2023    HGB 15.0 09/25/2023    HCT 45.4 09/25/2023    MCV 88.0 09/25/2023     09/25/2023       CMP  Sodium Level   Date Value Ref Range Status   09/25/2023 140 136 - 145 mmol/L Final     Potassium Level   Date Value Ref Range Status   09/25/2023 4.2 3.5 - 5.1 mmol/L Final     Carbon Dioxide   Date Value Ref Range Status   09/25/2023 27 22 - 29 mmol/L Final     Blood Urea Nitrogen   Date Value Ref Range Status   09/25/2023 16.3 8.9 - 20.6 mg/dL Final     Creatinine   Date Value Ref Range Status   09/25/2023 1.18 0.73 - 1.18 mg/dL Final     Calcium Level Total   Date Value Ref Range Status   09/25/2023 9.5 8.4 - 10.2 mg/dL Final     Albumin Level   Date Value Ref Range Status   09/25/2023 4.3 3.5 - 5.0 g/dL Final     Bilirubin Total   Date Value Ref Range Status   09/25/2023 0.5 <=1.5 mg/dL Final     Alkaline Phosphatase   Date Value Ref Range Status   09/25/2023 53 40 - 150 unit/L Final     Aspartate Aminotransferase   Date Value Ref Range Status   09/25/2023 23 5 - 34 unit/L Final     Alanine Aminotransferase   Date Value Ref Range Status   09/25/2023 21 0 - 55 unit/L Final     eGFR   Date Value Ref Range Status   09/25/2023 >60 mls/min/1.73/m2 Final     Lab Results   Component Value Date    TSH 0.847 01/19/2023     Hep C Ab Interp   Date Value Ref Range Status   01/19/2023 Nonreactive Nonreactive Final     Syphilis Antibody   Date Value Ref Range Status   05/22/2023 Nonreactive Nonreactive, Equivocal Final     Cholesterol Total   Date Value Ref Range Status   01/19/2023 186 <=200 mg/dL Final     HDL Cholesterol   Date Value Ref Range Status   01/19/2023 33 (L) 35 - 60 mg/dL Final     Triglyceride   Date Value Ref Range Status   01/19/2023 127 34 -  140 mg/dL Final     Cholesterol/HDL Ratio   Date Value Ref Range Status   01/19/2023 6 (H) 0 - 5 Final     Very Low Density Lipoprotein   Date Value Ref Range Status   01/19/2023 25  Final     LDL Cholesterol   Date Value Ref Range Status   01/19/2023 128.00 50.00 - 140.00 mg/dL Final     Vit D 25 OH   Date Value Ref Range Status   01/19/2023 14.5 (L) 30.0 - 80.0 ng/mL Final     Results for orders placed or performed in visit on 09/25/23   CD4 Lymphocytes   Result Value Ref Range    Patient Age >18     WBC Absolute 4,850 4,500 - 11,500 /mm3    Lymph Percent 43.7 28 - 48 %    Lymph Absolute 2,119.45 1,260 - 5,520 x10(3)/mcL    CD4 % 49.22 %    CD4 Absolute 1,043 589 - 1,505 unit/L    T Cell Interp       Normal absolute lymphocyte count with normal absolute CD4+ lymphocyte count.    Arnel Riddle M.D.     Narrative    This test was developed and its performance characteristics determined by [facility]. It has not been cleared or approved by the US Food and Drug Administration. The FDA does not require this test to go through premarket FDA review. This test is used for clinical purposes. It should not be regarded as investigational or for research. This laboratory is certified under the Clinical Laboratory Improvement Amendments (CLIA) as qualified to perform high complexity clinical laboratory testing.     Results for orders placed or performed in visit on 09/25/23   HIV-1 RNA, Quantitative, PCR with Reflex to Genotype   Result Value Ref Range    HIV-1 RNA Detect/Quant, P Undetected Undetected copies/mL     Results for orders placed or performed in visit on 01/19/23   Quantiferon Gold TB   Result Value Ref Range    QuantiFERON-Tb Gold Plus Result Negative Negative    TB1 Ag minus Nil Result 0.00 IU/mL    TB2 Ag minus Nil Result 0.00 IU/mL    Mitogen minus Nil Result >10.00 IU/mL    Nil Result 0.01 IU/mL     No results found for this or any previous visit.  Results for orders placed  or performed in visit on 01/19/23   Urinalysis   Result Value Ref Range    Color, UA Yellow Yellow, Light-Yellow, Dark Yellow, [place], Straw    Appearance, UA Clear Clear    Specific Gravity, UA 1.025     pH, UA 5.5 5.0 - 8.5    Protein, UA Negative Negative mg/dL    Glucose, UA Normal Negative, Normal mg/dL    Ketones, UA Negative Negative mg/dL    Blood, UA Negative Negative unit/L    Bilirubin, UA Negative Negative mg/dL    Urobilinogen, UA Normal 0.2, 1.0, Normal mg/dL    Nitrites, UA Negative Negative    Leukocyte Esterase, UA Negative Negative unit/L    WBC, UA 0-5 None Seen, 0-2, 3-5, 0-5 /HPF    Bacteria, UA None Seen None Seen /HPF    Squamous Epithelial Cells, UA None Seen None Seen /HPF    Mucous, UA Trace (A) None Seen /LPF    Hyaline Casts, UA None Seen None Seen /lpf    RBC, UA 0-5 None Seen, 0-2, 3-5, 0-5 /HPF       Imaging: Reviewed most recent relevant imaging studies available, notable results highlighted in this note    Medications:     Current Outpatient Medications   Medication Instructions    acyclovir (ZOVIRAX) 400 mg, Oral, 2 times daily PRN    hbjjsscxn-tibzztry-tafvntl ala (BIKTARVY) -25 mg (25 kg or greater) 1 tablet, Oral, Daily    ergocalciferol (ERGOCALCIFEROL) 50,000 Units, Oral, Every 7 days    [START ON 1/25/2024] ketoconazole (NIZORAL) 2 % shampoo Topical (Top), Twice weekly    sildenafil (REVATIO) 60 mg, Oral, As needed (PRN)       Assessment:       Problem List Items Addressed This Visit          Renal/    Erectile dysfunction    Relevant Medications    sildenafil (REVATIO) 20 mg Tab       ID    HSV (herpes simplex virus) infection    Relevant Medications    acyclovir (ZOVIRAX) 400 MG tablet       Endocrine    Vitamin D deficiency    Relevant Medications    ergocalciferol (ERGOCALCIFEROL) 50,000 unit Cap     Other Visit Diagnoses       HIV disease    -  Primary    Relevant Medications    pxdphophw-bycokhuu-ajyibqj ala (BIKTARVY) -25 mg (25 kg or greater)    Other  Relevant Orders    Comprehensive Metabolic Panel    HIV-1 RNA, Quantitative, PCR with Reflex to Genotype    Seborrhea        Relevant Medications    ketoconazole (NIZORAL) 2 % shampoo (Start on 1/25/2024)               Plan:      HIV disease  -     vwmehwwes-vkgudqok-nmjjnly ala (BIKTARVY) -25 mg (25 kg or greater); Take 1 tablet by mouth once daily.  Dispense: 90 tablet; Refill: 1  -     Comprehensive Metabolic Panel; Future; Expected date: 01/29/2024  -     HIV-1 RNA, Quantitative, PCR with Reflex to Genotype; Future; Expected date: 01/29/2024  Adherence and sexual health counseling done.  Use condoms for all sexual encounters.  Blood precautions.   Continue Biktarvy as prescribed.  Labs within 1-2 weeks.  RTC 3 months with jodi White.     HIV Wellness:  Anal pap: 7/21 NIL  Oral CT/GC: 11/19 Neg  Anal CT/GC: 11/19 Neg  Urine CT/GC: 1/23 Neg, 5/23 Neg  RPR:  1/23 NR, 5/23 NR  Ophth: 1/21     Vitamin D deficiency  -     ergocalciferol (ERGOCALCIFEROL) 50,000 unit Cap; Take 1 capsule (50,000 Units total) by mouth every 7 days.  Dispense: 12 capsule; Refill: 1  Ergocalciferol weekly as prescribed.     Erectile dysfunction, unspecified erectile dysfunction type  -     sildenafil (REVATIO) 20 mg Tab; Take 3 tablets (60 mg total) by mouth as needed (ED).  Dispense: 30 tablet; Refill: 4  Sildenafil 20 mg 2-3 tabs PRN Erectile Dysfunction.  Use with condoms.  Do not take more than every 3 days.  ED precautions.    HSV (herpes simplex virus) infection  -     acyclovir (ZOVIRAX) 400 MG tablet; Take 1 tablet (400 mg total) by mouth 2 (two) times daily as needed (outbreaks).  Dispense: 20 tablet; Refill: 0  Acyclovir 400 mg bid x 10 days PRN outbreak.      Seborrhea  -     ketoconazole (NIZORAL) 2 % shampoo; Apply topically twice a week.  Dispense: 120 mL; Refill: 1  Ketoconazole shampoo as needed for flares.

## 2024-01-25 NOTE — TELEPHONE ENCOUNTER
Phoned [facility]. Questioned if p.a. is needed for sildenafil rx. States not covered and cash pay is $17.00. Phoned patient. States normally does pay cash for medication. Contacted [facility]. Informed that patient requests to pay for medication. Verbalized understanding.

## 2024-02-05 ENCOUNTER — LAB VISIT (OUTPATIENT)
Dept: LAB | Facility: HOSPITAL | Age: 44
End: 2024-02-05
Attending: NURSE PRACTITIONER
Payer: MEDICAID

## 2024-02-05 DIAGNOSIS — B20 HIV DISEASE: ICD-10-CM

## 2024-02-05 LAB
ALBUMIN SERPL-MCNC: 4 G/DL (ref 3.5–5)
ALBUMIN/GLOB SERPL: 1.1 RATIO (ref 1.1–2)
ALP SERPL-CCNC: 58 UNIT/L (ref 40–150)
ALT SERPL-CCNC: 32 UNIT/L (ref 0–55)
AST SERPL-CCNC: 25 UNIT/L (ref 5–34)
BILIRUB SERPL-MCNC: 0.4 MG/DL
BUN SERPL-MCNC: 17.7 MG/DL (ref 8.9–20.6)
CALCIUM SERPL-MCNC: 9.3 MG/DL (ref 8.4–10.2)
CHLORIDE SERPL-SCNC: 107 MMOL/L (ref 98–107)
CO2 SERPL-SCNC: 27 MMOL/L (ref 22–29)
CREAT SERPL-MCNC: 1.09 MG/DL (ref 0.73–1.18)
GFR SERPLBLD CREATININE-BSD FMLA CKD-EPI: >60 MLS/MIN/1.73/M2
GLOBULIN SER-MCNC: 3.6 GM/DL (ref 2.4–3.5)
GLUCOSE SERPL-MCNC: 112 MG/DL (ref 74–100)
POTASSIUM SERPL-SCNC: 4 MMOL/L (ref 3.5–5.1)
PROT SERPL-MCNC: 7.6 GM/DL (ref 6.4–8.3)
SODIUM SERPL-SCNC: 142 MMOL/L (ref 136–145)

## 2024-02-05 PROCEDURE — 80053 COMPREHEN METABOLIC PANEL: CPT

## 2024-02-05 PROCEDURE — 87536 HIV-1 QUANT&REVRSE TRNSCRPJ: CPT

## 2024-02-05 PROCEDURE — 36415 COLL VENOUS BLD VENIPUNCTURE: CPT

## 2024-02-06 LAB — HIV1 RNA # PLAS NAA DL=20: NORMAL COPIES/ML

## 2024-02-19 DIAGNOSIS — B00.9 HSV (HERPES SIMPLEX VIRUS) INFECTION: ICD-10-CM

## 2024-02-20 RX ORDER — ACYCLOVIR 400 MG/1
TABLET ORAL
Qty: 20 TABLET | Refills: 0 | OUTPATIENT
Start: 2024-02-20

## 2024-02-20 NOTE — TELEPHONE ENCOUNTER
Guicho Akins sent to P [facility] Disease Clinical Support Staff  Caller: Unspecified (Today,  9:10 AM)  Cindy Pt      Pt stated he received a call from us  asked if you can call pt back once your out of clinic .      Pt#[phone]    Phoned patient. No answer. Message left on voice mail to contact clinic.

## 2024-04-23 ENCOUNTER — PATIENT MESSAGE (OUTPATIENT)
Dept: INFECTIOUS DISEASES | Facility: CLINIC | Age: 44
End: 2024-04-23

## 2024-04-23 ENCOUNTER — OFFICE VISIT (OUTPATIENT)
Dept: INFECTIOUS DISEASES | Facility: CLINIC | Age: 44
End: 2024-04-23
Payer: COMMERCIAL

## 2024-04-23 DIAGNOSIS — E55.9 VITAMIN D DEFICIENCY: ICD-10-CM

## 2024-04-23 DIAGNOSIS — B20 HIV DISEASE: ICD-10-CM

## 2024-04-23 DIAGNOSIS — L21.9 SEBORRHEA: ICD-10-CM

## 2024-04-23 DIAGNOSIS — N52.9 ERECTILE DYSFUNCTION, UNSPECIFIED ERECTILE DYSFUNCTION TYPE: ICD-10-CM

## 2024-04-23 PROCEDURE — 99214 OFFICE O/P EST MOD 30 MIN: CPT | Mod: 95,,, | Performed by: NURSE PRACTITIONER

## 2024-04-23 RX ORDER — KETOCONAZOLE 20 MG/ML
SHAMPOO, SUSPENSION TOPICAL
Qty: 120 ML | Refills: 1 | Status: SHIPPED | OUTPATIENT
Start: 2024-04-25

## 2024-04-23 RX ORDER — SILDENAFIL CITRATE 20 MG/1
60 TABLET ORAL
Qty: 30 TABLET | Refills: 4 | Status: SHIPPED | OUTPATIENT
Start: 2024-04-23

## 2024-04-23 RX ORDER — BICTEGRAVIR SODIUM, EMTRICITABINE, AND TENOFOVIR ALAFENAMIDE FUMARATE 50; 200; 25 MG/1; MG/1; MG/1
1 TABLET ORAL DAILY
Qty: 90 TABLET | Refills: 1 | Status: SHIPPED | OUTPATIENT
Start: 2024-04-23

## 2024-04-23 RX ORDER — ERGOCALCIFEROL 1.25 MG/1
50000 CAPSULE ORAL
Qty: 12 CAPSULE | Refills: 1 | Status: SHIPPED | OUTPATIENT
Start: 2024-04-23

## 2024-04-23 NOTE — PROGRESS NOTES
Patient ID: Rafi Aguillon Jr. 43 y.o.     Chief Complaint:   Chief Complaint   Patient presents with    Followup HIV     States having low energy at the moment   Patient and provider are located in the [facility] [place].    Face to Face time with patient:  30 minutes of total time spent on the encounter, which includes face to face time and non-face to face time preparing to see the patient (eg, review of tests), Obtaining and/or reviewing separately obtained history, Documenting clinical information in the electronic or other health record, Independently interpreting results (not separately reported) and communicating results to the patient/family/caregiver, or Care coordination (not separately reported).     Each patient to whom he or she provides medical services by telemedicine is:  (1) informed of the relationship between the physician and patient and the respective role of any other health care provider with respect to management of the patient; and (2) notified that he or she may decline to receive medical services by telemedicine and may withdraw from such care at any time.     HPI:    4/23/24  Rafi is a 42 yo AAM evaluated today via audio-video virtual visit for HIV f/u. He is taking Biktarvy daily and has maintained viral suppression. Labs 2/24 VL UD, CMP within normal limits. He remains monogamous with fiance, post-poned wedding as they just purchased a house in [place]. He is doing well overall but does have some generalized aches and pains and has noted a decrease in energy levels. He is taking Vitamin D2, but not consistently. He will also add collagen powder for natural supplementation as advised. Appreciates refills on all medications. All questions answered & concerns addressed.    1/23/24  Rafi is a 42 yo AAM evaluated today via audio-video virtual visit for HIV f/u. He takes Biktarvy every day & is virally suppressed. Labs 9/23 VL UD, CD4 1043.  He completed Cologuard 10/23, Negative. He is  planning to get  5/18/24 in [place], will arrange f/u visit in accordance with his plans. Mood is stable. Sildenafil effective for ED. Still inconsistent with vitamin D replacement. He declines COVID vaccination. Appreciates flexibility of virtual visits. Will return within 1-2 weeks for labs, orders placed. All questions answered & concerns addressed.     9/25/23  Rafi is a 44 yo AAM presenting today for HIV f/u visit. He is taking Biktarvy daily with viral suppression. Labs 5/23 VL UD, 1/23 CD4 1118.  STI screenings negative 5/23, denies any new sexual partners and tells me that he is engaged to his youngest daughter's mother. Congratulations offered. Vitamin D level 14.3 1/23. He tells me that he continues to forget to take replacement. Encouraged him to take weekly as prescribed and he may find that repletion will increase energy levels. Pillbox provided to assist with same. Low dose sildenafil effective for ED, appreciates refill.  Not taking psych medications, states not needed. Amenable to flu vaccine today. Due for colon cancer screen. Denies family history of colon cancer or rectal bleeding. Amenable to cologuard as ordered and agrees to return sample. All questions answered & concerns addressed.           Past Medical History:   Diagnosis Date    Depression     HIV infection     Hx of herpes genitalis         Past Surgical History:   Procedure Laterality Date    HERNIA REPAIR          Social History     Socioeconomic History    Marital status: Single   Tobacco Use    Smoking status: Former    Smokeless tobacco: Former   Substance and Sexual Activity    Alcohol use: Yes     Comment: occassionally    Drug use: Not Currently    Sexual activity: Yes     Partners: Female     Birth control/protection: Condom        Family History   Problem Relation Name Age of Onset    Hypertension Mother      Heart disease Father      No Known Problems Daughter      No Known Problems Daughter      No Known Problems  Daughter      No Known Problems Daughter          Review of patient's allergies indicates:  No Known Allergies     Immunization History   Administered Date(s) Administered    HPV 9-Valent 03/22/2019, 11/04/2019, 10/30/2020    Hepatitis A / Hepatitis B 12/01/2008    Influenza 01/21/2014    Influenza - Quadrivalent 10/18/2016, 10/30/2020    Influenza - Quadrivalent - PF (6-35 months) 12/29/2017, 12/03/2018    Influenza - Quadrivalent - PF *Preferred* (6 months and older) 10/09/2009, 10/26/2010, 11/04/2019, 01/19/2023, 09/25/2023    Influenza - Trivalent (ADULT) 10/09/2009, 10/26/2010    Influenza - Trivalent - PF (ADULT) 10/09/2009, 10/26/2010, 09/30/2011, 01/21/2014, 01/08/2015, 03/17/2016    Meningococcal Conjugate (MCV4P) 07/31/2018, 12/03/2018    Pneumococcal 01/21/2014    Pneumococcal Conjugate - 13 Valent 01/08/2015    Pneumococcal Conjugate - 7 Valent 09/17/2008    Pneumococcal Polysaccharide - 23 Valent 01/21/2014, 03/22/2019    Tdap 07/13/2015        Review of Systems   Constitutional:  Positive for malaise/fatigue.   HENT: Negative.     Eyes: Negative.    Respiratory: Negative.     Cardiovascular: Negative.    Gastrointestinal: Negative.    Genitourinary: Negative.    Musculoskeletal: Negative.    Skin: Negative.    Neurological: Negative.    Endo/Heme/Allergies: Negative.    Psychiatric/Behavioral: Negative.     All other systems reviewed and are negative.         Objective:      There were no vitals taken for this visit.     Physical Exam  Constitutional:       General: He is not in acute distress.     Appearance: Normal appearance.   HENT:      Head: Normocephalic.   Eyes:      Conjunctiva/sclera: Conjunctivae normal.   Pulmonary:      Effort: Pulmonary effort is normal.   Musculoskeletal:         General: Normal range of motion.      Cervical back: Normal range of motion.   Neurological:      General: No focal deficit present.      Mental Status: He is alert and oriented to person, place, and time.  Mental status is at baseline.   Psychiatric:         Mood and Affect: Mood normal.         Behavior: Behavior normal.         Thought Content: Thought content normal.         Judgment: Judgment normal.          Labs:   Lab Results   Component Value Date    WBC 4.85 09/25/2023    HGB 15.0 09/25/2023    HCT 45.4 09/25/2023    MCV 88.0 09/25/2023     09/25/2023       CMP  Sodium Level   Date Value Ref Range Status   02/05/2024 142 136 - 145 mmol/L Final     Potassium Level   Date Value Ref Range Status   02/05/2024 4.0 3.5 - 5.1 mmol/L Final     Carbon Dioxide   Date Value Ref Range Status   02/05/2024 27 22 - 29 mmol/L Final     Blood Urea Nitrogen   Date Value Ref Range Status   02/05/2024 17.7 8.9 - 20.6 mg/dL Final     Creatinine   Date Value Ref Range Status   02/05/2024 1.09 0.73 - 1.18 mg/dL Final     Calcium Level Total   Date Value Ref Range Status   02/05/2024 9.3 8.4 - 10.2 mg/dL Final     Albumin Level   Date Value Ref Range Status   02/05/2024 4.0 3.5 - 5.0 g/dL Final     Bilirubin Total   Date Value Ref Range Status   02/05/2024 0.4 <=1.5 mg/dL Final     Alkaline Phosphatase   Date Value Ref Range Status   02/05/2024 58 40 - 150 unit/L Final     Aspartate Aminotransferase   Date Value Ref Range Status   02/05/2024 25 5 - 34 unit/L Final     Alanine Aminotransferase   Date Value Ref Range Status   02/05/2024 32 0 - 55 unit/L Final     eGFR   Date Value Ref Range Status   02/05/2024 >60 mls/min/1.73/m2 Final     Lab Results   Component Value Date    TSH 0.847 01/19/2023     Hep C Ab Interp   Date Value Ref Range Status   01/19/2023 Nonreactive Nonreactive Final     Syphilis Antibody   Date Value Ref Range Status   05/22/2023 Nonreactive Nonreactive, Equivocal Final     Cholesterol Total   Date Value Ref Range Status   01/19/2023 186 <=200 mg/dL Final     HDL Cholesterol   Date Value Ref Range Status   01/19/2023 33 (L) 35 - 60 mg/dL Final     Triglyceride   Date Value Ref Range Status   01/19/2023  127 34 - 140 mg/dL Final     Cholesterol/HDL Ratio   Date Value Ref Range Status   01/19/2023 6 (H) 0 - 5 Final     Very Low Density Lipoprotein   Date Value Ref Range Status   01/19/2023 25  Final     LDL Cholesterol   Date Value Ref Range Status   01/19/2023 128.00 50.00 - 140.00 mg/dL Final     Vit D 25 OH   Date Value Ref Range Status   01/19/2023 14.5 (L) 30.0 - 80.0 ng/mL Final     Results for orders placed or performed in visit on 09/25/23   CD4 Lymphocytes   Result Value Ref Range    Patient Age >18     WBC Absolute 4,850 4,500 - 11,500 /mm3    Lymph Percent 43.7 28 - 48 %    Lymph Absolute 2,119.45 1,260 - 5,520 x10(3)/mcL    CD4 % 49.22 %    CD4 Absolute 1,043 589 - 1,505 unit/L    T Cell Interp       Normal absolute lymphocyte count with normal absolute CD4+ lymphocyte count.    Arnel Riddle M.D.     Narrative    This test was developed and its performance characteristics determined by [facility]. It has not been cleared or approved by the US Food and Drug Administration. The FDA does not require this test to go through premarket FDA review. This test is used for clinical purposes. It should not be regarded as investigational or for research. This laboratory is certified under the Clinical Laboratory Improvement Amendments (CLIA) as qualified to perform high complexity clinical laboratory testing.     Results for orders placed or performed in visit on 02/05/24   HIV-1 RNA, Quantitative, PCR with Reflex to Genotype   Result Value Ref Range    HIV-1 RNA Detect/Quant, P Undetected Undetected copies/mL     Results for orders placed or performed in visit on 01/19/23   Quantiferon Gold TB   Result Value Ref Range    QuantiFERON-Tb Gold Plus Result Negative Negative    TB1 Ag minus Nil Result 0.00 IU/mL    TB2 Ag minus Nil Result 0.00 IU/mL    Mitogen minus Nil Result >10.00 IU/mL    Nil Result 0.01 IU/mL     No results found for this or any previous visit.  Results for orders  placed or performed in visit on 01/19/23   Urinalysis   Result Value Ref Range    Color, UA Yellow Yellow, Light-Yellow, Dark Yellow, [place], Straw    Appearance, UA Clear Clear    Specific Gravity, UA 1.025     pH, UA 5.5 5.0 - 8.5    Protein, UA Negative Negative mg/dL    Glucose, UA Normal Negative, Normal mg/dL    Ketones, UA Negative Negative mg/dL    Blood, UA Negative Negative unit/L    Bilirubin, UA Negative Negative mg/dL    Urobilinogen, UA Normal 0.2, 1.0, Normal mg/dL    Nitrites, UA Negative Negative    Leukocyte Esterase, UA Negative Negative unit/L    WBC, UA 0-5 None Seen, 0-2, 3-5, 0-5 /HPF    Bacteria, UA None Seen None Seen /HPF    Squamous Epithelial Cells, UA None Seen None Seen /HPF    Mucous, UA Trace (A) None Seen /LPF    Hyaline Casts, UA None Seen None Seen /lpf    RBC, UA 0-5 None Seen, 0-2, 3-5, 0-5 /HPF       Imaging: Reviewed most recent relevant imaging studies available, notable results highlighted in this note    Medications:     Current Outpatient Medications   Medication Instructions    acyclovir (ZOVIRAX) 400 mg, Oral, 2 times daily PRN    ilykieyxp-zwlsupnc-qqtzjuc ala (BIKTARVY) -25 mg (25 kg or greater) 1 tablet, Oral, Daily    ergocalciferol (ERGOCALCIFEROL) 50,000 Units, Oral, Every 7 days    [START ON 4/25/2024] ketoconazole (NIZORAL) 2 % shampoo Topical (Top), Twice weekly    sildenafil (REVATIO) 60 mg, Oral, As needed (PRN)       Assessment:       Problem List Items Addressed This Visit          Renal/    Erectile dysfunction    Relevant Medications    sildenafil (REVATIO) 20 mg Tab       Endocrine    Vitamin D deficiency    Relevant Medications    ergocalciferol (ERGOCALCIFEROL) 50,000 unit Cap     Other Visit Diagnoses       HIV disease        Relevant Medications    awrgxhmho-ofnexgwn-zzqnzap ala (BIKTARVY) -25 mg (25 kg or greater)    Seborrhea        Relevant Medications    ketoconazole (NIZORAL) 2 % shampoo (Start on 4/25/2024)               Plan:       HIV disease  -     jexdwqpeu-szqmqoeo-rwynqor ala (BIKTARVY) -25 mg (25 kg or greater); Take 1 tablet by mouth once daily.  Dispense: 90 tablet; Refill: 1  Adherence and sexual health counseling done.  Use condoms for all sexual encounters.  Blood precautions.   Continue Biktarvy as prescribed.  Labs same day as next visit.  RTC 4 months with Cindy, in clinic.   Link sent to patient to schedule.      HIV Wellness:  Anal pap: 7/21 NIL  Oral CT/GC: 11/19 Neg  Anal CT/GC: 11/19 Neg  Urine CT/GC: 1/23 Neg, 5/23 Neg  RPR:  1/23 NR, 5/23 NR  Ophth: 1/21    Vitamin D deficiency  -     ergocalciferol (ERGOCALCIFEROL) 50,000 unit Cap; Take 1 capsule (50,000 Units total) by mouth every 7 days.  Dispense: 12 capsule; Refill: 1  Ergocalciferol weekly as prescribed.     Erectile dysfunction, unspecified erectile dysfunction type  -     sildenafil (REVATIO) 20 mg Tab; Take 3 tablets (60 mg total) by mouth as needed (ED).  Dispense: 30 tablet; Refill: 4  Sildenafil 20 mg 2-3 tabs PRN Erectile Dysfunction.  Use with condoms.  Do not take more than every 3 days.  ED precautions.    Seborrhea  -     ketoconazole (NIZORAL) 2 % shampoo; Apply topically twice a week.  Dispense: 120 mL; Refill: 1  Ketoconazole shampoo as needed for flares.

## 2024-08-29 ENCOUNTER — OFFICE VISIT (OUTPATIENT)
Dept: INFECTIOUS DISEASES | Facility: CLINIC | Age: 44
End: 2024-08-29
Payer: COMMERCIAL

## 2024-08-29 ENCOUNTER — HOSPITAL ENCOUNTER (EMERGENCY)
Facility: HOSPITAL | Age: 44
Discharge: HOME | End: 2024-08-29
Attending: EMERGENCY MEDICINE
Payer: COMMERCIAL

## 2024-08-29 VITALS
BODY MASS INDEX: 29.44 KG/M2 | TEMPERATURE: 97 F | OXYGEN SATURATION: 97 % | WEIGHT: 229.25 LBS | SYSTOLIC BLOOD PRESSURE: 121 MMHG | RESPIRATION RATE: 21 BRPM | DIASTOLIC BLOOD PRESSURE: 95 MMHG | HEART RATE: 88 BPM

## 2024-08-29 VITALS
SYSTOLIC BLOOD PRESSURE: 97 MMHG | WEIGHT: 230.81 LBS | TEMPERATURE: 98 F | HEIGHT: 74 IN | DIASTOLIC BLOOD PRESSURE: 62 MMHG | RESPIRATION RATE: 12 BRPM | BODY MASS INDEX: 29.62 KG/M2 | HEART RATE: 89 BPM

## 2024-08-29 DIAGNOSIS — B20 HIV DISEASE: ICD-10-CM

## 2024-08-29 DIAGNOSIS — I49.9 IRREGULAR HEART RHYTHM: ICD-10-CM

## 2024-08-29 DIAGNOSIS — B20 HIV DISEASE: Primary | ICD-10-CM

## 2024-08-29 DIAGNOSIS — I48.91 NEW ONSET ATRIAL FIBRILLATION: Primary | ICD-10-CM

## 2024-08-29 DIAGNOSIS — Z23 NEED FOR VACCINATION: ICD-10-CM

## 2024-08-29 DIAGNOSIS — R00.0 RAPID HEART BEAT: ICD-10-CM

## 2024-08-29 LAB
25(OH)D3+25(OH)D2 SERPL-MCNC: 19 NG/ML (ref 30–80)
ANION GAP SERPL CALC-SCNC: 7 MEQ/L
BACTERIA #/AREA URNS AUTO: ABNORMAL /HPF
BASOPHILS # BLD AUTO: 0.04 X10(3)/MCL
BASOPHILS NFR BLD AUTO: 0.9 %
BILIRUB UR QL STRIP.AUTO: NEGATIVE
BNP BLD-MCNC: 11.7 PG/ML
BUN SERPL-MCNC: 13.5 MG/DL (ref 8.9–20.6)
C TRACH DNA SPEC QL NAA+PROBE: NOT DETECTED
CALCIUM SERPL-MCNC: 9.8 MG/DL (ref 8.4–10.2)
CHLORIDE SERPL-SCNC: 105 MMOL/L (ref 98–107)
CHOLEST SERPL-MCNC: 191 MG/DL
CHOLEST/HDLC SERPL: 5 {RATIO} (ref 0–5)
CLARITY UR: CLEAR
CO2 SERPL-SCNC: 26 MMOL/L (ref 22–29)
COLOR UR AUTO: ABNORMAL
CREAT SERPL-MCNC: 1.35 MG/DL (ref 0.73–1.18)
CREAT/UREA NIT SERPL: 10
EOSINOPHIL # BLD AUTO: 0.05 X10(3)/MCL (ref 0–0.9)
EOSINOPHIL NFR BLD AUTO: 1.2 %
ERYTHROCYTE [DISTWIDTH] IN BLOOD BY AUTOMATED COUNT: 13.2 % (ref 11.5–17)
EST. AVERAGE GLUCOSE BLD GHB EST-MCNC: 116.9 MG/DL
GFR SERPLBLD CREATININE-BSD FMLA CKD-EPI: >60 ML/MIN/1.73/M2
GLUCOSE SERPL-MCNC: 96 MG/DL (ref 74–100)
GLUCOSE UR QL STRIP: NORMAL
HAV AB SER QL IA: REACTIVE
HBA1C MFR BLD: 5.7 %
HBV SURFACE AB SER-ACNC: 5.83 MIU/ML
HBV SURFACE AB SERPL IA-ACNC: NONREACTIVE M[IU]/ML
HCT VFR BLD AUTO: 48.7 % (ref 42–52)
HCV AB SERPL QL IA: NONREACTIVE
HDLC SERPL-MCNC: 36 MG/DL (ref 35–60)
HGB BLD-MCNC: 16.6 G/DL (ref 14–18)
HGB UR QL STRIP: NEGATIVE
HOLD SPECIMEN: NORMAL
HYALINE CASTS #/AREA URNS LPF: ABNORMAL /LPF
IMM GRANULOCYTES # BLD AUTO: 0.01 X10(3)/MCL (ref 0–0.04)
IMM GRANULOCYTES NFR BLD AUTO: 0.2 %
KETONES UR QL STRIP: NEGATIVE
LDLC SERPL CALC-MCNC: 133 MG/DL (ref 50–140)
LEUKOCYTE ESTERASE UR QL STRIP: 75
LYMPHOCYTES # BLD AUTO: 2.1 X10(3)/MCL (ref 0.6–4.6)
LYMPHOCYTES NFR BLD AUTO: 49.4 %
MAGNESIUM SERPL-MCNC: 2.1 MG/DL (ref 1.6–2.6)
MCH RBC QN AUTO: 29.6 PG (ref 27–31)
MCHC RBC AUTO-ENTMCNC: 34.1 G/DL (ref 33–36)
MCV RBC AUTO: 86.8 FL (ref 80–94)
MONOCYTES # BLD AUTO: 0.38 X10(3)/MCL (ref 0.1–1.3)
MONOCYTES NFR BLD AUTO: 8.9 %
MUCOUS THREADS URNS QL MICRO: ABNORMAL /LPF
N GONORRHOEA DNA SPEC QL NAA+PROBE: NOT DETECTED
NEUTROPHILS # BLD AUTO: 1.67 X10(3)/MCL (ref 2.1–9.2)
NEUTROPHILS NFR BLD AUTO: 39.4 %
NITRITE UR QL STRIP: NEGATIVE
NRBC BLD AUTO-RTO: 0 %
OHS QRS DURATION: 82 MS
OHS QTC CALCULATION: 400 MS
OHS QTC CALCULATION: 420 MS
OHS QTC CALCULATION: 421 MS
PH UR STRIP: 5.5 [PH]
PLATELET # BLD AUTO: 210 X10(3)/MCL (ref 130–400)
PMV BLD AUTO: 12.8 FL (ref 7.4–10.4)
POTASSIUM SERPL-SCNC: 4.6 MMOL/L (ref 3.5–5.1)
PROT UR QL STRIP: NEGATIVE
RBC # BLD AUTO: 5.61 X10(6)/MCL (ref 4.7–6.1)
RBC #/AREA URNS AUTO: ABNORMAL /HPF
SODIUM SERPL-SCNC: 138 MMOL/L (ref 136–145)
SOURCE (OHS): NORMAL
SP GR UR STRIP.AUTO: 1.02 (ref 1–1.03)
SQUAMOUS #/AREA URNS LPF: ABNORMAL /HPF
T PALLIDUM AB SER QL: NONREACTIVE
TRIGL SERPL-MCNC: 109 MG/DL (ref 34–140)
TROPONIN I SERPL-MCNC: <0.01 NG/ML (ref 0–0.04)
TSH SERPL-ACNC: 0.51 UIU/ML (ref 0.35–4.94)
UROBILINOGEN UR STRIP-ACNC: NORMAL
VLDLC SERPL CALC-MCNC: 22 MG/DL
WBC # BLD AUTO: 4.25 X10(3)/MCL (ref 4.5–11.5)
WBC #/AREA URNS AUTO: ABNORMAL /HPF

## 2024-08-29 PROCEDURE — 93005 ELECTROCARDIOGRAM TRACING: CPT

## 2024-08-29 PROCEDURE — 83036 HEMOGLOBIN GLYCOSYLATED A1C: CPT

## 2024-08-29 PROCEDURE — 84443 ASSAY THYROID STIM HORMONE: CPT

## 2024-08-29 PROCEDURE — 87536 HIV-1 QUANT&REVRSE TRNSCRPJ: CPT

## 2024-08-29 PROCEDURE — 86780 TREPONEMA PALLIDUM: CPT

## 2024-08-29 PROCEDURE — 86706 HEP B SURFACE ANTIBODY: CPT

## 2024-08-29 PROCEDURE — 87086 URINE CULTURE/COLONY COUNT: CPT | Performed by: NURSE PRACTITIONER

## 2024-08-29 PROCEDURE — 85025 COMPLETE CBC W/AUTO DIFF WBC: CPT | Performed by: EMERGENCY MEDICINE

## 2024-08-29 PROCEDURE — 96365 THER/PROPH/DIAG IV INF INIT: CPT

## 2024-08-29 PROCEDURE — 86359 T CELLS TOTAL COUNT: CPT | Performed by: NURSE PRACTITIONER

## 2024-08-29 PROCEDURE — 81001 URINALYSIS AUTO W/SCOPE: CPT | Performed by: NURSE PRACTITIONER

## 2024-08-29 PROCEDURE — 84484 ASSAY OF TROPONIN QUANT: CPT | Performed by: EMERGENCY MEDICINE

## 2024-08-29 PROCEDURE — 25000003 PHARM REV CODE 250: Performed by: EMERGENCY MEDICINE

## 2024-08-29 PROCEDURE — 86803 HEPATITIS C AB TEST: CPT

## 2024-08-29 PROCEDURE — 80048 BASIC METABOLIC PNL TOTAL CA: CPT | Performed by: EMERGENCY MEDICINE

## 2024-08-29 PROCEDURE — 86480 TB TEST CELL IMMUN MEASURE: CPT

## 2024-08-29 PROCEDURE — 99214 OFFICE O/P EST MOD 30 MIN: CPT | Mod: PBBFAC,25 | Performed by: NURSE PRACTITIONER

## 2024-08-29 PROCEDURE — 82306 VITAMIN D 25 HYDROXY: CPT

## 2024-08-29 PROCEDURE — 99284 EMERGENCY DEPT VISIT MOD MDM: CPT | Mod: 25,27

## 2024-08-29 PROCEDURE — 80061 LIPID PANEL: CPT

## 2024-08-29 PROCEDURE — 86708 HEPATITIS A ANTIBODY: CPT

## 2024-08-29 PROCEDURE — 87491 CHLMYD TRACH DNA AMP PROBE: CPT | Performed by: NURSE PRACTITIONER

## 2024-08-29 PROCEDURE — 83880 ASSAY OF NATRIURETIC PEPTIDE: CPT | Performed by: EMERGENCY MEDICINE

## 2024-08-29 PROCEDURE — 63600175 PHARM REV CODE 636 W HCPCS: Performed by: EMERGENCY MEDICINE

## 2024-08-29 PROCEDURE — 83735 ASSAY OF MAGNESIUM: CPT | Performed by: EMERGENCY MEDICINE

## 2024-08-29 RX ORDER — METOPROLOL TARTRATE 25 MG/1
25 TABLET, FILM COATED ORAL 2 TIMES DAILY PRN
Qty: 60 TABLET | Refills: 0 | Status: SHIPPED | OUTPATIENT
Start: 2024-08-29

## 2024-08-29 RX ORDER — MAGNESIUM SULFATE HEPTAHYDRATE 40 MG/ML
2 INJECTION, SOLUTION INTRAVENOUS
Status: COMPLETED | OUTPATIENT
Start: 2024-08-29 | End: 2024-08-29

## 2024-08-29 RX ORDER — BICTEGRAVIR SODIUM, EMTRICITABINE, AND TENOFOVIR ALAFENAMIDE FUMARATE 50; 200; 25 MG/1; MG/1; MG/1
1 TABLET ORAL DAILY
Qty: 90 TABLET | Refills: 1 | Status: SHIPPED | OUTPATIENT
Start: 2024-08-29

## 2024-08-29 RX ADMIN — MAGNESIUM SULFATE HEPTAHYDRATE 2 G: 40 INJECTION, SOLUTION INTRAVENOUS at 12:08

## 2024-08-29 RX ADMIN — SODIUM CHLORIDE 1000 ML: 9 INJECTION, SOLUTION INTRAVENOUS at 12:08

## 2024-08-29 NOTE — ED PROVIDER NOTES
ED PROVIDER NOTE  8/29/2024    CHIEF COMPLAINT:   Chief Complaint   Patient presents with    Abnormal ECG     PT SENT FROM CLINIC W AN ABNORMAL EKG REPORT.  PT ASYMPTOMATIC.  DENIES CP/SOB.  EKG OBTAINED.        HISTORY OF PRESENT ILLNESS:   Rafi Aguillon Jr. is a 44 y.o. male who presents with chief complaint Abnormal ECG.  Patient reports he was at clinic today and they did an ECG which showed he was in atrial fibrillation, this is a new diagnosis for him.  He denies having any sensation of palpations or irregular heartbeat.  States he feels in his normal state of health.  Denies chest pain, shortness of breath, nausea, vomiting.    The history is provided by the patient.         REVIEW OF SYSTEMS: as noted in the HPI.  NURSING NOTES REVIEWED      PAST MEDICAL/SURGICAL HISTORY:   Past Medical History:   Diagnosis Date    Depression     HIV infection     Hx of herpes genitalis       Past Surgical History:   Procedure Laterality Date    HERNIA REPAIR         FAMILY HISTORY:   Family History   Problem Relation Name Age of Onset    Hypertension Mother      Heart disease Father      No Known Problems Daughter      No Known Problems Daughter      No Known Problems Daughter      No Known Problems Daughter         SOCIAL HISTORY:   Social History     Tobacco Use    Smoking status: Never    Smokeless tobacco: Former   Substance Use Topics    Alcohol use: Yes     Comment: occassionally    Drug use: Not Currently       ALLERGIES: Review of patient's allergies indicates:  No Known Allergies    PHYSICAL EXAM:  Initial Vitals [08/29/24 1053]   BP Pulse Resp Temp SpO2   126/85 89 18 97.2 °F (36.2 °C) 99 %      MAP       --         Physical Exam    Nursing note and vitals reviewed.  Constitutional: He appears well-developed and well-nourished. No distress.   HENT:   Head: Normocephalic and atraumatic.   Nose: Nose normal.   Mouth/Throat: Oropharynx is clear and moist and mucous membranes are normal.   Eyes: Conjunctivae and EOM  are normal. Pupils are equal, round, and reactive to light.   Neck: Neck supple. No tracheal deviation present.   Cardiovascular:  Normal rate, normal heart sounds, intact distal pulses and normal pulses. An irregularly irregular rhythm present.           Pulmonary/Chest: Effort normal and breath sounds normal. No respiratory distress.   Abdominal: Abdomen is soft. There is no abdominal tenderness. There is no rebound and no guarding.   Musculoskeletal:         General: Normal range of motion.      Cervical back: Neck supple.     Neurological: He is alert and oriented to person, place, and time. GCS eye subscore is 4. GCS verbal subscore is 5. GCS motor subscore is 6.   CN II-XII intact. Moves all extremities. No gross sensory or motor deficits.   Skin: Skin is warm, dry and intact.   Psychiatric: He has a normal mood and affect. His speech is normal and behavior is normal. Judgment and thought content normal. Cognition and memory are normal.         RESULTS:  Labs Reviewed   BASIC METABOLIC PANEL - Abnormal       Result Value    Sodium 138      Potassium 4.6      Chloride 105      CO2 26      Glucose 96      Blood Urea Nitrogen 13.5      Creatinine 1.35 (*)     BUN/Creatinine Ratio 10      Calcium 9.8      Anion Gap 7.0      eGFR >60     CBC WITH DIFFERENTIAL - Abnormal    WBC 4.25 (*)     RBC 5.61      Hgb 16.6      Hct 48.7      MCV 86.8      MCH 29.6      MCHC 34.1      RDW 13.2      Platelet 210      MPV 12.8 (*)     Neut % 39.4      Lymph % 49.4      Mono % 8.9      Eos % 1.2      Basophil % 0.9      Lymph # 2.10      Neut # 1.67 (*)     Mono # 0.38      Eos # 0.05      Baso # 0.04      IG# 0.01      IG% 0.2      NRBC% 0.0     VITAMIN D - Abnormal    Vitamin D 19 (*)    B-TYPE NATRIURETIC PEPTIDE - Normal    Natriuretic Peptide 11.7     TROPONIN I - Normal    Troponin-I <0.010     MAGNESIUM - Normal    Magnesium Level 2.10     HEPATITIS C ANTIBODY - Normal    Hep C Ab Interp Nonreactive     TSH - Normal    TSH  0.506     SYPHILIS ANTIBODY (WITH REFLEX RPR) - Normal    Syphilis Antibody Nonreactive     CBC W/ AUTO DIFFERENTIAL    Narrative:     The following orders were created for panel order CBC auto differential.  Procedure                               Abnormality         Status                     ---------                               -----------         ------                     CBC with Differential[[id number]]       Abnormal            Final result                 Please view results for these tests on the individual orders.   HEMOGLOBIN A1C    Hemoglobin A1c 5.7      Estimated Average Glucose 116.9     LIPID PANEL    Cholesterol Total 191      HDL Cholesterol 36      Triglyceride 109      Cholesterol/HDL Ratio 5      Very Low Density Lipoprotein 22      LDL Cholesterol 133.00     HEPATITIS B SURFACE ANTIBODY    Hep BsAb Interp Nonreactive      Hep BsAb 5.83     QUANTIFERON GOLD TB   CD4 LYMPHOCYTES (OLG)   HIV-1 RNA, QUANTITATIVE, PCR WITH REFLEX TO GENOTYPE   HEPATITIS A ANTIBODY, IGG   EXTRA TUBES    Narrative:     The following orders were created for panel order EXTRA TUBES.  Procedure                               Abnormality         Status                     ---------                               -----------         ------                     Light Blue Top Hold[[id number]]                             In process                 Gold Top Hold[[id number]]                                   In process                 Gold Top Hold[[id number]]                                   In process                 Pink Top Hold[[id number]]                                   In process                   Please view results for these tests on the individual orders.   LIGHT BLUE TOP HOLD   GOLD TOP HOLD   GOLD TOP HOLD   PINK TOP HOLD     Imaging Results    None         PROCEDURES:  Procedures    ECG:  EKG Readings: (Independently Interpreted)   Initial Reading: No STEMI. Rhythm: Atrial Fibrillation. Heart Rate: 91. Ectopy:  PVCs. Axis: Normal.       ED COURSE AND MEDICAL DECISION MAKING:  Medications   sodium chloride 0.9% bolus 1,000 mL 1,000 mL (0 mLs Intravenous Stopped 8/29/24 1303)   magnesium sulfate 2g in water 50mL IVPB (premix) (0 g Intravenous Stopped 8/29/24 1303)     ED Course as of 08/29/24 1323   u Aug 29, 2024   1228 WBC(!): 4.25 [IB]   1228 Hemoglobin: 16.6 [IB]   1228 Platelet Count: 210 [IB]   1245 Creatinine(!): 1.35  Increased from 1.09 back in February [IB]   1245 Magnesium : 2.10 [IB]   1306 TSH: 0.506 [IB]   1306 BNP: 11.7 [IB]   1306 Troponin I: <0.010 [IB]      ED Course User Index  [IB] Cristobal Harvey, DO        Medical Decision Making  44-year-old male who presents with new onset atrial fibrillation incidentally found on ECG today in clinic sent to the ED for further evaluation.  He denies having any symptoms such as chest pain or shortness of breath or even palpitations.  CBC grossly unremarkable.  BMP shows creatinine of 1.35 otherwise unremarkable.  BNP and troponin negative.  Additional labs were obtained at the request of infection this is disease.  TSH normal.  ECG shows AFib no STEMI.  Discussed with the patient starting him on Xarelto and also discussed providing him with some p.r.n. metoprolol as his rate is already controlled I do not feel that this is a medication that he needs to take regularly and also instructed him on how to check his heart rate with instruction not to take metoprolol if his heart rate is normal between 60 and 100.  Patient acknowledges understanding.  Have also referred to Cardiology.  Given strict ED return precautions. I have spoken with the patient and/or caregivers. I have explained the patient's condition, diagnoses and treatment plan based on the information available to me at this time. I have answered the patient's and/or caregiver's questions and addressed any concerns. The patient and/or caregivers have as good an understanding of the patient's diagnosis, condition  and treatment plan as can be expected at this point. The vital signs have been stable. The patient's condition is stable and appropriate for discharge from the emergency department.     The patient will pursue further outpatient evaluation with the primary care physician or other designated or consulting physician as outlined in the discharge instructions. The patient and/or caregivers are agreeable to this plan of care and follow-up instructions have been explained in detail. The patient and/or caregivers have received these instructions in written format and have expressed an understanding of the discharge instructions. The patient and/or caregivers are aware that any significant change in condition or worsening of symptoms should prompt an immediate return to this or the closest emergency department or a call to 911.    Amount and/or Complexity of Data Reviewed  External Data Reviewed: notes.  Labs: ordered. Decision-making details documented in ED Course.  ECG/medicine tests: ordered and independent interpretation performed.    Risk  Prescription drug management.        CLINICAL IMPRESSION:  1. New onset atrial fibrillation    2. Rapid heart beat    3. HIV disease        DISPOSITION:   ED Disposition Condition    Discharge Stable            ED Prescriptions       Medication Sig Dispense Start Date End Date Auth. Provider    rivaroxaban (XARELTO) 20 mg Tab Take 1 tablet (20 mg total) by mouth daily with dinner or evening meal. 30 tablet 8/29/2024 8/29/2025 Cristobal Harvey,     metoprolol tartrate (LOPRESSOR) 25 MG tablet Take 1 tablet (25 mg total) by mouth 2 (two) times daily as needed (Heart Rate >100). 60 tablet 8/29/2024 -- Cristobal Harvey DO          Follow-up Information       Follow up With Specialties Details Why Contact Info    [facility] Emergency Medicine  If symptoms worsen [address] [facility] [address] [phone]               Cristobal Harvey,  DO  08/29/24 1329

## 2024-08-29 NOTE — PROGRESS NOTES
Patient ID: Rafi Aguillon Jr. 44 y.o.     Chief Complaint:   Chief Complaint   Patient presents with    Followup HIV     Denies problems        HPI:    8/29/24  Rafi is a 44 yo AAM here today for HIV f/u visit.   He is taking Biktarvy daily and is virally suppressed. Labs 2/24 VL UD, creatinine 1.09, 5/23 RPR NR, 9/23 Cd4 1043. He states that he has not taken ergocalciferol in a couple of weeks, forgets the weekly dosing.  He is experiencing some burning with urination, intermittent over the past couple of weeks. Denies any new sexual partners. Denies any illicit drug use. Will collect urine sample today to assess for UTI. Voiced appreciation.  Heart rhythm irregular. No known history of cardiac dysfunction. VSS. He denies any chest pain, pressure, fullness, palpitations.  No diaphoresis, jaw or shoulder pain. EKGs obtained and are abnormal. Will transfer to ED for further evaluation. Patient voiced understanding & appreciation. Report called to Owen in ED.     4/23/24  Rafi is a 44 yo AAM evaluated today via audio-video virtual visit for HIV f/u. He is taking Biktarvy daily and has maintained viral suppression. Labs 2/24 VL UD, CMP within normal limits. He remains monogamous with fiance, post-poned wedding as they just purchased a house in [place]. He is doing well overall but does have some generalized aches and pains and has noted a decrease in energy levels. He is taking Vitamin D2, but not consistently. He will also add collagen powder for natural supplementation as advised. Appreciates refills on all medications. All questions answered & concerns addressed.     1/23/24  Rafi is a 44 yo AAM evaluated today via audio-video virtual visit for HIV f/u. He takes Biktarvy every day & is virally suppressed. Labs 9/23 VL UD, CD4 1043.  He completed Cologuard 10/23, Negative. He is planning to get  5/18/24 in [place], will arrange f/u visit in accordance with his plans. Mood is stable. Sildenafil  effective for ED. Still inconsistent with vitamin D replacement. He declines COVID vaccination. Appreciates flexibility of virtual visits. Will return within 1-2 weeks for labs, orders placed. All questions answered & concerns addressed.           Past Medical History:   Diagnosis Date    Depression     HIV infection     Hx of herpes genitalis         Past Surgical History:   Procedure Laterality Date    HERNIA REPAIR          Social History     Socioeconomic History    Marital status: Single   Tobacco Use    Smoking status: Never    Smokeless tobacco: Former   Substance and Sexual Activity    Alcohol use: Yes     Comment: occassionally    Drug use: Not Currently    Sexual activity: Yes     Partners: Female     Birth control/protection: Condom        Family History   Problem Relation Name Age of Onset    Hypertension Mother      Heart disease Father      No Known Problems Daughter      No Known Problems Daughter      No Known Problems Daughter      No Known Problems Daughter          Review of patient's allergies indicates:  No Known Allergies     Immunization History   Administered Date(s) Administered    HPV 9-Valent 03/22/2019, 11/04/2019, 10/30/2020    Hepatitis A / Hepatitis B 12/01/2008    Influenza 01/21/2014    Influenza - Quadrivalent 10/18/2016, 10/30/2020    Influenza - Quadrivalent - PF (6-35 months) 12/29/2017, 12/03/2018    Influenza - Quadrivalent - PF *Preferred* (6 months and older) 10/09/2009, 10/26/2010, 11/04/2019, 01/19/2023, 09/25/2023    Influenza - Trivalent (ADULT) 10/09/2009, 10/26/2010    Influenza - Trivalent - PF (ADULT) 10/09/2009, 10/26/2010, 09/30/2011, 01/21/2014, 01/08/2015, 03/17/2016    Meningococcal Conjugate (MCV4P) 07/31/2018, 12/03/2018    Pneumococcal 01/21/2014    Pneumococcal Conjugate - 13 Valent 01/08/2015    Pneumococcal Conjugate - 7 Valent 09/17/2008    Pneumococcal Polysaccharide - 23 Valent 01/21/2014, 03/22/2019    Tdap 07/13/2015        Review of Systems  "  Constitutional: Negative.  Negative for diaphoresis.   HENT: Negative.     Eyes: Negative.    Respiratory: Negative.  Negative for cough.    Cardiovascular: Negative.  Negative for chest pain and palpitations.   Gastrointestinal: Negative.    Genitourinary:  Positive for dysuria.   Musculoskeletal: Negative.    Skin: Negative.    Neurological: Negative.    Endo/Heme/Allergies: Negative.    Psychiatric/Behavioral: Negative.     All other systems reviewed and are negative.         Objective:      BP 97/62 (BP Location: Left arm, Patient Position: Sitting, BP Method: Large (Automatic))   Pulse 89   Temp 98.1 °F (36.7 °C) (Oral)   Resp 12   Ht 6' 2" (1.88 m)   Wt 104.7 kg (230 lb 13.2 oz)   BMI 29.64 kg/m²      Physical Exam  Vitals reviewed.   Constitutional:       General: He is not in acute distress.     Appearance: Normal appearance. He is not ill-appearing, toxic-appearing or diaphoretic.   HENT:      Mouth/Throat:      Mouth: Mucous membranes are moist.      Pharynx: Oropharynx is clear.   Eyes:      Conjunctiva/sclera: Conjunctivae normal.   Cardiovascular:      Rate and Rhythm: Tachycardia present. Rhythm irregular.      Pulses: Normal pulses.      Heart sounds: Normal heart sounds.      Comments: HR fluctuating from  bpm.   Pulmonary:      Effort: Pulmonary effort is normal. No respiratory distress.      Breath sounds: Normal breath sounds.   Abdominal:      General: Abdomen is flat. Bowel sounds are normal.      Palpations: Abdomen is soft.   Musculoskeletal:         General: Normal range of motion.      Cervical back: Normal range of motion.   Lymphadenopathy:      Cervical: No cervical adenopathy.   Skin:     General: Skin is warm and dry.   Neurological:      General: No focal deficit present.      Mental Status: He is alert and oriented to person, place, and time. Mental status is at baseline.   Psychiatric:         Mood and Affect: Mood normal.         Behavior: Behavior normal.      "     Labs:   Lab Results   Component Value Date    WBC 4.85 09/25/2023    HGB 15.0 09/25/2023    HCT 45.4 09/25/2023    MCV 88.0 09/25/2023     09/25/2023       CMP  Sodium   Date Value Ref Range Status   02/05/2024 142 136 - 145 mmol/L Final     Potassium   Date Value Ref Range Status   02/05/2024 4.0 3.5 - 5.1 mmol/L Final     Chloride   Date Value Ref Range Status   02/05/2024 107 98 - 107 mmol/L Final     CO2   Date Value Ref Range Status   02/05/2024 27 22 - 29 mmol/L Final     Blood Urea Nitrogen   Date Value Ref Range Status   02/05/2024 17.7 8.9 - 20.6 mg/dL Final     Creatinine   Date Value Ref Range Status   02/05/2024 1.09 0.73 - 1.18 mg/dL Final     Calcium   Date Value Ref Range Status   02/05/2024 9.3 8.4 - 10.2 mg/dL Final     Albumin   Date Value Ref Range Status   02/05/2024 4.0 3.5 - 5.0 g/dL Final     Bilirubin Total   Date Value Ref Range Status   02/05/2024 0.4 <=1.5 mg/dL Final     ALP   Date Value Ref Range Status   02/05/2024 58 40 - 150 unit/L Final     AST   Date Value Ref Range Status   02/05/2024 25 5 - 34 unit/L Final     ALT   Date Value Ref Range Status   02/05/2024 32 0 - 55 unit/L Final     eGFR   Date Value Ref Range Status   02/05/2024 >60 mls/min/1.73/m2 Final     Lab Results   Component Value Date    TSH 0.847 01/19/2023     Hep C Ab Interp   Date Value Ref Range Status   01/19/2023 Nonreactive Nonreactive Final     Syphilis Antibody   Date Value Ref Range Status   05/22/2023 Nonreactive Nonreactive, Equivocal Final     Cholesterol Total   Date Value Ref Range Status   01/19/2023 186 <=200 mg/dL Final     HDL Cholesterol   Date Value Ref Range Status   01/19/2023 33 (L) 35 - 60 mg/dL Final     Triglyceride   Date Value Ref Range Status   01/19/2023 127 34 - 140 mg/dL Final     Cholesterol/HDL Ratio   Date Value Ref Range Status   01/19/2023 6 (H) 0 - 5 Final     Very Low Density Lipoprotein   Date Value Ref Range Status   01/19/2023 25  Final     LDL Cholesterol   Date  Value Ref Range Status   01/19/2023 128.00 50.00 - 140.00 mg/dL Final     Vitamin D   Date Value Ref Range Status   01/19/2023 14.5 (L) 30.0 - 80.0 ng/mL Final     Results for orders placed or performed in visit on 09/25/23   CD4 Lymphocytes   Result Value Ref Range    Patient Age >18     WBC Absolute 4,850 4,500 - 11,500 /mm3    Lymph Percent 43.7 28 - 48 %    Lymph Absolute 2,119.45 1,260 - 5,520 x10(3)/mcL    CD4 % 49.22 %    CD4 Absolute 1,043 589 - 1,505 unit/L    T Cell Interp       Normal absolute lymphocyte count with normal absolute CD4+ lymphocyte count.    Arnel Riddle M.D.     Narrative    This test was developed and its performance characteristics determined by [facility]. It has not been cleared or approved by the US Food and Drug Administration. The FDA does not require this test to go through premarket FDA review. This test is used for clinical purposes. It should not be regarded as investigational or for research. This laboratory is certified under the Clinical Laboratory Improvement Amendments (CLIA) as qualified to perform high complexity clinical laboratory testing.     Results for orders placed or performed in visit on 02/05/24   HIV-1 RNA, Quantitative, PCR with Reflex to Genotype   Result Value Ref Range    HIV-1 RNA Detect/Quant, P Undetected Undetected copies/mL     Results for orders placed or performed in visit on 01/19/23   Quantiferon Gold TB   Result Value Ref Range    QuantiFERON-Tb Gold Plus Result Negative Negative    TB1 Ag minus Nil Result 0.00 IU/mL    TB2 Ag minus Nil Result 0.00 IU/mL    Mitogen minus Nil Result >10.00 IU/mL    Nil Result 0.01 IU/mL     No results found for this or any previous visit.  Results for orders placed or performed in visit on 01/19/23   Urinalysis   Result Value Ref Range    Color, UA Yellow Yellow, Light-Yellow, Dark Yellow, [place], Straw    Appearance, UA Clear Clear    Specific Gravity, UA 1.025     pH, UA 5.5 5.0  - 8.5    Protein, UA Negative Negative mg/dL    Glucose, UA Normal Negative, Normal mg/dL    Ketones, UA Negative Negative mg/dL    Blood, UA Negative Negative unit/L    Bilirubin, UA Negative Negative mg/dL    Urobilinogen, UA Normal 0.2, 1.0, Normal mg/dL    Nitrites, UA Negative Negative    Leukocyte Esterase, UA Negative Negative unit/L    WBC, UA 0-5 None Seen, 0-2, 3-5, 0-5 /HPF    Bacteria, UA None Seen None Seen /HPF    Squamous Epithelial Cells, UA None Seen None Seen /HPF    Mucous, UA Trace (A) None Seen /LPF    Hyaline Casts, UA None Seen None Seen /lpf    RBC, UA 0-5 None Seen, 0-2, 3-5, 0-5 /HPF       Imaging: Reviewed most recent relevant imaging studies available, notable results highlighted in this note    Medications:     Current Outpatient Medications   Medication Instructions    acyclovir (ZOVIRAX) 400 mg, Oral, 2 times daily PRN    nwaemswqx-irqfecqa-cgcfkps ala (BIKTARVY) -25 mg (25 kg or greater) 1 tablet, Oral, Daily    ergocalciferol (ERGOCALCIFEROL) 50,000 Units, Oral, Every 7 days    ketoconazole (NIZORAL) 2 % shampoo Topical (Top), Twice weekly    sildenafil (REVATIO) 60 mg, Oral, As needed (PRN)       Assessment:       Problem List Items Addressed This Visit    None  Visit Diagnoses       HIV disease    -  Primary    Relevant Medications    kpappmrua-opbifinl-fxujbhk ala (BIKTARVY) -25 mg (25 kg or greater)    Other Relevant Orders    Quantiferon Gold TB    Hemoglobin A1C    Hepatitis C Antibody    Vitamin D    TSH    Lipid Panel    SYPHILIS ANTIBODY (WITH REFLEX RPR)    Chlamydia/GC, PCR    Comprehensive Metabolic Panel    CBC Auto Differential    CD4 Lymphocytes    HIV-1 RNA, Quantitative, PCR with Reflex to Genotype    Hepatitis A antibody, IgG    Hepatitis B Surface Ab, Qualitative    Urinalysis, Reflex to Urine Culture    Need for vaccination        Irregular heart rhythm        Relevant Orders    IN OFFICE EKG 12-LEAD (to Meacham)    IN OFFICE EKG 12-LEAD (to Muse)    Drug  Screen, Urine               Plan:      HIV disease  -     ixtnbrxpp-uypoxkgp-xwaqsxs ala (BIKTARVY) -25 mg (25 kg or greater); Take 1 tablet by mouth once daily.  Dispense: 90 tablet; Refill: 1  -     Quantiferon Gold TB; Future; Expected date: 08/29/2024  -     Hemoglobin A1C; Future; Expected date: 08/29/2024  -     Hepatitis C Antibody; Future; Expected date: 08/29/2024  -     Vitamin D; Future; Expected date: 08/29/2024  -     TSH; Future; Expected date: 08/29/2024  -     Lipid Panel; Future; Expected date: 08/29/2024  -     SYPHILIS ANTIBODY (WITH REFLEX RPR); Future; Expected date: 08/29/2024  -     Chlamydia/GC, PCR  -     Comprehensive Metabolic Panel; Future; Expected date: 08/29/2024  -     CBC Auto Differential; Future; Expected date: 08/29/2024  -     CD4 Lymphocytes; Future; Expected date: 08/29/2024  -     HIV-1 RNA, Quantitative, PCR with Reflex to Genotype; Future; Expected date: 08/29/2024  -     Hepatitis A antibody, IgG; Future; Expected date: 08/29/2024  -     Hepatitis B Surface Ab, Qualitative; Future; Expected date: 08/29/2024  -     Urinalysis, Reflex to Urine Culture  Adherence and sexual health counseling done.  Use condoms for all sexual encounters.  Blood precautions.   Continue Biktarvy as prescribed.  Labs same day as next visit.  RTC 3 months with Cindy, in clinic.      HIV Wellness:  Anal pap: 7/21 NIL  Oral CT/GC: 11/19 Neg  Anal CT/GC: 11/19 Neg  Urine CT/GC: 1/23 Neg, 5/23 Neg, 8/24  RPR:  1/23 NR, 5/23 NR, 8/24  Ophth: 1/21    Need for vaccination  -     Discontinue: pneumoc 20-jaya conj-dip cr(PF) (PREVNAR-20 (PF)) injection Syrg 0.5 mL  Will defer to next visit due to cardiac concerns today.     Irregular heart rhythm/New Onset Afib  Abnormal EKG  -     IN OFFICE EKG 12-LEAD (to Muse)  -     IN OFFICE EKG 12-LEAD (to Muse)  -     Drug Screen, Urine; Future; Expected date: 08/29/2024  Report called to Owen in ED.   Patient transferred to ED via wheelchair by LPN.   VSS &  patient is not in any acute distress at this time.

## 2024-08-30 LAB
CD3 CELLS # BLD: 1365 CELLS/MCL (ref 550–2202)
CD3 CELLS NFR BLD: 73 % (ref 58–86)
CD3+CD4+ CELLS # BLD: 867 CELLS/MCL (ref 365–1437)
CD3+CD4+ CELLS NFR BLD: 47 % (ref 32–64)
CD3+CD4+ CELLS/CD3+CD8+ CLL BLD: 1.8 %
CD3+CD8+ CELLS # BLD: 480 CELLS/MCL (ref 145–846)
CD3+CD8+ CELLS NFR BLD: 26 % (ref 13–40)
CD45 CELLS # BLD: 1.86 THOU/MCL (ref 0.82–2.84)
HIV1 RNA # PLAS NAA DL=20: NORMAL COPIES/ML

## 2024-08-31 LAB — BACTERIA UR CULT: NO GROWTH

## 2024-09-04 DIAGNOSIS — B20 HIV DISEASE: Primary | ICD-10-CM

## 2024-09-04 LAB
GAMMA INTERFERON BACKGROUND BLD IA-ACNC: NORMAL [IU]/ML
M TB IFN-G BLD-IMP: NORMAL
M TB IFN-G CD4+ BCKGRND COR BLD-ACNC: NORMAL [IU]/ML
M TB IFN-G CD4+CD8+ BCKGRND COR BLD-ACNC: NORMAL [IU]/ML
MITOGEN IGNF BCKGRD COR BLD-ACNC: NORMAL [IU]/ML

## 2024-09-04 NOTE — PROGRESS NOTES
Reviewing labs for Cindy MEEKS as she is out.  Results of QuantiFERON gold were unable to be performed.  Results state TNP.  Please call patient and ask for them to come in within the next 2 weeks to have the labs repeated.  Thank you

## 2024-09-05 ENCOUNTER — HOSPITAL ENCOUNTER (OUTPATIENT)
Dept: CARDIOLOGY | Facility: HOSPITAL | Age: 44
Discharge: HOME | End: 2024-09-05
Attending: INTERNAL MEDICINE
Payer: COMMERCIAL

## 2024-09-05 ENCOUNTER — OFFICE VISIT (OUTPATIENT)
Dept: CARDIOLOGY | Facility: CLINIC | Age: 44
End: 2024-09-05
Payer: COMMERCIAL

## 2024-09-05 VITALS
SYSTOLIC BLOOD PRESSURE: 98 MMHG | DIASTOLIC BLOOD PRESSURE: 70 MMHG | OXYGEN SATURATION: 97 % | WEIGHT: 229.06 LBS | HEART RATE: 62 BPM | BODY MASS INDEX: 29.41 KG/M2

## 2024-09-05 DIAGNOSIS — B20 HIV INFECTION, UNSPECIFIED SYMPTOM STATUS: ICD-10-CM

## 2024-09-05 DIAGNOSIS — I48.91 NEW ONSET ATRIAL FIBRILLATION: ICD-10-CM

## 2024-09-05 DIAGNOSIS — G47.00 INSOMNIA, UNSPECIFIED TYPE: ICD-10-CM

## 2024-09-05 DIAGNOSIS — R07.9 CHEST PAIN SYNDROME: ICD-10-CM

## 2024-09-05 DIAGNOSIS — I48.91 NEW ONSET ATRIAL FIBRILLATION: Primary | ICD-10-CM

## 2024-09-05 DIAGNOSIS — B00.9 HSV (HERPES SIMPLEX VIRUS) INFECTION: ICD-10-CM

## 2024-09-05 DIAGNOSIS — N52.9 ERECTILE DYSFUNCTION, UNSPECIFIED ERECTILE DYSFUNCTION TYPE: ICD-10-CM

## 2024-09-05 DIAGNOSIS — R06.83 SNORING: ICD-10-CM

## 2024-09-05 DIAGNOSIS — F41.8 DEPRESSION WITH ANXIETY: ICD-10-CM

## 2024-09-05 LAB
OHS QRS DURATION: 82 MS
OHS QTC CALCULATION: 403 MS

## 2024-09-05 PROCEDURE — 3078F DIAST BP <80 MM HG: CPT | Mod: CPTII,S$GLB,, | Performed by: INTERNAL MEDICINE

## 2024-09-05 PROCEDURE — 3008F BODY MASS INDEX DOCD: CPT | Mod: CPTII,S$GLB,, | Performed by: INTERNAL MEDICINE

## 2024-09-05 PROCEDURE — 3044F HG A1C LEVEL LT 7.0%: CPT | Mod: CPTII,S$GLB,, | Performed by: INTERNAL MEDICINE

## 2024-09-05 PROCEDURE — 93010 ELECTROCARDIOGRAM REPORT: CPT | Mod: ,,, | Performed by: INTERNAL MEDICINE

## 2024-09-05 PROCEDURE — 3074F SYST BP LT 130 MM HG: CPT | Mod: CPTII,S$GLB,, | Performed by: INTERNAL MEDICINE

## 2024-09-05 PROCEDURE — 1159F MED LIST DOCD IN RCRD: CPT | Mod: CPTII,S$GLB,, | Performed by: INTERNAL MEDICINE

## 2024-09-05 PROCEDURE — 99999 PR PBB SHADOW E&M-EST. PATIENT-LVL III: CPT | Mod: PBBFAC,,, | Performed by: INTERNAL MEDICINE

## 2024-09-05 PROCEDURE — 93005 ELECTROCARDIOGRAM TRACING: CPT

## 2024-09-05 PROCEDURE — 99205 OFFICE O/P NEW HI 60 MIN: CPT | Mod: S$GLB,,, | Performed by: INTERNAL MEDICINE

## 2024-09-05 NOTE — PROGRESS NOTES
Subjective:   Patient ID:  Rafi Aguillon Jr. is a 44 y.o. male who presents for evaluation of No chief complaint on file.      HPI  A 45 yo male with hiv was found to be in afib on routine office visit. He went to er and was discharged home on b blockers that he did not take . He is on xarelto,. He has chest pain that is a burn that is intermittent no real triggers it is on and off waxes and wanes. It lasts few hours. He has shortness of breath that is worse with exertion. He is compliant with hiv meds. He has no chf symptoms.he snores at nite   He drinks energy drinks and coke. He does not smoke no etoh intake.   Past Medical History:   Diagnosis Date    Depression     HIV infection     Hx of herpes genitalis     Snoring 09/05/2024       Past Surgical History:   Procedure Laterality Date    HERNIA REPAIR         Social History     Tobacco Use    Smoking status: Never    Smokeless tobacco: Former   Substance Use Topics    Alcohol use: Yes     Comment: occassionally    Drug use: Not Currently       Family History   Problem Relation Name Age of Onset    Hypertension Mother      Heart disease Father      No Known Problems Daughter      No Known Problems Daughter      No Known Problems Daughter      No Known Problems Daughter         Current Outpatient Medications   Medication Sig    qzkwlgyzc-hkjrvmsk-wzjbtmv ala (BIKTARVY) -25 mg (25 kg or greater) Take 1 tablet by mouth once daily.    ergocalciferol (ERGOCALCIFEROL) 50,000 unit Cap Take 1 capsule (50,000 Units total) by mouth every 7 days.    ketoconazole (NIZORAL) 2 % shampoo Apply topically twice a week.    metoprolol tartrate (LOPRESSOR) 25 MG tablet Take 1 tablet (25 mg total) by mouth 2 (two) times daily as needed (Heart Rate >100).    rivaroxaban (XARELTO) 20 mg Tab Take 1 tablet (20 mg total) by mouth daily with dinner or evening meal.    sildenafil (REVATIO) 20 mg Tab Take 3 tablets (60 mg total) by mouth as needed (ED).    acyclovir (ZOVIRAX) 400 MG  tablet Take 1 tablet (400 mg total) by mouth 2 (two) times daily as needed (outbreaks).     No current facility-administered medications for this visit.     Current Outpatient Medications on File Prior to Visit   Medication Sig    iqxlenchf-osaikxfq-xwqriri ala (BIKTARVY) -25 mg (25 kg or greater) Take 1 tablet by mouth once daily.    ergocalciferol (ERGOCALCIFEROL) 50,000 unit Cap Take 1 capsule (50,000 Units total) by mouth every 7 days.    ketoconazole (NIZORAL) 2 % shampoo Apply topically twice a week.    metoprolol tartrate (LOPRESSOR) 25 MG tablet Take 1 tablet (25 mg total) by mouth 2 (two) times daily as needed (Heart Rate >100).    rivaroxaban (XARELTO) 20 mg Tab Take 1 tablet (20 mg total) by mouth daily with dinner or evening meal.    sildenafil (REVATIO) 20 mg Tab Take 3 tablets (60 mg total) by mouth as needed (ED).    acyclovir (ZOVIRAX) 400 MG tablet Take 1 tablet (400 mg total) by mouth 2 (two) times daily as needed (outbreaks).     No current facility-administered medications on file prior to visit.       Review of patient's allergies indicates:  No Known Allergies    Review of Systems   Constitutional: Negative for malaise/fatigue.   Eyes:  Negative for blurred vision.   Cardiovascular:  Positive for chest pain. Negative for claudication, cyanosis, dyspnea on exertion, irregular heartbeat, leg swelling, near-syncope, orthopnea, palpitations and paroxysmal nocturnal dyspnea.   Respiratory:  Negative for cough, hemoptysis and shortness of breath.    Hematologic/Lymphatic: Negative for bleeding problem. Does not bruise/bleed easily.   Skin:  Negative for dry skin and itching.   Musculoskeletal:  Negative for falls, muscle weakness and myalgias.   Gastrointestinal:  Negative for abdominal pain, diarrhea, heartburn, hematemesis, hematochezia and melena.   Genitourinary:  Negative for flank pain and hematuria.   Neurological:  Negative for dizziness, focal weakness, headaches, light-headedness,  numbness, paresthesias, seizures and weakness.   Psychiatric/Behavioral:  Negative for altered mental status and memory loss. The patient is not nervous/anxious.    Allergic/Immunologic: Negative for hives.       Objective:   Physical Exam  Vitals and nursing note reviewed.   Constitutional:       General: He is not in acute distress.     Appearance: He is well-developed. He is not diaphoretic.   HENT:      Head: Normocephalic and atraumatic.   Eyes:      General:         Right eye: No discharge.         Left eye: No discharge.      Pupils: Pupils are equal, round, and reactive to light.   Neck:      Thyroid: No thyromegaly.      Vascular: No JVD.   Cardiovascular:      Rate and Rhythm: Normal rate and regular rhythm.      Pulses: Normal pulses and intact distal pulses.      Heart sounds: Normal heart sounds. No murmur heard.     No friction rub. No gallop.   Pulmonary:      Effort: Pulmonary effort is normal. No respiratory distress.      Breath sounds: Normal breath sounds. No wheezing or rales.   Chest:      Chest wall: No tenderness.   Abdominal:      General: Bowel sounds are normal. There is no distension.      Palpations: Abdomen is soft.      Tenderness: There is no abdominal tenderness.   Musculoskeletal:         General: Normal range of motion.      Cervical back: Neck supple.      Right lower leg: No edema.      Left lower leg: No edema.   Skin:     General: Skin is warm and dry.      Findings: No erythema or rash.   Neurological:      General: No focal deficit present.      Mental Status: He is alert and oriented to person, place, and time.      Cranial Nerves: No cranial nerve deficit.   Psychiatric:         Mood and Affect: Mood normal.         Behavior: Behavior normal.       Vitals:    09/05/24 1624 09/05/24 1628   BP: 98/70 98/70   BP Location: Left arm Right arm   Patient Position: Sitting Sitting   Pulse: 62    SpO2: 97%    Weight: 103.9 kg (229 lb 0.9 oz)      Lab Results   Component Value Date  "   CHOL 191 08/29/2024    CHOL 186 01/19/2023    CHOL 172 11/03/2021     Body mass index is 29.41 kg/m².   Lab Results   Component Value Date    HGBA1C 5.7 08/29/2024      BMP  Lab Results   Component Value Date     08/29/2024    K 4.6 08/29/2024     08/29/2024    CO2 26 08/29/2024    BUN 13.5 08/29/2024    CREATININE 1.35 (H) 08/29/2024    CALCIUM 9.8 08/29/2024    EGFRNORACEVR >60 08/29/2024      Lab Results   Component Value Date    HDL 36 08/29/2024    HDL 33 (L) 01/19/2023    HDL 33 (L) 11/03/2021     No results found for: "LDLCALC"  Lab Results   Component Value Date    TRIG 109 08/29/2024    TRIG 127 01/19/2023    TRIG 120 11/03/2021     No results found for: "CHOLHDL"    Chemistry        Component Value Date/Time     08/29/2024 1214    K 4.6 08/29/2024 1214     08/29/2024 1214    CO2 26 08/29/2024 1214    BUN 13.5 08/29/2024 1214    CREATININE 1.35 (H) 08/29/2024 1214        Component Value Date/Time    CALCIUM 9.8 08/29/2024 1214    ALKPHOS 58 02/05/2024 1029    AST 25 02/05/2024 1029    ALT 32 02/05/2024 1029    BILITOT 0.4 02/05/2024 1029    EGFRNONAA 68 (L) 11/03/2021 0906          Lab Results   Component Value Date    TSH 0.506 08/29/2024     No results found for: "INR", "PROTIME"  Lab Results   Component Value Date    WBC 4.25 (L) 08/29/2024    HGB 16.6 08/29/2024    HCT 48.7 08/29/2024    MCV 86.8 08/29/2024     08/29/2024     BNP  @LABRCNTIP(BNP,BNPTRIAGEBLO)@  CrCl cannot be calculated (Patient's most recent lab result is older than the maximum 7 days allowed.).  Assessment:     1. New onset atrial fibrillation    2. Insomnia, unspecified type    3. HSV (herpes simplex virus) infection    4. HIV infection, unspecified symptom status    5. Erectile dysfunction, unspecified erectile dysfunction type    6. Depression with anxiety    7. Snoring    8. Chest pain syndrome      Has paf ? Onset and duration. Has stigmata for sleep apnea and has intake of caffeinated products " and energy drinks he converted to sinus rhythm will continue xarelto for now and obtain echo and holter in addition to sleep study.  As far as chest pain is concerned it is atypical w/o ischemic changes will get ett done.   Duer to bradycardia will hold off on any b blockers. He also was counseled about caffeine cessation and  energy drinks discontinuation.    Plan:     Holter   Echo   Ett   Sleep evaluation  F/u in 1 months.

## 2024-09-06 ENCOUNTER — TELEPHONE (OUTPATIENT)
Dept: PULMONOLOGY | Facility: CLINIC | Age: 44
End: 2024-09-06
Payer: COMMERCIAL

## 2024-09-10 ENCOUNTER — TELEPHONE (OUTPATIENT)
Dept: INFECTIOUS DISEASES | Facility: CLINIC | Age: 44
End: 2024-09-10
Payer: COMMERCIAL

## 2024-09-10 NOTE — TELEPHONE ENCOUNTER
Phoned patient. Informed of Quant Gold testing not performed. Suggested repeating the testing when in the area. States will be around Friday and will have testing done. Voiced understanding and appreciative of call.

## 2024-09-10 NOTE — TELEPHONE ENCOUNTER
----- Message from DANIELLE Mosqueda sent at 9/4/2024  2:37 PM CDT -----  Reviewing labs for Cindy MEEKS as she is out.  Results of QuantiFERON gold were unable to be performed.  Results state TNP.  Please call patient and ask for them to come in within the next 2 weeks to have the labs repeated.  Thank you

## 2024-09-13 ENCOUNTER — HOSPITAL ENCOUNTER (OUTPATIENT)
Dept: CARDIOLOGY | Facility: HOSPITAL | Age: 44
Discharge: HOME | End: 2024-09-13
Attending: INTERNAL MEDICINE
Payer: COMMERCIAL

## 2024-09-13 VITALS
DIASTOLIC BLOOD PRESSURE: 70 MMHG | SYSTOLIC BLOOD PRESSURE: 98 MMHG | HEIGHT: 74 IN | BODY MASS INDEX: 29.39 KG/M2 | WEIGHT: 229 LBS

## 2024-09-13 VITALS
BODY MASS INDEX: 29.39 KG/M2 | DIASTOLIC BLOOD PRESSURE: 82 MMHG | HEIGHT: 74 IN | SYSTOLIC BLOOD PRESSURE: 112 MMHG | WEIGHT: 229 LBS | HEART RATE: 70 BPM

## 2024-09-13 DIAGNOSIS — R07.9 CHEST PAIN SYNDROME: ICD-10-CM

## 2024-09-13 DIAGNOSIS — I48.91 NEW ONSET ATRIAL FIBRILLATION: ICD-10-CM

## 2024-09-13 LAB
AORTIC ROOT ANNULUS: 2.88 CM
ASCENDING AORTA: 3.31 CM
AV INDEX (PROSTH): 0.69
AV MEAN GRADIENT: 3 MMHG
AV PEAK GRADIENT: 6 MMHG
AV VALVE AREA BY VELOCITY RATIO: 2.78 CM²
AV VALVE AREA: 2.69 CM²
AV VELOCITY RATIO: 0.72
BSA FOR ECHO PROCEDURE: 2.33 M2
CV ECHO LV RWT: 0.43 CM
DOP CALC AO PEAK VEL: 1.21 M/S
DOP CALC AO VTI: 26.2 CM
DOP CALC LVOT AREA: 3.9 CM2
DOP CALC LVOT DIAMETER: 2.22 CM
DOP CALC LVOT PEAK VEL: 0.87 M/S
DOP CALC LVOT STROKE VOLUME: 70.41 CM3
DOP CALC RVOT PEAK VEL: 0.62 M/S
DOP CALC RVOT VTI: 15.8 CM
DOP CALCLVOT PEAK VEL VTI: 18.2 CM
E WAVE DECELERATION TIME: 204.47 MSEC
E/A RATIO: 0.99
E/E' RATIO: 6.96 M/S
ECHO LV POSTERIOR WALL: 0.95 CM (ref 0.6–1.1)
EJECTION FRACTION: 60 %
FRACTIONAL SHORTENING: 32 % (ref 28–44)
INTERVENTRICULAR SEPTUM: 1.16 CM (ref 0.6–1.1)
IVC DIAMETER: 1.98 CM
IVRT: 98.95 MSEC
LA MAJOR: 5.92 CM
LA MINOR: 5.78 CM
LA WIDTH: 4 CM
LEFT ATRIUM AREA SYSTOLIC (APICAL 2 CHAMBER): 20.72 CM2
LEFT ATRIUM AREA SYSTOLIC (APICAL 4 CHAMBER): 20.52 CM2
LEFT ATRIUM SIZE: 3.62 CM
LEFT ATRIUM VOLUME INDEX MOD: 25.3 ML/M2
LEFT ATRIUM VOLUME INDEX: 31.3 ML/M2
LEFT ATRIUM VOLUME MOD: 58.18 CM3
LEFT ATRIUM VOLUME: 71.99 CM3
LEFT INTERNAL DIMENSION IN SYSTOLE: 2.97 CM (ref 2.1–4)
LEFT VENTRICLE DIASTOLIC VOLUME INDEX: 37.44 ML/M2
LEFT VENTRICLE DIASTOLIC VOLUME: 86.11 ML
LEFT VENTRICLE END SYSTOLIC VOLUME APICAL 2 CHAMBER: 59.86 ML
LEFT VENTRICLE END SYSTOLIC VOLUME APICAL 4 CHAMBER: 53.44 ML
LEFT VENTRICLE MASS INDEX: 69 G/M2
LEFT VENTRICLE SYSTOLIC VOLUME INDEX: 14.9 ML/M2
LEFT VENTRICLE SYSTOLIC VOLUME: 34.24 ML
LEFT VENTRICULAR INTERNAL DIMENSION IN DIASTOLE: 4.37 CM (ref 3.5–6)
LEFT VENTRICULAR MASS: 157.55 G
LV LATERAL E/E' RATIO: 6.67 M/S
LV SEPTAL E/E' RATIO: 7.27 M/S
LVED V (TEICH): 86.11 ML
LVES V (TEICH): 34.24 ML
LVOT MG: 1.68 MMHG
LVOT MV: 0.61 CM/S
MV PEAK A VEL: 0.81 M/S
MV PEAK E VEL: 0.8 M/S
MV STENOSIS PRESSURE HALF TIME: 59.3 MS
MV VALVE AREA P 1/2 METHOD: 3.71 CM2
OHS CV RV/LV RATIO: 0.9 CM
PV MEAN GRADIENT: 1 MMHG
PV MV: 0.75 M/S
PV PEAK GRADIENT: 5 MMHG
PV PEAK VELOCITY: 1.07 M/S
RA MAJOR: 4.92 CM
RA WIDTH: 4.29 CM
RIGHT VENTRICULAR END-DIASTOLIC DIMENSION: 3.93 CM
SINUS: 3.52 CM
STJ: 3.58 CM
TDI LATERAL: 0.12 M/S
TDI SEPTAL: 0.11 M/S
TDI: 0.12 M/S
TRICUSPID ANNULAR PLANE SYSTOLIC EXCURSION: 2.01 CM
Z-SCORE OF LEFT VENTRICULAR DIMENSION IN END DIASTOLE: -7.13
Z-SCORE OF LEFT VENTRICULAR DIMENSION IN END SYSTOLE: -4.69

## 2024-09-13 PROCEDURE — 93226 XTRNL ECG REC<48 HR SCAN A/R: CPT

## 2024-09-13 PROCEDURE — 93018 CV STRESS TEST I&R ONLY: CPT | Mod: ,,, | Performed by: STUDENT IN AN ORGANIZED HEALTH CARE EDUCATION/TRAINING PROGRAM

## 2024-09-13 PROCEDURE — 93306 TTE W/DOPPLER COMPLETE: CPT | Mod: 26,,, | Performed by: STUDENT IN AN ORGANIZED HEALTH CARE EDUCATION/TRAINING PROGRAM

## 2024-09-13 PROCEDURE — 93225 XTRNL ECG REC<48 HRS REC: CPT

## 2024-09-13 PROCEDURE — 93306 TTE W/DOPPLER COMPLETE: CPT

## 2024-09-13 PROCEDURE — 93016 CV STRESS TEST SUPVJ ONLY: CPT | Mod: ,,, | Performed by: STUDENT IN AN ORGANIZED HEALTH CARE EDUCATION/TRAINING PROGRAM

## 2024-09-13 PROCEDURE — 93017 CV STRESS TEST TRACING ONLY: CPT

## 2024-09-13 PROCEDURE — 93227 XTRNL ECG REC<48 HR R&I: CPT | Mod: ,,, | Performed by: INTERNAL MEDICINE

## 2024-09-14 LAB
CV STRESS BASE HR: 63 BPM
DIASTOLIC BLOOD PRESSURE: 82 MMHG
OHS CV CPX 1 MINUTE RECOVERY HEART RATE: 130 BPM
OHS CV CPX 85 PERCENT MAX PREDICTED HEART RATE MALE: 148
OHS CV CPX ESTIMATED METS: 11
OHS CV CPX MAX PREDICTED HEART RATE: 174
OHS CV CPX PATIENT IS FEMALE: 0
OHS CV CPX PATIENT IS MALE: 1
OHS CV CPX PEAK DIASTOLIC BLOOD PRESSURE: 105 MMHG
OHS CV CPX PEAK HEAR RATE: 150 BPM
OHS CV CPX PEAK RATE PRESSURE PRODUCT: NORMAL
OHS CV CPX PEAK SYSTOLIC BLOOD PRESSURE: 189 MMHG
OHS CV CPX PERCENT MAX PREDICTED HEART RATE ACHIEVED: 86
OHS CV CPX RATE PRESSURE PRODUCT PRESENTING: 7056
STRESS ECHO POST EXERCISE DUR MIN: 9 MINUTES
STRESS ECHO POST EXERCISE DUR SEC: 9 SECONDS
SYSTOLIC BLOOD PRESSURE: 112 MMHG

## 2024-09-16 ENCOUNTER — TELEPHONE (OUTPATIENT)
Dept: CARDIOLOGY | Facility: CLINIC | Age: 44
End: 2024-09-16
Payer: COMMERCIAL

## 2024-09-16 NOTE — TELEPHONE ENCOUNTER
Patient came in today for his paperwork and I advised of test results and we are waiting on his holter results.         ----- Message from Ingrid Galvan MD sent at 9/16/2024  7:52 AM CDT -----  Stress test looks good

## 2024-09-17 LAB
OHS CV EVENT MONITOR DAY: 0
OHS CV HOLTER LENGTH DECIMAL HOURS: 33.78
OHS CV HOLTER LENGTH HOURS: 33
OHS CV HOLTER LENGTH MINUTES: 47
OHS CV HOLTER SINUS AVERAGE HR: 78
OHS CV HOLTER SINUS MAX HR: 129
OHS CV HOLTER SINUS MIN HR: 42

## 2024-09-27 DIAGNOSIS — N52.9 ERECTILE DYSFUNCTION, UNSPECIFIED ERECTILE DYSFUNCTION TYPE: ICD-10-CM

## 2024-09-30 RX ORDER — SILDENAFIL CITRATE 20 MG/1
60 TABLET ORAL
Qty: 30 TABLET | Refills: 4 | Status: SHIPPED | OUTPATIENT
Start: 2024-09-30

## 2024-10-29 DIAGNOSIS — L21.9 SEBORRHEA: ICD-10-CM

## 2024-10-29 DIAGNOSIS — B20 HIV DISEASE: ICD-10-CM

## 2024-10-29 DIAGNOSIS — E55.9 VITAMIN D DEFICIENCY: ICD-10-CM

## 2024-10-29 RX ORDER — KETOCONAZOLE 20 MG/ML
SHAMPOO, SUSPENSION TOPICAL
Qty: 120 ML | Refills: 1 | Status: SHIPPED | OUTPATIENT
Start: 2024-10-31

## 2024-10-29 RX ORDER — BICTEGRAVIR SODIUM, EMTRICITABINE, AND TENOFOVIR ALAFENAMIDE FUMARATE 50; 200; 25 MG/1; MG/1; MG/1
1 TABLET ORAL DAILY
Qty: 90 TABLET | Refills: 1 | Status: CANCELLED | OUTPATIENT
Start: 2024-10-29

## 2024-10-29 RX ORDER — ERGOCALCIFEROL 1.25 MG/1
50000 CAPSULE ORAL
Qty: 12 CAPSULE | Refills: 1 | Status: CANCELLED | OUTPATIENT
Start: 2024-10-29

## 2025-01-09 ENCOUNTER — TELEPHONE (OUTPATIENT)
Dept: INFECTIOUS DISEASES | Facility: CLINIC | Age: 45
End: 2025-01-09
Payer: COMMERCIAL

## 2025-01-09 DIAGNOSIS — K04.7 DENTAL INFECTION: Primary | ICD-10-CM

## 2025-01-09 RX ORDER — AMOXICILLIN AND CLAVULANATE POTASSIUM 875; 125 MG/1; MG/1
1 TABLET, FILM COATED ORAL EVERY 12 HOURS
Qty: 14 TABLET | Refills: 0 | Status: SHIPPED | OUTPATIENT
Start: 2025-01-09 | End: 2025-01-16

## 2025-01-10 NOTE — TELEPHONE ENCOUNTER
----- Message from [place] sent at 1/9/2025  1:05 PM CST -----  Patient of Tiffanie      Patient has a virtual visit for next week with provider    Patient has a tooth ache and would like to be seen sooner or prescribed antibiotic    [phone]     Please advise    [phone]  
Last visit with Cindy Moreno, APRN: 8/29/2024  Last visit in [facility] INFECTIOUS DISEASE: 8/29/2024    Patient's next visit in [facility] INFECTIOUS DISEASE: 1/14/2025     LL 08/29/2025    Phoned patient. No answer. Message left on voice mail to contact the clinic. Please advise.     Patient called clinic back. Currently in [place] near [place]. Requests antibiotic to pharmacy listed in chart. [facility] [place].   
Prescription sent. Please have him f/u with dentist asap for evaluation and further treatment. Thank you.   
Rx sent to pharmacy electronically per provider, Cindy Moreno NP. Phoned patient. Notified of rx sent to pharmacy. Voiced understanding and appreciates call.  
1.  Take Tylenol 650mgs every 4-6 hrs and/or Ibuprofen 600mgs every 6 hrs as needed for pain  2.  Follow up with your PMD in 2-3 days  3.  Return to the ER for worsening headache, blurry vision, weakness, numbness/tingling, persistent nausea/vomiting or any other concerning symptoms

## 2025-01-20 DIAGNOSIS — E55.9 VITAMIN D DEFICIENCY: ICD-10-CM

## 2025-01-21 ENCOUNTER — OFFICE VISIT (OUTPATIENT)
Dept: INFECTIOUS DISEASES | Facility: CLINIC | Age: 45
End: 2025-01-21
Payer: COMMERCIAL

## 2025-01-21 ENCOUNTER — PATIENT MESSAGE (OUTPATIENT)
Dept: INFECTIOUS DISEASES | Facility: CLINIC | Age: 45
End: 2025-01-21

## 2025-01-21 DIAGNOSIS — B00.9 HSV (HERPES SIMPLEX VIRUS) INFECTION: ICD-10-CM

## 2025-01-21 DIAGNOSIS — L21.9 SEBORRHEA: ICD-10-CM

## 2025-01-21 DIAGNOSIS — B20 HIV DISEASE: ICD-10-CM

## 2025-01-21 RX ORDER — ACYCLOVIR 400 MG/1
400 TABLET ORAL 2 TIMES DAILY PRN
Qty: 20 TABLET | Refills: 0 | Status: SHIPPED | OUTPATIENT
Start: 2025-01-21 | End: 2025-01-31

## 2025-01-21 RX ORDER — KETOCONAZOLE 20 MG/ML
SHAMPOO, SUSPENSION TOPICAL
Qty: 120 ML | Refills: 1 | Status: SHIPPED | OUTPATIENT
Start: 2025-01-23

## 2025-01-21 RX ORDER — BICTEGRAVIR SODIUM, EMTRICITABINE, AND TENOFOVIR ALAFENAMIDE FUMARATE 50; 200; 25 MG/1; MG/1; MG/1
1 TABLET ORAL DAILY
Qty: 30 TABLET | Refills: 4 | Status: SHIPPED | OUTPATIENT
Start: 2025-01-21

## 2025-01-21 RX ORDER — ERGOCALCIFEROL 1.25 MG/1
50000 CAPSULE ORAL
Qty: 12 CAPSULE | Refills: 1 | Status: SHIPPED | OUTPATIENT
Start: 2025-01-21

## 2025-01-21 NOTE — PROGRESS NOTES
Patient ID: Rafi Aguillon Jr. 44 y.o.   Patient and provider are located in the [facility].    Face to Face time with patient:  30 minutes of total time spent on the encounter, which includes face to face time and non-face to face time preparing to see the patient (eg, review of tests), Obtaining and/or reviewing separately obtained history, Documenting clinical information in the electronic or other health record, Independently interpreting results (not separately reported) and communicating results to the patient/family/caregiver, or Care coordination (not separately reported).     Each patient to whom he or she provides medical services by telemedicine is:  (1) informed of the relationship between the physician and patient and the respective role of any other health care provider with respect to management of the patient; and (2) notified that he or she may decline to receive medical services by telemedicine and may withdraw from such care at any time.  Chief Complaint: No chief complaint on file.       HPI:    1/21/25  Rafi is a 43 yo AAM evaluated today via audio-video virtual visit for f/u. He is taking Biktarvy daily and tolerates it well. Labs 8/24 VL UD, Cd4 1043.  He has been evaluated by cardiology and afib was determined to be caused by too much caffeine. He is drinking lots of water, but has been taking high doses of ibuprofen for dental pain improved but not resolved with antibiotic treatment.  Will make appointment with a dentist as recommended. Requests refills for ketoconazole & acyclovir. Will come in next week for labs. Will plan for PCV 20 at next office visit. All questions answered & concerns addressed.    8/29/24  Rafi is a 44 yo AAM here today for HIV f/u visit.   He is taking Biktarvy daily and is virally suppressed. Labs 2/24 VL UD, creatinine 1.09, 5/23 RPR NR, 9/23 Cd4 1043. He states that he has not taken ergocalciferol in a couple of weeks, forgets the weekly dosing.  He is  experiencing some burning with urination, intermittent over the past couple of weeks. Denies any new sexual partners. Denies any illicit drug use. Will collect urine sample today to assess for UTI. Voiced appreciation.  Heart rhythm irregular. No known history of cardiac dysfunction. VSS. He denies any chest pain, pressure, fullness, palpitations.  No diaphoresis, jaw or shoulder pain. EKGs obtained and are abnormal. Will transfer to ED for further evaluation. Patient voiced understanding & appreciation. Report called to Owen in ED.      4/23/24  Rafi is a 44 yo AAM evaluated today via audio-video virtual visit for HIV f/u. He is taking Biktarvy daily and has maintained viral suppression. Labs 2/24 VL UD, CMP within normal limits. He remains monogamous with fiance, post-poned wedding as they just purchased a house in [place]. He is doing well overall but does have some generalized aches and pains and has noted a decrease in energy levels. He is taking Vitamin D2, but not consistently. He will also add collagen powder for natural supplementation as advised. Appreciates refills on all medications. All questions answered & concerns addressed.           Past Medical History:   Diagnosis Date    Depression     HIV infection     Hx of herpes genitalis     Snoring 09/05/2024        Past Surgical History:   Procedure Laterality Date    HERNIA REPAIR          Social History     Socioeconomic History    Marital status: Single   Tobacco Use    Smoking status: Never    Smokeless tobacco: Former   Substance and Sexual Activity    Alcohol use: Yes     Comment: occassionally    Drug use: Not Currently    Sexual activity: Yes     Partners: Female     Birth control/protection: Condom     Social Drivers of Health     Financial Resource Strain: Medium Risk (9/4/2024)    Overall Financial Resource Strain (CARDIA)     Difficulty of Paying Living Expenses: Somewhat hard   Food Insecurity: Food Insecurity Present (9/4/2024)    Hunger  Vital Sign     Worried About Running Out of Food in the Last Year: Sometimes true     Ran Out of Food in the Last Year: Sometimes true   Physical Activity: Insufficiently Active (9/4/2024)    Exercise Vital Sign     Days of Exercise per Week: 2 days     Minutes of Exercise per Session: 30 min   Stress: Stress Concern Present (9/4/2024)    [employer] - Occupational Stress Questionnaire     Feeling of Stress : To some extent   Housing Stability: High Risk (9/4/2024)    Housing Stability Vital Sign     Unable to Pay for Housing in the Last Year: Yes        Family History   Problem Relation Name Age of Onset    Hypertension Mother      Heart disease Father      No Known Problems Daughter      No Known Problems Daughter      No Known Problems Daughter      No Known Problems Daughter          Review of patient's allergies indicates:  No Known Allergies     Immunization History   Administered Date(s) Administered    HPV 9-Valent 03/22/2019, 11/04/2019, 10/30/2020    Hepatitis A / Hepatitis B 12/01/2008    Influenza 01/21/2014    Influenza - Quadrivalent 10/18/2016, 10/30/2020    Influenza - Quadrivalent - PF (6-35 months) 12/29/2017, 12/03/2018    Influenza - Quadrivalent - PF *Preferred* (6 months and older) 10/09/2009, 10/26/2010, 11/04/2019, 01/19/2023, 09/25/2023    Influenza - Trivalent - Afluria, Fluzone MDV 10/09/2009, 10/26/2010    Influenza - Trivalent - Fluarix, Flulaval, Fluzone, Afluria - PF 10/09/2009, 10/26/2010, 09/30/2011, 01/21/2014, 01/08/2015, 03/17/2016    Meningococcal Conjugate (MCV4P) 07/31/2018, 12/03/2018    Pneumococcal 01/21/2014    Pneumococcal Conjugate - 13 Valent 01/08/2015    Pneumococcal Conjugate - 7 Valent 09/17/2008    Pneumococcal Polysaccharide - 23 Valent 01/21/2014, 03/22/2019    Tdap 07/13/2015        Review of Systems   Constitutional: Negative.    HENT: Negative.     Eyes: Negative.    Respiratory: Negative.     Cardiovascular: Negative.     Gastrointestinal: Negative.    Genitourinary: Negative.    Musculoskeletal: Negative.    Skin: Negative.    Neurological: Negative.    Endo/Heme/Allergies: Negative.    Psychiatric/Behavioral: Negative.     All other systems reviewed and are negative.         Objective:      There were no vitals taken for this visit.     Physical Exam  Constitutional:       General: He is not in acute distress.     Appearance: Normal appearance.   HENT:      Head: Normocephalic.   Eyes:      Conjunctiva/sclera: Conjunctivae normal.   Pulmonary:      Effort: Pulmonary effort is normal.   Musculoskeletal:         General: Normal range of motion.      Cervical back: Normal range of motion.   Neurological:      General: No focal deficit present.      Mental Status: He is alert and oriented to person, place, and time. Mental status is at baseline.   Psychiatric:         Mood and Affect: Mood normal.         Behavior: Behavior normal.         Thought Content: Thought content normal.         Judgment: Judgment normal.          Labs:   Lab Results   Component Value Date    WBC 4.25 (L) 08/29/2024    HGB 16.6 08/29/2024    HCT 48.7 08/29/2024    MCV 86.8 08/29/2024     08/29/2024       CMP  Sodium   Date Value Ref Range Status   08/29/2024 138 136 - 145 mmol/L Final     Potassium   Date Value Ref Range Status   08/29/2024 4.6 3.5 - 5.1 mmol/L Final     Chloride   Date Value Ref Range Status   08/29/2024 105 98 - 107 mmol/L Final     CO2   Date Value Ref Range Status   08/29/2024 26 22 - 29 mmol/L Final     Blood Urea Nitrogen   Date Value Ref Range Status   08/29/2024 13.5 8.9 - 20.6 mg/dL Final     Creatinine   Date Value Ref Range Status   08/29/2024 1.35 (H) 0.73 - 1.18 mg/dL Final     Calcium   Date Value Ref Range Status   08/29/2024 9.8 8.4 - 10.2 mg/dL Final     Albumin   Date Value Ref Range Status   02/05/2024 4.0 3.5 - 5.0 g/dL Final     Bilirubin Total   Date Value Ref Range Status   02/05/2024 0.4 <=1.5 mg/dL Final      ALP   Date Value Ref Range Status   02/05/2024 58 40 - 150 unit/L Final     AST   Date Value Ref Range Status   02/05/2024 25 5 - 34 unit/L Final     ALT   Date Value Ref Range Status   02/05/2024 32 0 - 55 unit/L Final     eGFR   Date Value Ref Range Status   08/29/2024 >60 mL/min/1.73/m2 Final     Lab Results   Component Value Date    TSH 0.506 08/29/2024     Hep C Ab Interp   Date Value Ref Range Status   08/29/2024 Nonreactive Nonreactive Final     Syphilis Antibody   Date Value Ref Range Status   08/29/2024 Nonreactive Nonreactive, Equivocal Final     Cholesterol Total   Date Value Ref Range Status   08/29/2024 191 <=200 mg/dL Final     HDL Cholesterol   Date Value Ref Range Status   08/29/2024 36 35 - 60 mg/dL Final     Triglyceride   Date Value Ref Range Status   08/29/2024 109 34 - 140 mg/dL Final     Cholesterol/HDL Ratio   Date Value Ref Range Status   08/29/2024 5 0 - 5 Final     Very Low Density Lipoprotein   Date Value Ref Range Status   08/29/2024 22  Final     LDL Cholesterol   Date Value Ref Range Status   08/29/2024 133.00 50.00 - 140.00 mg/dL Final     Vitamin D   Date Value Ref Range Status   08/29/2024 19 (L) 30 - 80 ng/mL Final     Results for orders placed or performed in visit on 09/25/23   CD4 Lymphocytes   Result Value Ref Range    Patient Age >18     WBC Absolute 4,850 4,500 - 11,500 /mm3    Lymph Percent 43.7 28 - 48 %    Lymph Absolute 2,119.45 1,260 - 5,520 x10(3)/mcL    CD4 % 49.22 %    CD4 Absolute 1,043 589 - 1,505 unit/L    T Cell Interp       Normal absolute lymphocyte count with normal absolute CD4+ lymphocyte count.    Arnel Riddle M.D.     Narrative    This test was developed and its performance characteristics determined by [facility]. It has not been cleared or approved by the US Food and Drug Administration. The FDA does not require this test to go through premarket FDA review. This test is used for clinical purposes. It should not be  regarded as investigational or for research. This laboratory is certified under the Clinical Laboratory Improvement Amendments (CLIA) as qualified to perform high complexity clinical laboratory testing.     Results for orders placed or performed during the hospital encounter of 08/29/24   HIV-1 RNA, Quantitative, PCR with Reflex to Genotype   Result Value Ref Range    HIV-1 RNA Detect/Quant, P Undetected Undetected copies/mL     Results for orders placed or performed during the hospital encounter of 08/29/24   Quantiferon Gold TB   Result Value Ref Range    QuantiFERON-Tb Gold Plus Result TNP     TB1 Ag minus Nil Result TNP     TB2 Ag minus Nil Result TNP     Mitogen minus Nil Result TNP     Nil Result TNP      No results found for this or any previous visit.  Results for orders placed or performed in visit on 01/19/23   Urinalysis   Result Value Ref Range    Color, UA Yellow Yellow, Light-Yellow, Dark Yellow, [place], Straw    Appearance, UA Clear Clear    Specific Gravity, UA 1.025     pH, UA 5.5 5.0 - 8.5    Protein, UA Negative Negative mg/dL    Glucose, UA Normal Negative, Normal mg/dL    Ketones, UA Negative Negative mg/dL    Blood, UA Negative Negative unit/L    Bilirubin, UA Negative Negative mg/dL    Urobilinogen, UA Normal 0.2, 1.0, Normal mg/dL    Nitrites, UA Negative Negative    Leukocyte Esterase, UA Negative Negative unit/L    WBC, UA 0-5 None Seen, 0-2, 3-5, 0-5 /HPF    Bacteria, UA None Seen None Seen /HPF    Squamous Epithelial Cells, UA None Seen None Seen /HPF    Mucous, UA Trace (A) None Seen /LPF    Hyaline Casts, UA None Seen None Seen /lpf    RBC, UA 0-5 None Seen, 0-2, 3-5, 0-5 /HPF       Imaging: Reviewed most recent relevant imaging studies available, notable results highlighted in this note    Medications:     Current Outpatient Medications   Medication Instructions    acyclovir (ZOVIRAX) 400 mg, Oral, 2 times daily PRN    tqblwxjdo-prqdcoer-ubekqsv ala (BIKTARVY) -25 mg (25 kg or  greater) 1 tablet, Oral, Daily    ergocalciferol (ERGOCALCIFEROL) 50,000 Units, Oral, Every 7 days    [START ON 1/23/2025] ketoconazole (NIZORAL) 2 % shampoo Topical (Top), Twice weekly    metoprolol tartrate (LOPRESSOR) 25 mg, Oral, 2 times daily PRN    sildenafil (REVATIO) 60 mg, Oral, As needed (PRN)       Assessment:       Problem List Items Addressed This Visit       HSV (herpes simplex virus) infection    Relevant Medications    acyclovir (ZOVIRAX) 400 MG tablet     Other Visit Diagnoses       HIV disease        Relevant Medications    zshnqevhx-deuivqkb-sbxqwaq ala (BIKTARVY) -25 mg (25 kg or greater)    Other Relevant Orders    Comprehensive Metabolic Panel    CBC Auto Differential    CD4 Lymphocytes    HIV-1 RNA, Quantitative, PCR with Reflex to Genotype    Seborrhea        Relevant Medications    ketoconazole (NIZORAL) 2 % shampoo (Start on 1/23/2025)               Plan:      HIV disease  -     udyadvtnl-ptvkgzdy-cqkwksb ala (BIKTARVY) -25 mg (25 kg or greater); Take 1 tablet by mouth once daily.  Dispense: 30 tablet; Refill: 4  -     Comprehensive Metabolic Panel; Future; Expected date: 01/21/2025  -     CBC Auto Differential; Future; Expected date: 01/21/2025  -     CD4 Lymphocytes; Future; Expected date: 01/21/2025  -     HIV-1 RNA, Quantitative, PCR with Reflex to Genotype; Future; Expected date: 01/21/2025  Adherence and sexual health counseling done.  Use condoms for all sexual encounters.  Blood precautions.   Continue Biktarvy as prescribed.  Labs next week as discussed.  RTC 4 months with Cindy, in clinic.   PCV 20 next visit.      HIV Wellness:  Anal pap: 7/21 NIL  Oral CT/GC: 11/19 Neg  Anal CT/GC: 11/19 Neg  Urine CT/GC: 1/23 Neg, 5/23 Neg, 8/24  RPR:  1/23 NR, 5/23 NR, 8/24  Ophth: 1/21    HSV (herpes simplex virus) infection  -     acyclovir (ZOVIRAX) 400 MG tablet; Take 1 tablet (400 mg total) by mouth 2 (two) times daily as needed (outbreaks).  Dispense: 20 tablet; Refill:  0  Acyclovir PRN outbreaks.    Seborrhea  -     ketoconazole (NIZORAL) 2 % shampoo; Apply topically twice a week.  Dispense: 120 mL; Refill: 1  Ketoconazole shampoo PRN.

## 2025-02-05 ENCOUNTER — LAB VISIT (OUTPATIENT)
Dept: LAB | Facility: HOSPITAL | Age: 45
End: 2025-02-05
Attending: NURSE PRACTITIONER
Payer: COMMERCIAL

## 2025-02-05 DIAGNOSIS — B20 HIV DISEASE: ICD-10-CM

## 2025-02-05 LAB
ALBUMIN SERPL-MCNC: 4.1 G/DL (ref 3.5–5)
ALBUMIN/GLOB SERPL: 1.1 RATIO (ref 1.1–2)
ALP SERPL-CCNC: 63 UNIT/L (ref 40–150)
ALT SERPL-CCNC: 21 UNIT/L (ref 0–55)
ANION GAP SERPL CALC-SCNC: 6 MEQ/L
AST SERPL-CCNC: 18 UNIT/L (ref 5–34)
BASOPHILS # BLD AUTO: 0.04 X10(3)/MCL
BASOPHILS NFR BLD AUTO: 0.8 %
BILIRUB SERPL-MCNC: 0.4 MG/DL
BUN SERPL-MCNC: 18.5 MG/DL (ref 8.9–20.6)
CALCIUM SERPL-MCNC: 9.4 MG/DL (ref 8.4–10.2)
CHLORIDE SERPL-SCNC: 105 MMOL/L (ref 98–107)
CO2 SERPL-SCNC: 28 MMOL/L (ref 22–29)
CREAT SERPL-MCNC: 1.1 MG/DL (ref 0.72–1.25)
CREAT/UREA NIT SERPL: 17
EOSINOPHIL # BLD AUTO: 0.16 X10(3)/MCL (ref 0–0.9)
EOSINOPHIL NFR BLD AUTO: 3.2 %
ERYTHROCYTE [DISTWIDTH] IN BLOOD BY AUTOMATED COUNT: 13.2 % (ref 11.5–17)
GFR SERPLBLD CREATININE-BSD FMLA CKD-EPI: >60 ML/MIN/1.73/M2
GLOBULIN SER-MCNC: 3.8 GM/DL (ref 2.4–3.5)
GLUCOSE SERPL-MCNC: 92 MG/DL (ref 74–100)
HCT VFR BLD AUTO: 46.7 % (ref 42–52)
HGB BLD-MCNC: 15.5 G/DL (ref 14–18)
IMM GRANULOCYTES # BLD AUTO: 0.01 X10(3)/MCL (ref 0–0.04)
IMM GRANULOCYTES NFR BLD AUTO: 0.2 %
LYMPHOCYTES # BLD AUTO: 2.41 X10(3)/MCL (ref 0.6–4.6)
LYMPHOCYTES NFR BLD AUTO: 48.9 %
MCH RBC QN AUTO: 29.6 PG (ref 27–31)
MCHC RBC AUTO-ENTMCNC: 33.2 G/DL (ref 33–36)
MCV RBC AUTO: 89.1 FL (ref 80–94)
MONOCYTES # BLD AUTO: 0.36 X10(3)/MCL (ref 0.1–1.3)
MONOCYTES NFR BLD AUTO: 7.3 %
NEUTROPHILS # BLD AUTO: 1.95 X10(3)/MCL (ref 2.1–9.2)
NEUTROPHILS NFR BLD AUTO: 39.6 %
NRBC BLD AUTO-RTO: 0 %
PLATELET # BLD AUTO: 187 X10(3)/MCL (ref 130–400)
PMV BLD AUTO: 11.9 FL (ref 7.4–10.4)
POTASSIUM SERPL-SCNC: 3.8 MMOL/L (ref 3.5–5.1)
PROT SERPL-MCNC: 7.9 GM/DL (ref 6.4–8.3)
RBC # BLD AUTO: 5.24 X10(6)/MCL (ref 4.7–6.1)
SODIUM SERPL-SCNC: 139 MMOL/L (ref 136–145)
WBC # BLD AUTO: 4.93 X10(3)/MCL (ref 4.5–11.5)

## 2025-02-05 PROCEDURE — 86361 T CELL ABSOLUTE COUNT: CPT

## 2025-02-05 PROCEDURE — 36415 COLL VENOUS BLD VENIPUNCTURE: CPT

## 2025-02-05 PROCEDURE — 85025 COMPLETE CBC W/AUTO DIFF WBC: CPT

## 2025-02-05 PROCEDURE — 87536 HIV-1 QUANT&REVRSE TRNSCRPJ: CPT

## 2025-02-05 PROCEDURE — 80053 COMPREHEN METABOLIC PANEL: CPT

## 2025-02-06 LAB — HIV1 RNA # PLAS NAA DL=20: NORMAL COPIES/ML

## 2025-02-07 LAB
AGE: 46
CD3+CD4+ CELLS # SPEC: 776 UNIT/L (ref 589–1505)
CD3+CD4+ CELLS NFR BLD: 32.2 %
LYMPHOCYTES # BLD AUTO: 2410.77 X10(3)/MCL (ref 1260–5520)
LYMPHOCYTES NFR LN MANUAL: 48.9 % (ref 28–48)
LYMPHOMA - T-CELL MARKERS SPEC-IMP: ABNORMAL
WBC # BLD AUTO: 4930 /MM3 (ref 4500–11500)

## 2025-06-03 ENCOUNTER — OFFICE VISIT (OUTPATIENT)
Dept: INFECTIOUS DISEASES | Facility: CLINIC | Age: 45
End: 2025-06-03
Payer: COMMERCIAL

## 2025-06-03 ENCOUNTER — LAB VISIT (OUTPATIENT)
Dept: LAB | Facility: HOSPITAL | Age: 45
End: 2025-06-03
Attending: NURSE PRACTITIONER
Payer: COMMERCIAL

## 2025-06-03 VITALS
SYSTOLIC BLOOD PRESSURE: 115 MMHG | TEMPERATURE: 98 F | WEIGHT: 228 LBS | DIASTOLIC BLOOD PRESSURE: 74 MMHG | BODY MASS INDEX: 29.26 KG/M2 | HEART RATE: 64 BPM | HEIGHT: 74 IN

## 2025-06-03 DIAGNOSIS — I49.9 IRREGULAR HEART RHYTHM: ICD-10-CM

## 2025-06-03 DIAGNOSIS — B20 HIV DISEASE: Primary | ICD-10-CM

## 2025-06-03 DIAGNOSIS — B20 HIV DISEASE: ICD-10-CM

## 2025-06-03 DIAGNOSIS — Z23 NEED FOR VACCINATION: ICD-10-CM

## 2025-06-03 LAB
ACCEPTIBLE SP GR UR QL: 1.03 (ref 1–1.03)
ALBUMIN SERPL-MCNC: 4.1 G/DL (ref 3.5–5)
ALBUMIN/GLOB SERPL: 1.1 RATIO (ref 1.1–2)
ALP SERPL-CCNC: 56 UNIT/L (ref 40–150)
ALT SERPL-CCNC: 20 UNIT/L (ref 0–55)
AMPHET UR QL SCN: NEGATIVE
ANION GAP SERPL CALC-SCNC: 8 MEQ/L
AST SERPL-CCNC: 20 UNIT/L (ref 11–45)
BARBITURATE SCN PRESENT UR: NEGATIVE
BENZODIAZ UR QL SCN: NEGATIVE
BILIRUB SERPL-MCNC: 0.5 MG/DL
BUN SERPL-MCNC: 16.7 MG/DL (ref 8.9–20.6)
CALCIUM SERPL-MCNC: 9.5 MG/DL (ref 8.4–10.2)
CANNABINOIDS UR QL SCN: NEGATIVE
CHLORIDE SERPL-SCNC: 108 MMOL/L (ref 98–107)
CO2 SERPL-SCNC: 25 MMOL/L (ref 22–29)
COCAINE UR QL SCN: NEGATIVE
CREAT SERPL-MCNC: 1.15 MG/DL (ref 0.72–1.25)
CREAT/UREA NIT SERPL: 15
FENTANYL UR QL SCN: NEGATIVE
GFR SERPLBLD CREATININE-BSD FMLA CKD-EPI: >60 ML/MIN/1.73/M2
GLOBULIN SER-MCNC: 3.6 GM/DL (ref 2.4–3.5)
GLUCOSE SERPL-MCNC: 64 MG/DL (ref 74–100)
MDMA UR QL SCN: NEGATIVE
OPIATES UR QL SCN: NEGATIVE
PCP UR QL: NEGATIVE
PH UR: 6 [PH] (ref 3–11)
POTASSIUM SERPL-SCNC: 4 MMOL/L (ref 3.5–5.1)
PROT SERPL-MCNC: 7.7 GM/DL (ref 6.4–8.3)
SODIUM SERPL-SCNC: 141 MMOL/L (ref 136–145)

## 2025-06-03 PROCEDURE — 36415 COLL VENOUS BLD VENIPUNCTURE: CPT

## 2025-06-03 PROCEDURE — 3078F DIAST BP <80 MM HG: CPT | Mod: CPTII,,, | Performed by: NURSE PRACTITIONER

## 2025-06-03 PROCEDURE — 3074F SYST BP LT 130 MM HG: CPT | Mod: CPTII,,, | Performed by: NURSE PRACTITIONER

## 2025-06-03 PROCEDURE — 90677 PCV20 VACCINE IM: CPT | Mod: PBBFAC

## 2025-06-03 PROCEDURE — 99214 OFFICE O/P EST MOD 30 MIN: CPT | Mod: S$PBB,,, | Performed by: NURSE PRACTITIONER

## 2025-06-03 PROCEDURE — 80053 COMPREHEN METABOLIC PANEL: CPT

## 2025-06-03 PROCEDURE — 90471 IMMUNIZATION ADMIN: CPT | Mod: PBBFAC

## 2025-06-03 PROCEDURE — 87536 HIV-1 QUANT&REVRSE TRNSCRPJ: CPT

## 2025-06-03 PROCEDURE — 1160F RVW MEDS BY RX/DR IN RCRD: CPT | Mod: CPTII,,, | Performed by: NURSE PRACTITIONER

## 2025-06-03 PROCEDURE — 1159F MED LIST DOCD IN RCRD: CPT | Mod: CPTII,,, | Performed by: NURSE PRACTITIONER

## 2025-06-03 PROCEDURE — 3008F BODY MASS INDEX DOCD: CPT | Mod: CPTII,,, | Performed by: NURSE PRACTITIONER

## 2025-06-03 PROCEDURE — 99213 OFFICE O/P EST LOW 20 MIN: CPT | Mod: PBBFAC | Performed by: NURSE PRACTITIONER

## 2025-06-03 PROCEDURE — 80307 DRUG TEST PRSMV CHEM ANLYZR: CPT

## 2025-06-03 RX ORDER — BICTEGRAVIR SODIUM, EMTRICITABINE, AND TENOFOVIR ALAFENAMIDE FUMARATE 50; 200; 25 MG/1; MG/1; MG/1
1 TABLET ORAL DAILY
Qty: 30 TABLET | Refills: 4 | Status: SHIPPED | OUTPATIENT
Start: 2025-06-03

## 2025-06-03 RX ADMIN — PNEUMOCOCCAL 20-VALENT CONJUGATE VACCINE 0.5 ML
2.2; 2.2; 2.2; 2.2; 2.2; 2.2; 2.2; 2.2; 2.2; 2.2; 2.2; 2.2; 2.2; 2.2; 2.2; 2.2; 4.4; 2.2; 2.2; 2.2 INJECTION, SUSPENSION INTRAMUSCULAR at 10:06

## 2025-06-05 LAB — HIV1 RNA # PLAS NAA DL=20: 39 COPIES/ML

## 2025-06-13 DIAGNOSIS — E55.9 VITAMIN D DEFICIENCY: ICD-10-CM

## 2025-06-13 RX ORDER — ERGOCALCIFEROL 1.25 MG/1
50000 CAPSULE ORAL
Qty: 12 CAPSULE | Refills: 1 | Status: SHIPPED | OUTPATIENT
Start: 2025-06-13

## 2025-06-23 DIAGNOSIS — N52.9 ERECTILE DYSFUNCTION, UNSPECIFIED ERECTILE DYSFUNCTION TYPE: ICD-10-CM

## 2025-06-24 RX ORDER — SILDENAFIL CITRATE 20 MG/1
TABLET ORAL
Qty: 30 TABLET | Refills: 4 | Status: SHIPPED | OUTPATIENT
Start: 2025-06-24

## 2025-08-11 ENCOUNTER — PATIENT MESSAGE (OUTPATIENT)
Dept: INFECTIOUS DISEASES | Facility: CLINIC | Age: 45
End: 2025-08-11
Payer: COMMERCIAL

## 2025-08-26 DIAGNOSIS — L21.9 SEBORRHEA: ICD-10-CM

## 2025-08-27 RX ORDER — KETOCONAZOLE 20 MG/ML
SHAMPOO, SUSPENSION TOPICAL
Qty: 120 ML | Refills: 0 | Status: SHIPPED | OUTPATIENT
Start: 2025-08-27